# Patient Record
Sex: FEMALE | Race: WHITE | NOT HISPANIC OR LATINO | Employment: PART TIME | URBAN - METROPOLITAN AREA
[De-identification: names, ages, dates, MRNs, and addresses within clinical notes are randomized per-mention and may not be internally consistent; named-entity substitution may affect disease eponyms.]

---

## 2017-01-09 ENCOUNTER — OFFICE VISIT (OUTPATIENT)
Dept: OBSTETRICS AND GYNECOLOGY | Age: 32
End: 2017-01-09

## 2017-01-09 VITALS
HEIGHT: 66 IN | BODY MASS INDEX: 25.39 KG/M2 | WEIGHT: 158 LBS | DIASTOLIC BLOOD PRESSURE: 84 MMHG | SYSTOLIC BLOOD PRESSURE: 122 MMHG

## 2017-01-09 DIAGNOSIS — Z11.3 SCREENING FOR STD (SEXUALLY TRANSMITTED DISEASE): ICD-10-CM

## 2017-01-09 DIAGNOSIS — Z30.41 ENCOUNTER FOR SURVEILLANCE OF CONTRACEPTIVE PILLS: ICD-10-CM

## 2017-01-09 DIAGNOSIS — Z01.419 ENCOUNTER FOR GYNECOLOGICAL EXAMINATION: Primary | ICD-10-CM

## 2017-01-09 DIAGNOSIS — Z11.51 SCREENING FOR HPV (HUMAN PAPILLOMAVIRUS): ICD-10-CM

## 2017-01-09 PROCEDURE — 99395 PREV VISIT EST AGE 18-39: CPT | Performed by: OBSTETRICS & GYNECOLOGY

## 2017-01-09 RX ORDER — NORETHINDRONE ACETATE AND ETHINYL ESTRADIOL AND FERROUS FUMARATE 1MG-20(24)
1 KIT ORAL DAILY
Qty: 28 TABLET | Refills: 12 | Status: SHIPPED | OUTPATIENT
Start: 2017-01-09 | End: 2018-01-15

## 2017-01-09 NOTE — PROGRESS NOTES
"Subjective     Chief Complaint   Patient presents with   • Annual Exam     no problems       History of Present Illness    Carey Ring is a 31 y.o.  who presents for annual exam.  Her menses are regular every 28-30 days, lasting 4-7 days, dysmenorrhea none   Regular menses with ocp's, plans to come off early fall for pregnancy, engaged, getting  in May  Obstetric History:  OB History      Para Term  AB TAB SAB Ectopic Multiple Living    0 0 0 0 0 0 0 0 0 0         Menstrual History:     Patient's last menstrual period was 2017.         Current contraception: OCP (estrogen/progesterone)  History of abnormal Pap smear: yes - ascus, hpv negative  Received Gardasil immunization: yes - all 3  Perform regular self breast exam: no  Family history of uterine or ovarian cancer: no  Family History of colon cancer: yes - dad at 56yo  Family history of breast cancer: yes - mgm-postmenopausal    Mammogram: not indicated.  Colonoscopy: not indicated.  DEXA: not indicated.    Exercise: moderately active      The following portions of the patient's history were reviewed and updated as appropriate: allergies, current medications, past family history, past medical history, past social history, past surgical history and problem list.    Review of Systems    A comprehensive review of systems was negative.     Objective   Physical Exam    Visit Vitals   • /84   • Ht 66\" (167.6 cm)   • Wt 158 lb (71.7 kg)   • LMP 2017   • Breastfeeding No   • BMI 25.5 kg/m2       General:   alert, appears stated age, cooperative and no distress   Neck: no asymmetry, masses, or scars and thyroid normal to palpation   Heart: regular rate and rhythm, S1, S2 normal, no murmur, click, rub or gallop   Lungs: clear to auscultation bilaterally   Abdomen: soft, non-tender, without masses or organomegaly   Breast: inspection negative, no nipple discharge or bleeding, no masses or nodularity palpable   Vulva: normal, " Bartholin's, Urethra, Grainfield's normal   Vagina: normal mucosa, normal discharge   Cervix: no lesions   Uterus: normal size, mobile, non-tender   Adnexa: normal adnexa and no mass, fullness, tenderness   Rectal: not indicated     Assessment/Plan   Carey was seen today for annual exam.    Diagnoses and all orders for this visit:    Encounter for gynecological examination  -     IGP,CtNg,AptimaHPV,rfx16 / 18,45    Screening for HPV (human papillomavirus)  -     IGP,CtNg,AptimaHPV,rfx16 / 18,45    Screening for STD (sexually transmitted disease)  -     IGP,CtNg,AptimaHPV,rfx16 / 18,45        All questions answered.  Breast self exam technique reviewed and patient encouraged to perform self-exam monthly.  Discussed healthy lifestyle modifications.  Recommended 30 minutes of aerobic exercise five times per week.  Discussed calcium needs to prevent osteoporosis.    Pt wants to continue Minastrin currently, aware risks DVT/PE/CVE/HTN and gallbladder disease

## 2017-01-09 NOTE — MR AVS SNAPSHOT
Carey Ring   1/9/2017 3:00 PM   Office Visit    Dept Phone:  [phone]   Encounter #:  [id number]    Provider:  Rea Stone MD   Department:  [facility]                Your Full Care Plan              Today's Medication Changes          These changes are accurate as of: 1/9/17  4:19 PM.  If you have any questions, ask your nurse or doctor.               Medication(s)that have changed:     Norethin Ace-Eth Estrad-FE 1-20 MG-MCG(24) chewable tablet   Commonly known as:  MINASTRIN 24 FE   Chew 1 tablet Daily.   What changed:  See the new instructions.   Changed by:  Rea Stone MD            Where to Get Your Medications      These medications were sent to [facility]/pharmacy #[id number] - [place], [place] - [address]. AT NEAR [address] - [phone]  - [phone]   [address], [place] [address]     Phone:  [phone]     Norethin Ace-Eth Estrad-FE 1-20 MG-MCG(24) chewable tablet                  Your Updated Medication List          This list is accurate as of: 1/9/17  4:19 PM.  Always use your most recent med list.                MULTIVITAL tablet       Norethin Ace-Eth Estrad-FE 1-20 MG-MCG(24) chewable tablet   Commonly known as:  MINASTRIN 24 FE   Chew 1 tablet Daily.               We Performed the Following     IGP,CtNg,AptimaHPV,rfx16 / 18,45       You Were Diagnosed With        Codes Comments    Encounter for gynecological examination    -  Primary ICD-10-CM: Z01.419  ICD-9-CM: V72.31     Screening for HPV (human papillomavirus)     ICD-10-CM: Z11.51  ICD-9-CM: V73.81     Screening for STD (sexually transmitted disease)     ICD-10-CM: Z11.3  ICD-9-CM: V74.5     Encounter for surveillance of contraceptive pills     ICD-10-CM: Z30.41  ICD-9-CM: V25.41       Instructions     None    Patient Instructions History      Upcoming Appointments     Visit Type Date Time Department    WELLNESS 1/9/2017  3:00 PM CHERYL  "OB[facility] [place]      [employer] Signup     [facility] [employer] allows you to send messages to your doctor, view your test results, renew your prescriptions, schedule appointments, and more. To sign up, go to [employer] and click on the Sign Up Now link in the New User? box. Enter your [employer] Activation Code exactly as it appears below along with the last four digits of your Social Security Number and your Date of Birth () to complete the sign-up process. If you do not sign up before the expiration date, you must request a new code.    [employer] Activation Code: [email or web address]  Expires: 2017  4:19 PM    If you have questions, you can email [email or web address] or call [phone] to talk to our [employer] staff. Remember, [employer] is NOT to be used for urgent needs. For medical emergencies, dial 911.               Other Info from Your Visit           Your Appointments     Jaspreet 15, 2018  3:00 PM EST   Wellness with Rea Stone MD   [facility] (--)    [address] [address] [phone]              Allergies     Ceclor [Cefaclor]        Reason for Visit     Annual Exam no problems      Vital Signs     Blood Pressure Height Weight Last Menstrual Period Breastfeeding? Body Mass Index    122/84 66\" (167.6 cm) 158 lb (71.7 kg) 2017 No 25.5 kg/m2    Smoking Status                   Never Smoker           Problems and Diagnoses Noted     Encounter for gynecological examination    -  Primary    Screening for HPV (human papillomavirus)        Screening for STDs (sexually transmitted diseases)        Surveillance for birth control, oral contraceptives            "

## 2017-01-13 LAB
C TRACH RRNA CVX QL NAA+PROBE: NEGATIVE
CYTOLOGIST CVX/VAG CYTO: NORMAL
CYTOLOGY CVX/VAG DOC THIN PREP: NORMAL
DX ICD CODE: NORMAL
HIV 1 & 2 AB SER-IMP: NORMAL
HPV I/H RISK 4 DNA CVX QL PROBE+SIG AMP: NEGATIVE
N GONORRHOEA RRNA CVX QL NAA+PROBE: NEGATIVE
OTHER STN SPEC: NORMAL
PATH REPORT.FINAL DX SPEC: NORMAL
STAT OF ADQ CVX/VAG CYTO-IMP: NORMAL

## 2017-01-18 ENCOUNTER — TELEPHONE (OUTPATIENT)
Dept: OBSTETRICS AND GYNECOLOGY | Age: 32
End: 2017-01-18

## 2017-01-18 NOTE — TELEPHONE ENCOUNTER
----- Message from Rea Stone MD sent at 1/17/2017  5:36 PM EST -----  Call pt, negative pap and cultures

## 2017-03-30 ENCOUNTER — TELEPHONE (OUTPATIENT)
Dept: OBSTETRICS AND GYNECOLOGY | Age: 32
End: 2017-03-30

## 2017-03-30 NOTE — TELEPHONE ENCOUNTER
Pt states she is swapping to a new [facility], will have them pull script from old [facility]. Will call if any probs for a new script.

## 2017-03-30 NOTE — TELEPHONE ENCOUNTER
Dr Batista pt on a new ins that does not cover minastrin and the pt will only be on bcp 2 more months til she gets , wants to know if we have 2 mos sample of something similar that we can give to her?

## 2017-12-27 ENCOUNTER — TELEPHONE (OUTPATIENT)
Dept: OBSTETRICS AND GYNECOLOGY | Age: 32
End: 2017-12-27

## 2017-12-27 DIAGNOSIS — N92.6 MISSED MENSES: Primary | ICD-10-CM

## 2017-12-28 DIAGNOSIS — N92.6 IRREGULAR MENSES: Primary | ICD-10-CM

## 2017-12-28 LAB — HCG INTACT+B SERPL-ACNC: <0.5 MIU/ML

## 2017-12-29 ENCOUNTER — TELEPHONE (OUTPATIENT)
Dept: OBSTETRICS AND GYNECOLOGY | Age: 32
End: 2017-12-29

## 2018-01-15 ENCOUNTER — OFFICE VISIT (OUTPATIENT)
Dept: OBSTETRICS AND GYNECOLOGY | Age: 33
End: 2018-01-15

## 2018-01-15 VITALS
SYSTOLIC BLOOD PRESSURE: 120 MMHG | BODY MASS INDEX: 27.32 KG/M2 | WEIGHT: 170 LBS | DIASTOLIC BLOOD PRESSURE: 80 MMHG | HEIGHT: 66 IN

## 2018-01-15 DIAGNOSIS — Z31.69 ENCOUNTER FOR PRECONCEPTION CONSULTATION: ICD-10-CM

## 2018-01-15 DIAGNOSIS — Z01.419 VISIT FOR GYNECOLOGIC EXAMINATION: Primary | ICD-10-CM

## 2018-01-15 PROCEDURE — 99395 PREV VISIT EST AGE 18-39: CPT | Performed by: OBSTETRICS & GYNECOLOGY

## 2018-01-15 NOTE — PROGRESS NOTES
Subjective     Chief Complaint   Patient presents with   • Annual Exam     no problems       History of Present Illness    Carey Armando is a 32 y.o.  who presents for annual exam.  Menses about Q month but was late in December then started. She did call and take serum pregnancy test and it was negative. Urine tests were negative as well. She stopped ocp's last May but did not start trying for pregnancy until about 6 months ago. She is on PNV and has had chicken pox. Pt has taken ovulation kits and they are positive.   Obstetric History:  OB History      Para Term  AB Living    0 0 0 0 0 0    SAB TAB Ectopic Multiple Live Births    0 0 0 0          Menstrual History:     Patient's last menstrual period was 2017.         Current contraception: none  History of abnormal Pap smear: yes - ascus with negative hpv, f/u negative  Received Gardasil immunization: yes - all 3  Perform regular self breast exam: no  Family history of uterine or ovarian cancer: no  Family History of colon cancer: yes - dad at 55 yo  Family history of breast cancer: yes - mgm after menopause    Mammogram: not indicated.  Colonoscopy: not indicated.--pt advised to start at 43 yo by her father's physician  DEXA: not indicated.    Exercise: moderately active  Calcium/Vitamin D: adequate intake    The following portions of the patient's history were reviewed and updated as appropriate: allergies, current medications, past family history, past medical history, past social history, past surgical history and problem list.    Review of Systems    Review of Systems   Constitutional: Negative for fatigue.   Respiratory: Negative for shortness of breath.    Gastrointestinal: Negative for abdominal pain.   Genitourinary: Negative for dysuria. negative for abnormal bleeding, had 1 late menses but cycles overall regular  Neurological: Negative for headaches.   Psychiatric/Behavioral: Negative for dysphoric mood.         Objective  "  Physical Exam    /80  Ht 167.6 cm (66\")  Wt 77.1 kg (170 lb)  LMP 12/29/2017  BMI 27.44 kg/m2    General:   alert, appears stated age, cooperative and no distress   Neck: no asymmetry, masses, or scars and thyroid normal to palpation   Heart: regular rate and rhythm, S1, S2 normal, no murmur, click, rub or gallop   Lungs: clear to auscultation bilaterally   Abdomen: soft, non-tender, without masses or organomegaly   Breast: inspection negative, no nipple discharge or bleeding, no masses or nodularity palpable and no axillary adenopathy   Vulva: normal, Bartholin's, Urethra, Dubberly's normal   Vagina: normal mucosa, normal discharge   Cervix: no lesions   Uterus: normal size, mobile, non-tender, normal shape and consistency   Adnexa: normal adnexa and no mass, fullness, tenderness   Rectal: not indicated     Assessment/Plan   Carey was seen today for annual exam.    Diagnoses and all orders for this visit:    Visit for gynecologic examination    Encounter for preconception consultation      All questions answered.  Breast self exam technique reviewed and patient encouraged to perform self-exam monthly.  Discussed healthy lifestyle modifications.  Recommended 30 minutes of aerobic exercise five times per week.    Pap deferred, pt had negative pap and hpv last year    Pt will continue to try for pregnancy, she is on pnv, she will call if positive test, she had flu shot this year    Pt's spouse is seeing urologist for possible testicular lesion. Tests have been normal so far, discussed, he may proceed with SA there to check                     "

## 2018-02-08 ENCOUNTER — TELEPHONE (OUTPATIENT)
Dept: OBSTETRICS AND GYNECOLOGY | Age: 33
End: 2018-02-08

## 2018-02-09 ENCOUNTER — TELEPHONE (OUTPATIENT)
Dept: OBSTETRICS AND GYNECOLOGY | Age: 33
End: 2018-02-09

## 2018-02-09 NOTE — TELEPHONE ENCOUNTER
Called pt regarding request. She notes that her spouse had an abnormal spermanalysis and was referred to a male fertility specialist by his urologist. Advised pt to hold on ovulation drugs at this time until further information gathered from urologist. Pt anxious to get started and interested in seeing fertility MD. Reviewed recommended practices.

## 2018-10-01 ENCOUNTER — PROCEDURE VISIT (OUTPATIENT)
Dept: OBSTETRICS AND GYNECOLOGY | Age: 33
End: 2018-10-01

## 2018-10-01 ENCOUNTER — INITIAL PRENATAL (OUTPATIENT)
Dept: OBSTETRICS AND GYNECOLOGY | Age: 33
End: 2018-10-01

## 2018-10-01 ENCOUNTER — OFFICE VISIT (OUTPATIENT)
Dept: OBSTETRICS AND GYNECOLOGY | Age: 33
End: 2018-10-01

## 2018-10-01 VITALS
SYSTOLIC BLOOD PRESSURE: 118 MMHG | HEIGHT: 66 IN | WEIGHT: 171.4 LBS | DIASTOLIC BLOOD PRESSURE: 80 MMHG | BODY MASS INDEX: 27.55 KG/M2

## 2018-10-01 VITALS — DIASTOLIC BLOOD PRESSURE: 80 MMHG | SYSTOLIC BLOOD PRESSURE: 118 MMHG | BODY MASS INDEX: 27.6 KG/M2 | WEIGHT: 171 LBS

## 2018-10-01 DIAGNOSIS — Z67.91 RH NEGATIVE STATE IN ANTEPARTUM PERIOD: ICD-10-CM

## 2018-10-01 DIAGNOSIS — O36.71X0: Primary | ICD-10-CM

## 2018-10-01 DIAGNOSIS — Z3A.12 12 WEEKS GESTATION OF PREGNANCY: ICD-10-CM

## 2018-10-01 DIAGNOSIS — O09.819 PREGNANCY RESULTING FROM IN VITRO FERTILIZATION, ANTEPARTUM: ICD-10-CM

## 2018-10-01 DIAGNOSIS — Z3A.12 12 WEEKS GESTATION OF PREGNANCY: Primary | ICD-10-CM

## 2018-10-01 DIAGNOSIS — Z13.89 SCREENING FOR BLOOD OR PROTEIN IN URINE: ICD-10-CM

## 2018-10-01 DIAGNOSIS — O26.899 RH NEGATIVE STATE IN ANTEPARTUM PERIOD: ICD-10-CM

## 2018-10-01 PROBLEM — D21.9 FIBROID: Status: ACTIVE | Noted: 2018-10-01

## 2018-10-01 LAB
EXTERNAL NIPT: NORMAL
VZV IGG SER QL: NORMAL

## 2018-10-01 PROCEDURE — 0501F PRENATAL FLOW SHEET: CPT | Performed by: OBSTETRICS & GYNECOLOGY

## 2018-10-01 PROCEDURE — 76801 OB US < 14 WKS SINGLE FETUS: CPT | Performed by: OBSTETRICS & GYNECOLOGY

## 2018-10-01 RX ORDER — ASPIRIN 81 MG/1
81 TABLET ORAL DAILY
COMMUNITY
End: 2019-07-11

## 2018-10-01 RX ORDER — PRENATAL VIT NO.126/IRON/FOLIC 28MG-0.8MG
TABLET ORAL DAILY
COMMUNITY
End: 2021-04-22 | Stop reason: HOSPADM

## 2018-10-01 NOTE — PROGRESS NOTES
"Chief complaint: early pregnancy    HPI  Carey Armando is a 33 y.o. female who presents to establish OB care from claudia TIJERINA.         The following portions of the patient's history were reviewed and updated as appropriate: allergies, current medications, past family history, past medical history, past social history, past surgical history and problem list.    Review of Systems  see prenatal form    /80   Ht 167.6 cm (66\")   Wt 77.7 kg (171 lb 6.4 oz)   LMP 07/11/2018   BMI 27.66 kg/m²         Physical Exam      see prenatal form for physical exam    Carey was seen today for initial prenatal visit.    Diagnoses and all orders for this visit:    12 weeks gestation of pregnancy  -     TSH  -     OB Panel With HIV  -     Urine Culture - Urine, Urine, Clean Catch    Pregnancy resulting from in vitro fertilization, antepartum    Rh negative state in antepartum period      Pt presents for OB care, see prenatal form for initial visit information          "

## 2018-10-01 NOTE — PROGRESS NOTES
Pt comes to establish OB care. She saw fertility and had IVF. She had some bleeding just after implantation but no recent bleeding or significant n/v.   Reviewed prenatal guidelines and zika warnings  Pt had carrier testing through fertility MD and declines screen here, she would like to proceed with aneuploidy testing.  She notes she and spouse had some positive tests with [facility] but they were not both positive for any of the same issues, she declines further genetic counseling. She works at [employer] and spouse is a .     U/s done following appt, 4 cm posterior uterine fibroid noted and discussed with pt, will follow. Plan rtc 4 weeks and u/s for anatomy in 7 weeks    Discussed Rh negative and rhogam indications    Pt is on baby asa, recommend she consider staying on throughout pregnancy as she has mildly increased risk for preeclampsia

## 2018-10-02 LAB
ABO GROUP BLD: (no result)
BASOPHILS # BLD AUTO: 0 X10E3/UL (ref 0–0.2)
BASOPHILS NFR BLD AUTO: 0 %
BLD GP AB SCN SERPL QL: NEGATIVE
EOSINOPHIL # BLD AUTO: 0.3 X10E3/UL (ref 0–0.4)
EOSINOPHIL NFR BLD AUTO: 2 %
ERYTHROCYTE [DISTWIDTH] IN BLOOD BY AUTOMATED COUNT: 13.4 % (ref 12.3–15.4)
HBV SURFACE AG SERPL QL IA: NEGATIVE
HCT VFR BLD AUTO: 39 % (ref 34–46.6)
HCV AB S/CO SERPL IA: <0.1 S/CO RATIO (ref 0–0.9)
HGB BLD-MCNC: 13.4 G/DL (ref 11.1–15.9)
HIV 1+2 AB+HIV1 P24 AG SERPL QL IA: NON REACTIVE
IMM GRANULOCYTES # BLD: 0 X10E3/UL (ref 0–0.1)
IMM GRANULOCYTES NFR BLD: 0 %
LYMPHOCYTES # BLD AUTO: 3.1 X10E3/UL (ref 0.7–3.1)
LYMPHOCYTES NFR BLD AUTO: 26 %
MCH RBC QN AUTO: 30.2 PG (ref 26.6–33)
MCHC RBC AUTO-ENTMCNC: 34.4 G/DL (ref 31.5–35.7)
MCV RBC AUTO: 88 FL (ref 79–97)
MONOCYTES # BLD AUTO: 0.8 X10E3/UL (ref 0.1–0.9)
MONOCYTES NFR BLD AUTO: 7 %
NEUTROPHILS # BLD AUTO: 7.7 X10E3/UL (ref 1.4–7)
NEUTROPHILS NFR BLD AUTO: 65 %
PLATELET # BLD AUTO: 252 X10E3/UL (ref 150–379)
RBC # BLD AUTO: 4.44 X10E6/UL (ref 3.77–5.28)
RH BLD: NEGATIVE
RPR SER QL: NON REACTIVE
RUBV IGG SERPL IA-ACNC: 8.05 INDEX
TSH SERPL DL<=0.005 MIU/L-ACNC: 1.07 MIU/ML (ref 0.27–4.2)
WBC # BLD AUTO: 11.9 X10E3/UL (ref 3.4–10.8)

## 2018-10-03 ENCOUNTER — TELEPHONE (OUTPATIENT)
Dept: OBSTETRICS AND GYNECOLOGY | Age: 33
End: 2018-10-03

## 2018-10-03 LAB
BACTERIA UR CULT: NORMAL
BACTERIA UR CULT: NORMAL

## 2018-10-03 NOTE — TELEPHONE ENCOUNTER
----- Message from Rea Stone MD sent at 10/3/2018  8:29 AM EDT -----  Please call pt, prenatal labs are essentially normal, WBC mildly elevated but this is common in pregnancy; she is Rh negative which pt already knew

## 2018-10-12 ENCOUNTER — TELEPHONE (OUTPATIENT)
Dept: OBSTETRICS AND GYNECOLOGY | Age: 33
End: 2018-10-12

## 2018-10-12 NOTE — TELEPHONE ENCOUNTER
----- Message from Rea Stone MD sent at 10/11/2018  3:23 PM EDT -----  Please call pt, her aneuploidy screen is negative/normal. If she wants to know gender, it is male

## 2018-10-26 ENCOUNTER — TELEPHONE (OUTPATIENT)
Dept: OBSTETRICS AND GYNECOLOGY | Age: 33
End: 2018-10-26

## 2018-10-26 NOTE — TELEPHONE ENCOUNTER
Pt.thinks she was exposed to Hep A.The patient had blood work done and it will not be back until Monday. She is 16 wks pregnant.What are your recommendations?

## 2018-10-26 NOTE — TELEPHONE ENCOUNTER
Pt has a pt that may have been exposed to Hep A but the testing is not back yet. She was working with a patient and used precautions but is concerned. Recommended she proceed with Hep A vaccination as she did not get vaccinated. Also advised pt to seek urgent care if she becomes ill.

## 2018-10-30 ENCOUNTER — PROCEDURE VISIT (OUTPATIENT)
Dept: OBSTETRICS AND GYNECOLOGY | Age: 33
End: 2018-10-30

## 2018-10-30 ENCOUNTER — ROUTINE PRENATAL (OUTPATIENT)
Dept: OBSTETRICS AND GYNECOLOGY | Age: 33
End: 2018-10-30

## 2018-10-30 VITALS — SYSTOLIC BLOOD PRESSURE: 118 MMHG | DIASTOLIC BLOOD PRESSURE: 80 MMHG | BODY MASS INDEX: 27.73 KG/M2 | WEIGHT: 171.8 LBS

## 2018-10-30 DIAGNOSIS — O09.819 PREGNANCY RESULTING FROM IN VITRO FERTILIZATION, ANTEPARTUM: ICD-10-CM

## 2018-10-30 DIAGNOSIS — Z3A.16 16 WEEKS GESTATION OF PREGNANCY: Primary | ICD-10-CM

## 2018-10-30 DIAGNOSIS — D21.9 FIBROID: Primary | ICD-10-CM

## 2018-10-30 DIAGNOSIS — Z36.1 NEED FOR MATERNAL SERUM ALPHA-PROTEIN (MSAFP) SCREENING: ICD-10-CM

## 2018-10-30 DIAGNOSIS — Z67.91 RH NEGATIVE STATE IN ANTEPARTUM PERIOD: ICD-10-CM

## 2018-10-30 DIAGNOSIS — O26.899 RH NEGATIVE STATE IN ANTEPARTUM PERIOD: ICD-10-CM

## 2018-10-30 DIAGNOSIS — D21.9 FIBROID: ICD-10-CM

## 2018-10-30 PROBLEM — O43.199 BILOBED PLACENTA: Status: ACTIVE | Noted: 2018-10-30

## 2018-10-30 PROCEDURE — 0502F SUBSEQUENT PRENATAL CARE: CPT | Performed by: OBSTETRICS & GYNECOLOGY

## 2018-10-30 PROCEDURE — 76817 TRANSVAGINAL US OBSTETRIC: CPT | Performed by: OBSTETRICS & GYNECOLOGY

## 2018-10-30 NOTE — PROGRESS NOTES
Addendum:   U/s shows stable post fibroid at 4.4 cm, cervical length normal at 4.7 cm, possible bilobed placenta, will follow

## 2018-10-30 NOTE — PROGRESS NOTES
Occ sharp pain in groin but no regular cramping or vag bleeding  She had a pt at work with hep A, she used precautions and has not had symptoms. She proceeded with hep A vaccine and will report any issues  She has had some increased constipation and is concerned this is related to fibroid. Discussed management  Check afp today  Plan u/s with cervical length and repeat assessment of fibroid today, if stable, f/u for u/s 4 weeks

## 2018-11-01 LAB
AFP ADJ MOM SERPL: 1.12
AFP INTERP SERPL-IMP: NORMAL
AFP INTERP SERPL-IMP: NORMAL
AFP SERPL-MCNC: 36.6 NG/ML
AGE AT DELIVERY: 32 YR
GA METHOD: NORMAL
GA: 16.6 WEEKS
IDDM PATIENT QL: NO
LABORATORY COMMENT REPORT: NORMAL
MULTIPLE PREGNANCY: NO
NEURAL TUBE DEFECT RISK FETUS: 8371 %
RESULT: NORMAL

## 2018-11-02 ENCOUNTER — TELEPHONE (OUTPATIENT)
Dept: OBSTETRICS AND GYNECOLOGY | Age: 33
End: 2018-11-02

## 2018-11-02 NOTE — TELEPHONE ENCOUNTER
----- Message from Rea Stone MD sent at 11/1/2018  9:23 AM EDT -----  Please call Carey, her AFP is negative/normal

## 2018-11-14 ENCOUNTER — TELEPHONE (OUTPATIENT)
Dept: OBSTETRICS AND GYNECOLOGY | Age: 33
End: 2018-11-14

## 2018-11-14 ENCOUNTER — APPOINTMENT (OUTPATIENT)
Dept: ULTRASOUND IMAGING | Facility: HOSPITAL | Age: 33
End: 2018-11-14

## 2018-11-14 ENCOUNTER — HOSPITAL ENCOUNTER (OUTPATIENT)
Facility: HOSPITAL | Age: 33
Setting detail: OBSERVATION
Discharge: HOME | End: 2018-11-14
Attending: OBSTETRICS & GYNECOLOGY | Admitting: OBSTETRICS & GYNECOLOGY

## 2018-11-14 VITALS
BODY MASS INDEX: 27.78 KG/M2 | HEIGHT: 67 IN | SYSTOLIC BLOOD PRESSURE: 115 MMHG | DIASTOLIC BLOOD PRESSURE: 74 MMHG | RESPIRATION RATE: 18 BRPM | HEART RATE: 76 BPM | WEIGHT: 177 LBS | TEMPERATURE: 98.5 F

## 2018-11-14 PROBLEM — Z34.90 PREGNANCY: Status: ACTIVE | Noted: 2018-11-14
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
## 2018-11-14 LAB
ABO GROUP BLD: NORMAL
BLD GP AB SCN SERPL QL: NEGATIVE
FETAL RBC/RBC BLD FC-RTO: 0 %
HGB F BLD QL KLEIH BETKE: NORMAL
RH BLD: NEGATIVE
T&S EXPIRATION DATE: NORMAL

## 2018-11-14 PROCEDURE — 85460 HEMOGLOBIN FETAL: CPT | Performed by: OBSTETRICS & GYNECOLOGY

## 2018-11-14 PROCEDURE — 86850 RBC ANTIBODY SCREEN: CPT | Performed by: OBSTETRICS & GYNECOLOGY

## 2018-11-14 PROCEDURE — 96372 THER/PROPH/DIAG INJ SC/IM: CPT

## 2018-11-14 PROCEDURE — 0502F SUBSEQUENT PRENATAL CARE: CPT | Performed by: OBSTETRICS & GYNECOLOGY

## 2018-11-14 PROCEDURE — G0378 HOSPITAL OBSERVATION PER HR: HCPCS

## 2018-11-14 PROCEDURE — 76815 OB US LIMITED FETUS(S): CPT

## 2018-11-14 PROCEDURE — 76805 OB US >/= 14 WKS SNGL FETUS: CPT

## 2018-11-14 PROCEDURE — 25010000002 RHO D IMMUNE GLOBULIN 1500 UNIT/2ML SOLUTION PREFILLED SYRINGE: Performed by: OBSTETRICS & GYNECOLOGY

## 2018-11-14 PROCEDURE — 86901 BLOOD TYPING SEROLOGIC RH(D): CPT | Performed by: OBSTETRICS & GYNECOLOGY

## 2018-11-14 PROCEDURE — 76817 TRANSVAGINAL US OBSTETRIC: CPT

## 2018-11-14 PROCEDURE — 86900 BLOOD TYPING SEROLOGIC ABO: CPT | Performed by: OBSTETRICS & GYNECOLOGY

## 2018-11-14 RX ORDER — DOCUSATE SODIUM 100 MG/1
100 CAPSULE, LIQUID FILLED ORAL 2 TIMES DAILY
COMMUNITY
End: 2021-04-22 | Stop reason: HOSPADM

## 2018-11-14 RX ADMIN — HUMAN RHO(D) IMMUNE GLOBULIN 1500 UNITS: 1500 SOLUTION INTRAMUSCULAR; INTRAVENOUS at 18:43

## 2018-11-14 NOTE — PROGRESS NOTES
[facility]  Obstetric History and Physical    Chief complaint: fall in pregnancy    Subjective     Patient is a 33 y.o. female  currently at 18w5d, who presents with recent fall on her side. She tripped over some cords on the floor at work. She reports she caught herself with her arms but she did hit her left side. She had some pain on her left side following. Now it is improved but she reports mild discomfort on that side. She denies any bleeding or leaking fluid. She notes positive fetal movement.    Her prenatal care is notable for .  Patient Active Problem List   Diagnosis   • Pregnancy resulting from in vitro fertilization, antepartum   • Rh negative state in antepartum period   • Fibroid   • Bilobed placenta   • Pregnancy   • Fall   .  Her previous obstetric/gynecological history is noted for not contributory.    The following portions of the patients history were reviewed and updated as appropriate: current medications, allergies, past medical history, past surgical history, past family history, past social history and problem list .       Prenatal Information:  Prenatal Results     Initial Prenatal Labs     Test Value Reference Range Date Time    Hemoglobin 13.4 g/dL 11.1 - 15.9 g/dL 10/01/18 1631    Hematocrit 39.0 % 34.0 - 46.6 % 10/01/18 1631    Platelets 252 x10E3/uL 150 - 379 x10E3/uL 10/01/18 1631    Rubella IgG 8.05 index Immune >0.99 index 10/01/18 1631    Hepatitis B SAg Negative  Negative 10/01/18 1631    Hepatitis C Ab <0.1 s/co ratio 0.0 - 0.9 s/co ratio 10/01/18 1631    RPR Non Reactive  Non Reactive 10/01/18 1631    ABO O   10/01/18 1631    Rh Negative   10/01/18 1631    Antibody Screen Negative  Negative 10/01/18 1631    HIV Non Reactive  Non Reactive 10/01/18 1631    Urine Culture Final report   10/01/18 1637    Gonorrhea        Chlamydia        TSH 1.070 mIU/mL 0.270 - 4.200 mIU/mL 10/01/18 1631          2nd and 3rd Trimester     Test Value Reference Range Date Time    Hemoglobin  (repeated)        Hematocrit (repeated)        GCT        Antibody Screen (repeated)        GTT Fasting        GTT 1 Hr        GTT 2 Hr        GTT 3 Hr        Group B Strep              Drug Screening     Test Value Reference Range Date Time    Amphetamine Screen        Barbiturate Screen        Benzodiazepine Screen        Methadone Screen        Phencyclidine Screen        Opiates Screen        THC Screen        Cocaine Screen        Propoxyphene Screen        Buprenorphine Screen        Methamphetamine Screen        Oxycodone Screen        Tryicyclic Antidepressants Screen              Other (Risk screening)     Test Value Reference Range Date Time    Varicella IgG + history   10/01/18     Parvovirus IgG        CMV IgG        Cystic Fibrosis        Hemoglobin electrophoresis        NIPT normal   10/01/18     MSAFP-4        AFP (for NTD only) *Screen Negative*   10/30/18 1408              External Prenatal Results     Pregnancy Outside Results - Transcribed From Office Records - See Scanned Records For Details     Test Value Date Time    Hgb 13.4 g/dL 10/01/18 1631    Hct 39.0 % 10/01/18 1631    ABO O  10/01/18 1631    Rh Negative  10/01/18 1631    Antibody Screen Negative  10/01/18 1631    Glucose Fasting GTT       Glucose Tolerance Test 1 hour       Glucose Tolerance Test 3 hour       Gonorrhea (discrete)       Chlamydia (discrete)       RPR Non Reactive  10/01/18 1631    VDRL       Syphilis Antibody       Rubella 8.05 index 10/01/18 1631    HBsAg Negative  10/01/18 1631    Herpes Simplex Virus PCR       Herpes Simplex VIrus Culture       HIV Non Reactive  10/01/18 1631    Hep C RNA Quant PCR       Hep C Antibody <0.1 s/co ratio 10/01/18 1631    AFP 36.6 ng/mL 10/30/18 1408    Group B Strep       GBS Susceptibility to Clindamycin       GBS Susceptibility to Erythromycin       Fetal Fibronectin       Genetic Testing, Maternal Blood             Drug Screening     Test Value Date Time    Urine Drug Screen        Amphetamine Screen       Barbiturate Screen       Benzodiazepine Screen       Methadone Screen       Phencyclidine Screen       Opiates Screen       THC Screen       Cocaine Screen       Propoxyphene Screen       Buprenorphine Screen       Methamphetamine Screen       Oxycodone Screen       Tricyclic Antidepressants Screen                    Past OB History:     Obstetric History       T0      L0     SAB0   TAB0   Ectopic0   Molar0   Multiple0   Live Births0       # Outcome Date GA Lbr Ranjit/2nd Weight Sex Delivery Anes PTL Lv   1 Current                   Past Medical History: Past Medical History:   Diagnosis Date   •  product of in vitro fertilization (IVF) pregnancy 2018      Past Surgical History Past Surgical History:   Procedure Laterality Date   • LASIK        Family History: Family History   Problem Relation Age of Onset   • Colon cancer Father    • Breast cancer Maternal Grandmother    • Prostate cancer Paternal Uncle    • Deep vein thrombosis Neg Hx    • Pulmonary embolism Neg Hx       Social History:  reports that  has never smoked. she has never used smokeless tobacco.   reports that she drinks alcohol.   reports that she does not use drugs.        General ROS: .Review of Systems - Hematological and Lymphatic ROS: negative  Respiratory ROS: negative  Cardiovascular ROS: negative  Gastrointestinal ROS: negative  Genito-Urinary ROS: negative for leaking fluid or bleeding  Musculoskeletal ROS: positive for left side pain after fall  Neurological ROS: negative    Objective       Vital Signs Range for the last 24 hours  Temperature: Temp:  [98.5 °F (36.9 °C)] 98.5 °F (36.9 °C)   Temp Source: Temp src: Oral   BP: BP: (115)/(74) 115/74   Pulse: Heart Rate:  [76] 76   Respirations: Resp:  [18] 18   SPO2:     O2 Amount (l/min):     O2 Devices     Weight:       Physical Examination: General appearance - alert, well appearing, and in no distress  Mental status - alert, oriented to person,  place, and time  Chest - normal respiratory effort  Heart - normal rate and regular rhythm  Abdomen - gravid, nontender, no guarding or rebound, fundus soft, no bruising or sign of injury noted  Neurological - alert, oriented, normal speech, no focal findings or movement disorder noted  Extremities - bilateral lower ext without edema or cords, no tenderness  Skin - normal coloration and turgor                            Fetal Heart Rate Assessment   Method: Fetal HR Assessment Method: intermittent auscultation, using Doppler   Beats/min: Fetal HR (beats/min): 145   Baseline: Fetal Heart Baseline Rate: normal range   Variability:     Accels:     Decels:     Tracing Category:             Assessment/Plan       Rh negative state in antepartum period    Pregnancy    Fall        Assessment:  1.  Intrauterine pregnancy at 18w5d gestation with appropriate fetal heart tones   2.  Pt s/p fall today. She partially caught herself but notes left side hit the floor. She has some discomfort on that side but notes it is improving. She denies bleeding. She is Rh negative      Plan:  1. OB u/s, KB stain,rhogam. If pt remains stable without bleeding or significant findings on u/s, plan discharge home with office f/u within week  2. Plan of care has been reviewed with patient and family  3.  Risks, benefits of treatment plan have been discussed.  4.  All questions have been answered.        Rea Stone MD  11/14/2018  4:37 PM

## 2018-11-19 ENCOUNTER — TELEPHONE (OUTPATIENT)
Dept: OBSTETRICS AND GYNECOLOGY | Age: 33
End: 2018-11-19

## 2018-11-19 NOTE — TELEPHONE ENCOUNTER
Called pt and reviewed official u/s results. She is doing well. She requests note from work that she was advised to take off on 11/15/18. Letter printed for pt to  tomorrow.

## 2018-11-30 ENCOUNTER — PROCEDURE VISIT (OUTPATIENT)
Dept: OBSTETRICS AND GYNECOLOGY | Age: 33
End: 2018-11-30

## 2018-11-30 ENCOUNTER — ROUTINE PRENATAL (OUTPATIENT)
Dept: OBSTETRICS AND GYNECOLOGY | Age: 33
End: 2018-11-30

## 2018-11-30 VITALS — WEIGHT: 174.6 LBS | BODY MASS INDEX: 27.35 KG/M2 | DIASTOLIC BLOOD PRESSURE: 68 MMHG | SYSTOLIC BLOOD PRESSURE: 110 MMHG

## 2018-11-30 DIAGNOSIS — D21.9 FIBROID: ICD-10-CM

## 2018-11-30 DIAGNOSIS — Z3A.21 21 WEEKS GESTATION OF PREGNANCY: Primary | ICD-10-CM

## 2018-11-30 DIAGNOSIS — O43.199 BILOBED PLACENTA: ICD-10-CM

## 2018-11-30 DIAGNOSIS — Z34.92 SECOND TRIMESTER FETUS: Primary | ICD-10-CM

## 2018-11-30 DIAGNOSIS — Z67.91 RH NEGATIVE STATE IN ANTEPARTUM PERIOD: ICD-10-CM

## 2018-11-30 DIAGNOSIS — O26.899 RH NEGATIVE STATE IN ANTEPARTUM PERIOD: ICD-10-CM

## 2018-11-30 PROBLEM — N28.89 DILATED RENAL PELVIS: Status: ACTIVE | Noted: 2018-11-30

## 2018-11-30 PROCEDURE — 76805 OB US >/= 14 WKS SNGL FETUS: CPT | Performed by: OBSTETRICS & GYNECOLOGY

## 2018-11-30 PROCEDURE — 0502F SUBSEQUENT PRENATAL CARE: CPT | Performed by: OBSTETRICS & GYNECOLOGY

## 2018-11-30 NOTE — PROGRESS NOTES
No issues today, no pain or bleeding after fall  U/s reviewed: normal anatomy except with ongoing slight prominence of left renal pelvis at 4mm  Normal cervical length with abd imaging  Fibroid noted and stable, appears bilobed  Plan f/u 4 weeks with one hour, pt traveling and reviewed DVT precautions

## 2018-12-21 ENCOUNTER — ROUTINE PRENATAL (OUTPATIENT)
Dept: OBSTETRICS AND GYNECOLOGY | Age: 33
End: 2018-12-21

## 2018-12-21 VITALS — DIASTOLIC BLOOD PRESSURE: 66 MMHG | SYSTOLIC BLOOD PRESSURE: 110 MMHG | WEIGHT: 179.2 LBS | BODY MASS INDEX: 28.07 KG/M2

## 2018-12-21 DIAGNOSIS — Z13.1 SCREENING FOR DIABETES MELLITUS: ICD-10-CM

## 2018-12-21 DIAGNOSIS — O26.899 RH NEGATIVE STATE IN ANTEPARTUM PERIOD: ICD-10-CM

## 2018-12-21 DIAGNOSIS — Z3A.24 24 WEEKS GESTATION OF PREGNANCY: Primary | ICD-10-CM

## 2018-12-21 DIAGNOSIS — O43.199 BILOBED PLACENTA: ICD-10-CM

## 2018-12-21 DIAGNOSIS — R35.0 URINARY FREQUENCY: ICD-10-CM

## 2018-12-21 DIAGNOSIS — Z13.0 SCREENING FOR IRON DEFICIENCY ANEMIA: ICD-10-CM

## 2018-12-21 DIAGNOSIS — Z67.91 RH NEGATIVE STATE IN ANTEPARTUM PERIOD: ICD-10-CM

## 2018-12-21 LAB
BASOPHILS # BLD AUTO: 0.03 10*3/MM3 (ref 0–0.2)
BASOPHILS NFR BLD AUTO: 0.2 % (ref 0–1.5)
BILIRUB BLD-MCNC: NEGATIVE MG/DL
CLARITY, POC: CLEAR
COLOR UR: YELLOW
EOSINOPHIL # BLD AUTO: 0.12 10*3/MM3 (ref 0–0.7)
EOSINOPHIL NFR BLD AUTO: 0.9 % (ref 0.3–6.2)
ERYTHROCYTE [DISTWIDTH] IN BLOOD BY AUTOMATED COUNT: 13.8 % (ref 11.7–13)
GLUCOSE 1H P 50 G GLC PO SERPL-MCNC: 129 MG/DL (ref 65–139)
GLUCOSE UR STRIP-MCNC: NEGATIVE MG/DL
HCT VFR BLD AUTO: 39.5 % (ref 35.6–45.5)
HGB BLD-MCNC: 13 G/DL (ref 11.9–15.5)
IMM GRANULOCYTES # BLD: 0.07 10*3/MM3 (ref 0–0.03)
IMM GRANULOCYTES NFR BLD: 0.5 % (ref 0–0.5)
KETONES UR QL: ABNORMAL
LEUKOCYTE EST, POC: NEGATIVE
LYMPHOCYTES # BLD AUTO: 2.39 10*3/MM3 (ref 0.9–4.8)
LYMPHOCYTES NFR BLD AUTO: 18.6 % (ref 19.6–45.3)
MCH RBC QN AUTO: 30.2 PG (ref 26.9–32)
MCHC RBC AUTO-ENTMCNC: 32.9 G/DL (ref 32.4–36.3)
MCV RBC AUTO: 91.9 FL (ref 80.5–98.2)
MONOCYTES # BLD AUTO: 0.91 10*3/MM3 (ref 0.2–1.2)
MONOCYTES NFR BLD AUTO: 7.1 % (ref 5–12)
NEUTROPHILS # BLD AUTO: 9.36 10*3/MM3 (ref 1.9–8.1)
NEUTROPHILS NFR BLD AUTO: 72.7 % (ref 42.7–76)
NITRITE UR-MCNC: NEGATIVE MG/ML
PH UR: 5.5 [PH] (ref 5–8)
PLATELET # BLD AUTO: 255 10*3/MM3 (ref 140–500)
PROT UR STRIP-MCNC: NEGATIVE MG/DL
RBC # BLD AUTO: 4.3 10*6/MM3 (ref 3.9–5.2)
RBC # UR STRIP: NEGATIVE /UL
SP GR UR: 1.02 (ref 1–1.03)
UROBILINOGEN UR QL: NORMAL
WBC # BLD AUTO: 12.88 10*3/MM3 (ref 4.5–10.7)

## 2018-12-21 PROCEDURE — 81002 URINALYSIS NONAUTO W/O SCOPE: CPT | Performed by: OBSTETRICS & GYNECOLOGY

## 2018-12-21 PROCEDURE — 0502F SUBSEQUENT PRENATAL CARE: CPT | Performed by: OBSTETRICS & GYNECOLOGY

## 2018-12-21 NOTE — PROGRESS NOTES
Pt here for f/u; she denies reg ctx, bleeding or leaking fluid; she has some carpal tunnel symptoms; she has urinary frequency but no dysuria, no hematuria or other uti symptoms    O: pt in no distress    A/p: 24 weeks: one hour today with cbc; reviewed signs of PTL    Hx fibroid and mild renal prominence of fetus: plan growth and anatomy at f/u visit 1/15    Rh neg: pt had rhogam at 18 weeks after fall, plan repeat ab screen/rhogam at 28 weeks    Carpal tunnel: discussed management    Urinary frequency: will check UA and urine culture today, reviewed pyelo warnings

## 2018-12-23 LAB
BACTERIA UR CULT: NORMAL
BACTERIA UR CULT: NORMAL

## 2018-12-24 ENCOUNTER — TELEPHONE (OUTPATIENT)
Dept: OBSTETRICS AND GYNECOLOGY | Age: 33
End: 2018-12-24

## 2018-12-24 NOTE — TELEPHONE ENCOUNTER
----- Message from Rea Stone MD sent at 12/24/2018  8:03 AM EST -----  Call pt, her urine culture is essentially negative

## 2019-01-15 ENCOUNTER — ROUTINE PRENATAL (OUTPATIENT)
Dept: OBSTETRICS AND GYNECOLOGY | Age: 34
End: 2019-01-15

## 2019-01-15 ENCOUNTER — PROCEDURE VISIT (OUTPATIENT)
Dept: OBSTETRICS AND GYNECOLOGY | Age: 34
End: 2019-01-15

## 2019-01-15 VITALS — WEIGHT: 187.6 LBS | DIASTOLIC BLOOD PRESSURE: 70 MMHG | BODY MASS INDEX: 29.38 KG/M2 | SYSTOLIC BLOOD PRESSURE: 104 MMHG

## 2019-01-15 DIAGNOSIS — Z67.91 RH NEGATIVE STATE IN ANTEPARTUM PERIOD: ICD-10-CM

## 2019-01-15 DIAGNOSIS — IMO0002 EVALUATE ANATOMY NOT SEEN ON PRIOR SONOGRAM: Primary | ICD-10-CM

## 2019-01-15 DIAGNOSIS — O43.199 BILOBED PLACENTA: ICD-10-CM

## 2019-01-15 DIAGNOSIS — D21.9 FIBROID: ICD-10-CM

## 2019-01-15 DIAGNOSIS — N28.89 DILATED RENAL PELVIS: ICD-10-CM

## 2019-01-15 DIAGNOSIS — O26.899 RH NEGATIVE STATE IN ANTEPARTUM PERIOD: ICD-10-CM

## 2019-01-15 DIAGNOSIS — Z3A.28 28 WEEKS GESTATION OF PREGNANCY: Primary | ICD-10-CM

## 2019-01-15 PROCEDURE — 96372 THER/PROPH/DIAG INJ SC/IM: CPT | Performed by: OBSTETRICS & GYNECOLOGY

## 2019-01-15 PROCEDURE — 0502F SUBSEQUENT PRENATAL CARE: CPT | Performed by: OBSTETRICS & GYNECOLOGY

## 2019-01-15 PROCEDURE — 76816 OB US FOLLOW-UP PER FETUS: CPT | Performed by: OBSTETRICS & GYNECOLOGY

## 2019-01-15 NOTE — PROGRESS NOTES
Here for f/u and u/s  She notes regular fetal movement, denies bleeding or leaking fluid; she notes occ bh ctx but denies any regular ctx  She notes ongoing carpal tunnel symptoms, only wearing brace at night    O: u/s with normal growth at 58 %, renal pelvis at upper normal measuring 3 to 4 mm today, placenta still appears bilobed; small increase in size of anterior fibroid noted     carpal tunnel symptoms-offered consult with hand surgeon, pt declines; she is only wearing brace at night, recommended she add daytime and follow    Rh negative: ab screen then rhogam today    Plan tdap at f/u appt    Increased uppr ext edema and weight gain noted: reviewed preeclamptic warnings, continue baby asa    Advised daily fmc's and reviewed PTL warnings, rtc 2 weeks or prn    rtc 2 weeks

## 2019-01-16 LAB
BLD GP AB SCN SERPL QL: NORMAL
BLD GP AB SCN SERPL QL: POSITIVE
BLOOD GROUP ANTIBODIES SERPL: ABNORMAL
XXX BLOOD GROUP AB TITR SERPL AHG: ABNORMAL {TITER}

## 2019-01-28 ENCOUNTER — APPOINTMENT (OUTPATIENT)
Dept: LAB | Facility: HOSPITAL | Age: 34
End: 2019-01-28

## 2019-01-28 ENCOUNTER — ROUTINE PRENATAL (OUTPATIENT)
Dept: OBSTETRICS AND GYNECOLOGY | Age: 34
End: 2019-01-28

## 2019-01-28 VITALS — WEIGHT: 189 LBS | DIASTOLIC BLOOD PRESSURE: 72 MMHG | BODY MASS INDEX: 29.6 KG/M2 | SYSTOLIC BLOOD PRESSURE: 108 MMHG

## 2019-01-28 DIAGNOSIS — Z3A.29 29 WEEKS GESTATION OF PREGNANCY: Primary | ICD-10-CM

## 2019-01-28 DIAGNOSIS — Z23 NEED FOR DIPHTHERIA-TETANUS-PERTUSSIS (TDAP) VACCINE: ICD-10-CM

## 2019-01-28 DIAGNOSIS — Z67.91 RH NEGATIVE STATE IN ANTEPARTUM PERIOD: ICD-10-CM

## 2019-01-28 DIAGNOSIS — O43.199 BILOBED PLACENTA: ICD-10-CM

## 2019-01-28 DIAGNOSIS — O26.899 CARPAL TUNNEL SYNDROME DURING PREGNANCY: ICD-10-CM

## 2019-01-28 DIAGNOSIS — O16.3 HIGH BLOOD PRESSURE AFFECTING PREGNANCY IN THIRD TRIMESTER, ANTEPARTUM: ICD-10-CM

## 2019-01-28 DIAGNOSIS — G56.00 CARPAL TUNNEL SYNDROME DURING PREGNANCY: ICD-10-CM

## 2019-01-28 DIAGNOSIS — O26.899 RH NEGATIVE STATE IN ANTEPARTUM PERIOD: ICD-10-CM

## 2019-01-28 DIAGNOSIS — D21.9 FIBROID: ICD-10-CM

## 2019-01-28 LAB
ALBUMIN SERPL-MCNC: 3.5 G/DL (ref 3.5–5.2)
ALBUMIN/GLOB SERPL: 1.2 G/DL
ALP SERPL-CCNC: 102 U/L (ref 39–117)
ALT SERPL W P-5'-P-CCNC: 17 U/L (ref 1–33)
ANION GAP SERPL CALCULATED.3IONS-SCNC: 11.7 MMOL/L
AST SERPL-CCNC: 19 U/L (ref 1–32)
BASOPHILS # BLD AUTO: 0.02 10*3/MM3 (ref 0–0.2)
BASOPHILS NFR BLD AUTO: 0.2 % (ref 0–1.5)
BILIRUB SERPL-MCNC: 0.2 MG/DL (ref 0.1–1.2)
BUN BLD-MCNC: 6 MG/DL (ref 6–20)
BUN/CREAT SERPL: 9.7 (ref 7–25)
CALCIUM SPEC-SCNC: 9.2 MG/DL (ref 8.6–10.5)
CHLORIDE SERPL-SCNC: 104 MMOL/L (ref 98–107)
CO2 SERPL-SCNC: 23.3 MMOL/L (ref 22–29)
CREAT BLD-MCNC: 0.62 MG/DL (ref 0.57–1)
CREAT UR-MCNC: 111.3 MG/DL
DEPRECATED RDW RBC AUTO: 44.4 FL (ref 37–54)
EOSINOPHIL # BLD AUTO: 0.11 10*3/MM3 (ref 0–0.7)
EOSINOPHIL NFR BLD AUTO: 0.9 % (ref 0.3–6.2)
ERYTHROCYTE [DISTWIDTH] IN BLOOD BY AUTOMATED COUNT: 13.4 % (ref 11.7–13)
GFR SERPL CREATININE-BSD FRML MDRD: 112 ML/MIN/1.73
GLOBULIN UR ELPH-MCNC: 2.9 GM/DL
GLUCOSE BLD-MCNC: 78 MG/DL (ref 65–99)
HCT VFR BLD AUTO: 40.2 % (ref 35.6–45.5)
HGB BLD-MCNC: 13.5 G/DL (ref 11.9–15.5)
IMM GRANULOCYTES # BLD AUTO: 0.06 10*3/MM3 (ref 0–0.03)
IMM GRANULOCYTES NFR BLD AUTO: 0.5 % (ref 0–0.5)
LYMPHOCYTES # BLD AUTO: 2.73 10*3/MM3 (ref 0.9–4.8)
LYMPHOCYTES NFR BLD AUTO: 23.4 % (ref 19.6–45.3)
MCH RBC QN AUTO: 30.9 PG (ref 26.9–32)
MCHC RBC AUTO-ENTMCNC: 33.6 G/DL (ref 32.4–36.3)
MCV RBC AUTO: 92 FL (ref 80.5–98.2)
MONOCYTES # BLD AUTO: 0.98 10*3/MM3 (ref 0.2–1.2)
MONOCYTES NFR BLD AUTO: 8.4 % (ref 5–12)
NEUTROPHILS # BLD AUTO: 7.75 10*3/MM3 (ref 1.9–8.1)
NEUTROPHILS NFR BLD AUTO: 66.6 % (ref 42.7–76)
PLATELET # BLD AUTO: 222 10*3/MM3 (ref 140–500)
PMV BLD AUTO: 11.5 FL (ref 6–12)
POTASSIUM BLD-SCNC: 3.9 MMOL/L (ref 3.5–5.2)
PROT SERPL-MCNC: 6.4 G/DL (ref 6–8.5)
PROT UR-MCNC: 11 MG/DL
PROT/CREAT UR: 98.8 MG/G CREA (ref 0–200)
RBC # BLD AUTO: 4.37 10*6/MM3 (ref 3.9–5.2)
SODIUM BLD-SCNC: 139 MMOL/L (ref 136–145)
WBC NRBC COR # BLD: 11.65 10*3/MM3 (ref 4.5–10.7)

## 2019-01-28 PROCEDURE — 36415 COLL VENOUS BLD VENIPUNCTURE: CPT | Performed by: OBSTETRICS & GYNECOLOGY

## 2019-01-28 PROCEDURE — 90715 TDAP VACCINE 7 YRS/> IM: CPT | Performed by: OBSTETRICS & GYNECOLOGY

## 2019-01-28 PROCEDURE — 80053 COMPREHEN METABOLIC PANEL: CPT | Performed by: OBSTETRICS & GYNECOLOGY

## 2019-01-28 PROCEDURE — 90471 IMMUNIZATION ADMIN: CPT | Performed by: OBSTETRICS & GYNECOLOGY

## 2019-01-28 PROCEDURE — 85025 COMPLETE CBC W/AUTO DIFF WBC: CPT | Performed by: OBSTETRICS & GYNECOLOGY

## 2019-01-28 PROCEDURE — 82570 ASSAY OF URINE CREATININE: CPT | Performed by: OBSTETRICS & GYNECOLOGY

## 2019-01-28 PROCEDURE — 84156 ASSAY OF PROTEIN URINE: CPT | Performed by: OBSTETRICS & GYNECOLOGY

## 2019-01-28 PROCEDURE — 0502F SUBSEQUENT PRENATAL CARE: CPT | Performed by: OBSTETRICS & GYNECOLOGY

## 2019-01-28 NOTE — PROGRESS NOTES
Pt here for routine follow-up; pt is tracking BP, values 119-130/84-90; she reports she is using splints and she is no longer waking up with pain but has some numbness  She notes positive fetal movement; denies vag bleeding or leaking fluid  O: fh=30 cm, FHT's 124 baseline  Ext: trace edema, no cords  Repeat BP's with /88 and 125/85  A/p: carpal tunnel: some symptoms improvement but ongoing numbness, again recommend referral to hand surgeon and pt agrees. Referral placed    BP stable today and with outside monitoring. Rare values borderline; pt denies headache or vision changes, continue monitoring at work, plan BP check and urine dip 1 week, pt is on baby asa, advised she call with elevated BP or preeclamptic symptoms; will check cbc,cmp and urine protein/creat ratio today    Rh neg: s/p rhogam last week, repeat ab screen positive as expected due to indicated rhogam for fall at 18 weeks    Recommend tdap today and pt agrees

## 2019-02-06 ENCOUNTER — ROUTINE PRENATAL (OUTPATIENT)
Dept: OBSTETRICS AND GYNECOLOGY | Age: 34
End: 2019-02-06

## 2019-02-06 VITALS — WEIGHT: 191 LBS | DIASTOLIC BLOOD PRESSURE: 80 MMHG | SYSTOLIC BLOOD PRESSURE: 120 MMHG | BODY MASS INDEX: 29.91 KG/M2

## 2019-02-06 DIAGNOSIS — Z3A.30 30 WEEKS GESTATION OF PREGNANCY: Primary | ICD-10-CM

## 2019-02-06 PROCEDURE — 0502F SUBSEQUENT PRENATAL CARE: CPT | Performed by: OBSTETRICS & GYNECOLOGY

## 2019-02-06 RX ORDER — DIPHENHYDRAMINE HCL 25 MG
25 CAPSULE ORAL EVERY 6 HOURS PRN
COMMUNITY
End: 2021-04-13

## 2019-02-06 NOTE — PROGRESS NOTES
Pt is here for routine appt; she saw hand surgeon today and he did steroid injection; she notes active fetal movement, denies ctx, vag bleeding or leaking fluid; she denies HA or vision changes but notes mild increase in lower ext swelling    O: trace lower ext edema, 2+ DTR's    A/p: 30 weeks with fibroids: growth appropriate at 27 weeks, plan repeat next week; reviewed risks of PTL, abruption, increased bleeding with fibroids and warnings reviewed    Carpal tunnel: offered work leave and pt desires to continue working currently    Edema/borderline BP: BP stable here, negative protein, pt monitoring BP's at work and most values normal with occasional elevation to 130's over 80's; advise she continue monitoring and call if over 140/90 or with preeclamptic symptoms; will start weekly surveillance    Rh neg: pt s/p rhogam X 2, after fall and routine dosing on 1/15

## 2019-02-12 ENCOUNTER — ROUTINE PRENATAL (OUTPATIENT)
Dept: OBSTETRICS AND GYNECOLOGY | Age: 34
End: 2019-02-12

## 2019-02-12 ENCOUNTER — PROCEDURE VISIT (OUTPATIENT)
Dept: OBSTETRICS AND GYNECOLOGY | Age: 34
End: 2019-02-12

## 2019-02-12 VITALS — SYSTOLIC BLOOD PRESSURE: 100 MMHG | WEIGHT: 190.4 LBS | DIASTOLIC BLOOD PRESSURE: 60 MMHG | BODY MASS INDEX: 29.82 KG/M2

## 2019-02-12 DIAGNOSIS — O26.619 CHOLESTASIS DURING PREGNANCY, ANTEPARTUM: ICD-10-CM

## 2019-02-12 DIAGNOSIS — Z67.91 RH NEGATIVE STATE IN ANTEPARTUM PERIOD: ICD-10-CM

## 2019-02-12 DIAGNOSIS — O43.199 BILOBED PLACENTA: Primary | ICD-10-CM

## 2019-02-12 DIAGNOSIS — O09.819 PREGNANCY RESULTING FROM IN VITRO FERTILIZATION, ANTEPARTUM: ICD-10-CM

## 2019-02-12 DIAGNOSIS — O26.899 RH NEGATIVE STATE IN ANTEPARTUM PERIOD: ICD-10-CM

## 2019-02-12 DIAGNOSIS — O43.199 BILOBED PLACENTA: ICD-10-CM

## 2019-02-12 DIAGNOSIS — O36.60X0 EXCESSIVE FETAL GROWTH AFFECTING MANAGEMENT OF PREGNANCY, ANTEPARTUM, SINGLE OR UNSPECIFIED FETUS: ICD-10-CM

## 2019-02-12 DIAGNOSIS — K83.1 CHOLESTASIS DURING PREGNANCY, ANTEPARTUM: ICD-10-CM

## 2019-02-12 DIAGNOSIS — Z13.1 SCREENING FOR DIABETES MELLITUS: ICD-10-CM

## 2019-02-12 DIAGNOSIS — Z3A.31 31 WEEKS GESTATION OF PREGNANCY: Primary | ICD-10-CM

## 2019-02-12 PROCEDURE — 0502F SUBSEQUENT PRENATAL CARE: CPT | Performed by: OBSTETRICS & GYNECOLOGY

## 2019-02-12 PROCEDURE — 76819 FETAL BIOPHYS PROFIL W/O NST: CPT | Performed by: OBSTETRICS & GYNECOLOGY

## 2019-02-12 NOTE — PROGRESS NOTES
Pt presents for u/s and visit  She is monitoring BP and highest reading 134/94, rest 120's over 80's  She denies abdominal pain, severe headaches or increased swelling  She does note increased itching on arms/chest/hands/feet  O: u/s: growth accelerated at 94 %, BPP 8/8  Ext: trace edema, improved from prior week  A/p: LGA: will check random glucose and hgbA1c, pt passed initial screeen    Itching: will screen for cholestasis with cmp and bile acids, start weekly BPP in case dx confirmed. Symptoms currently mild, advised pt call if worsen

## 2019-02-13 ENCOUNTER — TELEPHONE (OUTPATIENT)
Dept: OBSTETRICS AND GYNECOLOGY | Age: 34
End: 2019-02-13

## 2019-02-13 LAB
ALBUMIN SERPL-MCNC: 3.4 G/DL (ref 3.5–5.5)
ALBUMIN/GLOB SERPL: 1.4 {RATIO} (ref 1.2–2.2)
ALP SERPL-CCNC: 140 IU/L (ref 39–117)
ALT SERPL-CCNC: 30 IU/L (ref 0–32)
AST SERPL-CCNC: 22 IU/L (ref 0–40)
BILIRUB SERPL-MCNC: <0.2 MG/DL (ref 0–1.2)
BUN SERPL-MCNC: 8 MG/DL (ref 6–20)
BUN/CREAT SERPL: 11 (ref 9–23)
CALCIUM SERPL-MCNC: 8.6 MG/DL (ref 8.7–10.2)
CHLORIDE SERPL-SCNC: 107 MMOL/L (ref 96–106)
CO2 SERPL-SCNC: 22 MMOL/L (ref 20–29)
CREAT SERPL-MCNC: 0.7 MG/DL (ref 0.57–1)
GLOBULIN SER CALC-MCNC: 2.4 G/DL (ref 1.5–4.5)
GLUCOSE SERPL-MCNC: 100 MG/DL (ref 65–99)
HBA1C MFR BLD: 5.4 % (ref 4.8–5.6)
POTASSIUM SERPL-SCNC: 4.4 MMOL/L (ref 3.5–5.2)
PROT SERPL-MCNC: 5.8 G/DL (ref 6–8.5)
SODIUM SERPL-SCNC: 142 MMOL/L (ref 134–144)

## 2019-02-13 NOTE — TELEPHONE ENCOUNTER
----- Message from Rea Stone MD sent at 2/13/2019 10:08 AM EST -----  Please call Carey, her AST/ALT, hgbA1c and bilirubin are normal. Bile acids are pending.

## 2019-02-14 ENCOUNTER — TELEPHONE (OUTPATIENT)
Dept: OBSTETRICS AND GYNECOLOGY | Age: 34
End: 2019-02-14

## 2019-02-14 LAB — BILE AC SERPL-SCNC: 14.4 UMOL/L (ref 4.7–24.5)

## 2019-02-14 NOTE — TELEPHONE ENCOUNTER
LM for pt to return our call, Dr Batista wants her to come in 2-15-19 12:30 for Nst and discuss labs

## 2019-02-14 NOTE — TELEPHONE ENCOUNTER
----- Message from Rea Stone MD sent at 2/14/2019  1:42 PM EST -----  Call pt, her bile acids are marginal, have her come in tomorrow for visit, NST and to review symptoms/discuss treatment

## 2019-02-15 ENCOUNTER — ROUTINE PRENATAL (OUTPATIENT)
Dept: OBSTETRICS AND GYNECOLOGY | Age: 34
End: 2019-02-15

## 2019-02-15 ENCOUNTER — HOSPITAL ENCOUNTER (OUTPATIENT)
Facility: HOSPITAL | Age: 34
Discharge: HOME | End: 2019-02-16
Attending: OBSTETRICS & GYNECOLOGY | Admitting: OBSTETRICS & GYNECOLOGY

## 2019-02-15 VITALS — WEIGHT: 189 LBS | SYSTOLIC BLOOD PRESSURE: 108 MMHG | BODY MASS INDEX: 29.6 KG/M2 | DIASTOLIC BLOOD PRESSURE: 60 MMHG

## 2019-02-15 DIAGNOSIS — O12.10 PROTEINURIA AFFECTING PREGNANCY, ANTEPARTUM: ICD-10-CM

## 2019-02-15 DIAGNOSIS — O26.899 RH NEGATIVE STATE IN ANTEPARTUM PERIOD: ICD-10-CM

## 2019-02-15 DIAGNOSIS — O26.619 CHOLESTASIS DURING PREGNANCY, ANTEPARTUM: ICD-10-CM

## 2019-02-15 DIAGNOSIS — D21.9 FIBROID: ICD-10-CM

## 2019-02-15 DIAGNOSIS — Z67.91 RH NEGATIVE STATE IN ANTEPARTUM PERIOD: ICD-10-CM

## 2019-02-15 DIAGNOSIS — Z3A.32 32 WEEKS GESTATION OF PREGNANCY: Primary | ICD-10-CM

## 2019-02-15 DIAGNOSIS — K83.1 CHOLESTASIS DURING PREGNANCY, ANTEPARTUM: ICD-10-CM

## 2019-02-15 PROBLEM — O16.9 HYPERTENSION AFFECTING PREGNANCY: Status: ACTIVE | Noted: 2019-02-15

## 2019-02-15 PROBLEM — O13.3 GESTATIONAL HYPERTENSION, THIRD TRIMESTER: Status: ACTIVE | Noted: 2019-02-15

## 2019-02-15 PROBLEM — L29.9 PRURITUS: Status: ACTIVE | Noted: 2019-02-15

## 2019-02-15 LAB
ALBUMIN SERPL-MCNC: 3.2 G/DL (ref 3.5–5.2)
ALBUMIN/GLOB SERPL: 1.2 G/DL
ALP SERPL-CCNC: 127 U/L (ref 39–117)
ALT SERPL W P-5'-P-CCNC: 23 U/L (ref 1–33)
ANION GAP SERPL CALCULATED.3IONS-SCNC: 13 MMOL/L
AST SERPL-CCNC: 17 U/L (ref 1–32)
BASOPHILS # BLD AUTO: 0.04 10*3/MM3 (ref 0–0.2)
BASOPHILS NFR BLD AUTO: 0.3 % (ref 0–1.5)
BILIRUB SERPL-MCNC: 0.3 MG/DL (ref 0.1–1.2)
BUN BLD-MCNC: 9 MG/DL (ref 6–20)
BUN/CREAT SERPL: 12.3 (ref 7–25)
CALCIUM SPEC-SCNC: 8.8 MG/DL (ref 8.6–10.5)
CHLORIDE SERPL-SCNC: 106 MMOL/L (ref 98–107)
CO2 SERPL-SCNC: 23 MMOL/L (ref 22–29)
CREAT BLD-MCNC: 0.73 MG/DL (ref 0.57–1)
CREAT UR-MCNC: 63.7 MG/DL
DEPRECATED RDW RBC AUTO: 45.1 FL (ref 37–54)
EOSINOPHIL # BLD AUTO: 0.12 10*3/MM3 (ref 0–0.4)
EOSINOPHIL NFR BLD AUTO: 1 % (ref 0.3–6.2)
ERYTHROCYTE [DISTWIDTH] IN BLOOD BY AUTOMATED COUNT: 13.4 % (ref 12.3–15.4)
EXPIRATION DATE: ABNORMAL
GFR SERPL CREATININE-BSD FRML MDRD: 93 ML/MIN/1.73
GLOBULIN UR ELPH-MCNC: 2.7 GM/DL
GLUCOSE BLD-MCNC: 89 MG/DL (ref 65–99)
HCT VFR BLD AUTO: 38.4 % (ref 34–46.6)
HGB BLD-MCNC: 12.6 G/DL (ref 12–15.9)
IMM GRANULOCYTES # BLD AUTO: 0.07 10*3/MM3 (ref 0–0.05)
IMM GRANULOCYTES NFR BLD AUTO: 0.6 % (ref 0–0.5)
LYMPHOCYTES # BLD AUTO: 2.9 10*3/MM3 (ref 0.7–3.1)
LYMPHOCYTES NFR BLD AUTO: 24.6 % (ref 19.6–45.3)
Lab: ABNORMAL
MCH RBC QN AUTO: 30.4 PG (ref 26.6–33)
MCHC RBC AUTO-ENTMCNC: 32.8 G/DL (ref 31.5–35.7)
MCV RBC AUTO: 92.5 FL (ref 79–97)
MONOCYTES # BLD AUTO: 1.01 10*3/MM3 (ref 0.1–0.9)
MONOCYTES NFR BLD AUTO: 8.6 % (ref 5–12)
NEUTROPHILS # BLD AUTO: 7.66 10*3/MM3 (ref 1.4–7)
NEUTROPHILS NFR BLD AUTO: 64.9 % (ref 42.7–76)
NRBC BLD AUTO-RTO: 0 /100 WBC (ref 0–0)
PLATELET # BLD AUTO: 182 10*3/MM3 (ref 140–450)
PMV BLD AUTO: 11.6 FL (ref 6–12)
POTASSIUM BLD-SCNC: 4.1 MMOL/L (ref 3.5–5.2)
PROT SERPL-MCNC: 5.9 G/DL (ref 6–8.5)
PROT UR STRIP-MCNC: ABNORMAL MG/DL
PROT UR-MCNC: 25 MG/DL
PROT/CREAT UR: 392.5 MG/G CREA (ref 0–200)
RBC # BLD AUTO: 4.15 10*6/MM3 (ref 3.77–5.28)
SODIUM BLD-SCNC: 142 MMOL/L (ref 136–145)
WBC NRBC COR # BLD: 11.8 10*3/MM3 (ref 3.4–10.8)

## 2019-02-15 PROCEDURE — 84156 ASSAY OF PROTEIN URINE: CPT | Performed by: OBSTETRICS & GYNECOLOGY

## 2019-02-15 PROCEDURE — 99218 PR INITIAL OBSERVATION CARE/DAY 30 MINUTES: CPT | Performed by: OBSTETRICS & GYNECOLOGY

## 2019-02-15 PROCEDURE — G0463 HOSPITAL OUTPT CLINIC VISIT: HCPCS

## 2019-02-15 PROCEDURE — 82570 ASSAY OF URINE CREATININE: CPT | Performed by: OBSTETRICS & GYNECOLOGY

## 2019-02-15 PROCEDURE — 0502F SUBSEQUENT PRENATAL CARE: CPT | Performed by: OBSTETRICS & GYNECOLOGY

## 2019-02-15 PROCEDURE — 81002 URINALYSIS NONAUTO W/O SCOPE: CPT | Performed by: OBSTETRICS & GYNECOLOGY

## 2019-02-15 PROCEDURE — 85025 COMPLETE CBC W/AUTO DIFF WBC: CPT | Performed by: OBSTETRICS & GYNECOLOGY

## 2019-02-15 PROCEDURE — 80053 COMPREHEN METABOLIC PANEL: CPT | Performed by: OBSTETRICS & GYNECOLOGY

## 2019-02-15 RX ORDER — FAMOTIDINE 20 MG/1
20 TABLET, FILM COATED ORAL EVERY 12 HOURS PRN
Status: DISCONTINUED | OUTPATIENT
Start: 2019-02-15 | End: 2019-02-17 | Stop reason: HOSPADM

## 2019-02-15 RX ORDER — LABETALOL 200 MG/1
200 TABLET, FILM COATED ORAL EVERY 12 HOURS SCHEDULED
Status: DISCONTINUED | OUTPATIENT
Start: 2019-02-15 | End: 2019-02-17 | Stop reason: HOSPADM

## 2019-02-15 RX ORDER — URSODIOL 300 MG/1
300 CAPSULE ORAL 3 TIMES DAILY
Qty: 90 CAPSULE | Refills: 1 | Status: SHIPPED | OUTPATIENT
Start: 2019-02-15 | End: 2019-03-02 | Stop reason: HOSPADM

## 2019-02-15 RX ORDER — SIMETHICONE 20 MG/.3ML
20 EMULSION ORAL 4 TIMES DAILY PRN
Status: DISCONTINUED | OUTPATIENT
Start: 2019-02-15 | End: 2019-02-17 | Stop reason: HOSPADM

## 2019-02-15 RX ORDER — CALCIUM CARBONATE 200(500)MG
2 TABLET,CHEWABLE ORAL 3 TIMES DAILY PRN
Status: DISCONTINUED | OUTPATIENT
Start: 2019-02-15 | End: 2019-02-17 | Stop reason: HOSPADM

## 2019-02-15 RX ORDER — URSODIOL 300 MG/1
300 CAPSULE ORAL 2 TIMES DAILY
Status: DISCONTINUED | OUTPATIENT
Start: 2019-02-15 | End: 2019-02-17 | Stop reason: HOSPADM

## 2019-02-15 RX ADMIN — URSODIOL 300 MG: 300 CAPSULE ORAL at 21:28

## 2019-02-15 RX ADMIN — LABETALOL HCL 200 MG: 200 TABLET, FILM COATED ORAL at 21:57

## 2019-02-15 NOTE — H&P
History and physical    Admission date 2/15/2019     Patient: Carey Armando MRN: [id number]   YOB: 1985 Age: 33 y.o. Sex: female     Chief Complaint   Patient presents with   • elevated BP in office     presents to L&D from office with elevated BP, reports +Fm denies Vb or LOF, denies HA, vision hanges or RUQ pain        HPI:    Carey Armando is a 33 y.o.,  AT 32w0d sent over from the office for evaluation of of elevated blood pressure at 32 weeks gestation, cholestasis, and nonreactive nonstress test.  Patient overall has been doing relatively well but started having some increased pruritus recently, most prominently on the hands and feet.  Bile acids were in the upper levels of normal.  Her symptoms are consistent with cholestasis of pregnancy.  Liver enzymes were normal.  She does not have headache or visual changes.. Denies vaginal bleeding, leakage of fluid, or contractions. Admits to good fetal movement.   She was dipping positive for 1+ protein in our office and this is a new finding as well.      Past Medical History:   Diagnosis Date   • [place] product of in vitro fertilization (IVF) pregnancy 2018     Past Surgical History:   Procedure Laterality Date   • LASIK       No current facility-administered medications on file prior to encounter.      Current Outpatient Medications on File Prior to Encounter   Medication Sig Dispense Refill   • aspirin 81 MG EC tablet Take 81 mg by mouth Daily.     • diphenhydrAMINE (BENADRYL) 25 mg capsule Take 25 mg by mouth Every 6 (Six) Hours As Needed for Itching.     • docusate sodium (COLACE) 100 MG capsule Take 100 mg by mouth 2 (Two) Times a Day.     • Multiple Vitamins-Minerals (MULTIVITAL) tablet Take 1 tablet by mouth Daily.     • Prenatal Vit-Fe Fumarate-FA (PRENATAL, CLASSIC, VITAMIN) 28-0.8 MG tablet tablet Take  by mouth Daily.     • ursodiol (ACTIGALL) 300 MG capsule Take 1 capsule by mouth 3 (Three) Times a Day. 90 capsule 1       ROS:       Except as outlined in history of physical illness, patient denies any changes in her GYN, , GI systems. All other systems reviewed are negative.      OBJECTIVE:     Vitals:   Vitals:    02/15/19 1502 02/15/19 1512 02/15/19 1522 02/15/19 1532   BP: 143/98 139/93 131/90 136/91   Pulse: 71 67 74 69   Temp:       TempSrc:       Weight:       Height:             Appearance/Psychiatric: In no distress   Constitutional: The patient is well nourished   Cardiovascular: She does not have edema. Heart RRR  Respiratory: Respiratory effort is normal. CTAB   Abdomen: Soft, gravid.  Ext: nontender, 1 plus ,  edema. +2/4 bilateral patellar reflexes   Cx; deferred      Patient Active Problem List    Diagnosis   • Dilated renal pelvis [N28.89]   • Pregnancy [Z34.90]   • Fall [W19.XXXA]   • Bilobed placenta [O43.899]   • Pregnancy resulting from in vitro fertilization, antepartum [O09.819]   • Rh negative state in antepartum period [O09.899, Z67.91]   • Fibroid [D21.9]       LOS: 0 days    Roddy Che MD   February 15, 2019    Assessment and Plan:     32-week 0-day intrauterine pregnancy  Nonreactive nonstress test in the office  Elevated blood pressures in the office with 1+ proteinuria.  Blood pressures are still elevated initially upon arrival at labor and delivery.  Worsening pruritus especially prominent on the hands and feet with bile acids in the very upper limits of normal.    Will admit, check appropriate labs, start a 24-hour urine for protein and creatinine clearance.  As per  today's  clinic note, MFM consult will be requested  Patient was given a prescription for  Actigall.  I have reviewed situation with patient and her family and they voiced understanding.

## 2019-02-15 NOTE — PLAN OF CARE
Problem: Patient Care Overview  Goal: Plan of Care Review  Outcome: Ongoing (interventions implemented as appropriate)   02/15/19 6809   Coping/Psychosocial   Plan of Care Reviewed With patient;family   Plan of Care Review   Progress no change   OTHER   Outcome Summary Up on unit for 24 hour urine protein; doing well overall     Goal: Individualization and Mutuality  Outcome: Ongoing (interventions implemented as appropriate)    Goal: Discharge Needs Assessment  Outcome: Ongoing (interventions implemented as appropriate)    Goal: Interprofessional Rounds/Family Conf  Outcome: Ongoing (interventions implemented as appropriate)

## 2019-02-15 NOTE — PROGRESS NOTES
Pt comes in for requested f/u visit; She notes overall pruritis slightly improved but still significant in hands and worse at night; she denies headache or vision changes; BP at work 130's over 90's; she also complains of worse heartburn this week  NST: non-reactive, 10 beat accels noted, 1 -15 beat accel noted at beginning of strip  Repeat BP: 140/90  Ext: trace edema, 2+ DTR's  A/p: 32 weeks with probable cholestasis; pt has characteristic symptoms and bile acids were 14.4 with 14.5 being upper limit for pregnancy. Pt's mother is present and notes signficant itching with her pregnancies and early deliveries so suspicious for positive family hx. Pt counseled. Recommend Actigall and sent rx with instructions. Advised twice weekly surveillance and discussed need for delivery. Most recommend delivery about 36 weeks.   Will order MFM consult for additional input but pt understands. NST today is not reactive. Pt does have 10 beat accels but not 15 beat, no significant decels noted. Will obtain further monitoring on L&D    1+ protein, repeat BP higher and pt notes elevations at work: plan serial BP's and labs at L&D today

## 2019-02-16 ENCOUNTER — APPOINTMENT (OUTPATIENT)
Dept: ULTRASOUND IMAGING | Facility: HOSPITAL | Age: 34
End: 2019-02-16

## 2019-02-16 VITALS
HEIGHT: 67 IN | HEART RATE: 70 BPM | BODY MASS INDEX: 29.66 KG/M2 | OXYGEN SATURATION: 100 % | RESPIRATION RATE: 18 BRPM | SYSTOLIC BLOOD PRESSURE: 140 MMHG | WEIGHT: 189 LBS | TEMPERATURE: 98 F | DIASTOLIC BLOOD PRESSURE: 90 MMHG

## 2019-02-16 PROBLEM — O26.643 CHOLESTASIS DURING PREGNANCY IN THIRD TRIMESTER: Status: ACTIVE | Noted: 2019-02-16

## 2019-02-16 PROBLEM — O26.613 CHOLESTASIS DURING PREGNANCY IN THIRD TRIMESTER: Status: ACTIVE | Noted: 2019-02-16

## 2019-02-16 PROBLEM — O14.93 PRE-ECLAMPSIA IN THIRD TRIMESTER: Status: ACTIVE | Noted: 2019-02-15

## 2019-02-16 PROBLEM — K83.1 CHOLESTASIS DURING PREGNANCY IN THIRD TRIMESTER: Status: ACTIVE | Noted: 2019-02-16

## 2019-02-16 LAB
ALBUMIN SERPL-MCNC: 2.8 G/DL (ref 3.5–5.2)
ALBUMIN/GLOB SERPL: 1 G/DL
ALP SERPL-CCNC: 125 U/L (ref 39–117)
ALT SERPL W P-5'-P-CCNC: 21 U/L (ref 1–33)
ANION GAP SERPL CALCULATED.3IONS-SCNC: 13.1 MMOL/L
AST SERPL-CCNC: 16 U/L (ref 1–32)
BILIRUB SERPL-MCNC: 0.3 MG/DL (ref 0.1–1.2)
BUN BLD-MCNC: 8 MG/DL (ref 6–20)
BUN/CREAT SERPL: 11.3 (ref 7–25)
CALCIUM SPEC-SCNC: 9.4 MG/DL (ref 8.6–10.5)
CHLORIDE SERPL-SCNC: 106 MMOL/L (ref 98–107)
CO2 SERPL-SCNC: 21.9 MMOL/L (ref 22–29)
COLLECT DURATION TIME UR: 24 HRS
CREAT BLD-MCNC: 0.71 MG/DL (ref 0.57–1)
GFR SERPL CREATININE-BSD FRML MDRD: 96 ML/MIN/1.73
GLOBULIN UR ELPH-MCNC: 2.8 GM/DL
GLUCOSE BLD-MCNC: 87 MG/DL (ref 65–99)
POTASSIUM BLD-SCNC: 4.2 MMOL/L (ref 3.5–5.2)
PROT 24H UR-MRATE: 636.5 MG/24HOURS (ref 0–150)
PROT SERPL-MCNC: 5.6 G/DL (ref 6–8.5)
PROT UR-MCNC: 19 MG/DL
SODIUM BLD-SCNC: 141 MMOL/L (ref 136–145)
SPECIMEN VOL 24H UR: 3350 ML

## 2019-02-16 PROCEDURE — 59025 FETAL NON-STRESS TEST: CPT | Performed by: OBSTETRICS & GYNECOLOGY

## 2019-02-16 PROCEDURE — 99217 PR OBSERVATION CARE DISCHARGE MANAGEMENT: CPT | Performed by: OBSTETRICS & GYNECOLOGY

## 2019-02-16 PROCEDURE — 84156 ASSAY OF PROTEIN URINE: CPT | Performed by: OBSTETRICS & GYNECOLOGY

## 2019-02-16 PROCEDURE — 81050 URINALYSIS VOLUME MEASURE: CPT | Performed by: OBSTETRICS & GYNECOLOGY

## 2019-02-16 PROCEDURE — 59025 FETAL NON-STRESS TEST: CPT

## 2019-02-16 PROCEDURE — 80053 COMPREHEN METABOLIC PANEL: CPT | Performed by: OBSTETRICS & GYNECOLOGY

## 2019-02-16 PROCEDURE — 76819 FETAL BIOPHYS PROFIL W/O NST: CPT

## 2019-02-16 RX ORDER — LABETALOL 200 MG/1
200 TABLET, FILM COATED ORAL EVERY 12 HOURS SCHEDULED
Qty: 60 TABLET | Refills: 2 | Status: ON HOLD | OUTPATIENT
Start: 2019-02-16 | End: 2019-03-02 | Stop reason: SDUPTHER

## 2019-02-16 RX ORDER — ACETAMINOPHEN 325 MG/1
650 TABLET ORAL EVERY 6 HOURS PRN
Status: DISCONTINUED | OUTPATIENT
Start: 2019-02-16 | End: 2019-02-17 | Stop reason: HOSPADM

## 2019-02-16 RX ADMIN — ACETAMINOPHEN 650 MG: 325 TABLET, FILM COATED ORAL at 01:03

## 2019-02-16 RX ADMIN — ACETAMINOPHEN 650 MG: 325 TABLET, FILM COATED ORAL at 08:42

## 2019-02-16 RX ADMIN — URSODIOL 300 MG: 300 CAPSULE ORAL at 08:37

## 2019-02-16 RX ADMIN — LABETALOL HCL 200 MG: 200 TABLET, FILM COATED ORAL at 20:33

## 2019-02-16 RX ADMIN — ACETAMINOPHEN 650 MG: 325 TABLET, FILM COATED ORAL at 14:50

## 2019-02-16 RX ADMIN — URSODIOL 300 MG: 300 CAPSULE ORAL at 20:33

## 2019-02-16 RX ADMIN — LABETALOL HCL 200 MG: 200 TABLET, FILM COATED ORAL at 08:37

## 2019-02-16 NOTE — PLAN OF CARE
Problem: Patient Care Overview  Goal: Individualization and Mutuality   02/15/19 6774   Individualization   Patient Specific Interventions Actigall; 24 hour urine protein   Mutuality/Individual Preferences   How to Address Anxieties/Fears Keep informed of POC

## 2019-02-16 NOTE — NON STRESS TEST
Carey Armando, a  at 32w1d with an LESLY of 2019, by Other Basis, was seen at [facility] ANTEPARTUM UNIT for a nonstress test.    Chief Complaint   Patient presents with   • elevated BP in office     presents to L&D from office with elevated BP, reports +Fm denies Vb or LOF, denies HA, vision hanges or RUQ pain        Patient Active Problem List   Diagnosis   • Pregnancy resulting from in vitro fertilization, antepartum   • Rh negative state in antepartum period   • Fibroid   • Bilobed placenta   • Pregnancy   • Fall   • Dilated renal pelvis   • Hypertension affecting pregnancy   • Gestational hypertension, third trimester   • Pruritus       Start Time: 951  Stop Time: 1028    Interpretation A  Nonstress Test Interpretation A: Reactive (19 1030 : Lizz Glass, RN)

## 2019-02-16 NOTE — PLAN OF CARE
Problem: Hypertensive Disorders in Pregnancy (Adult,Obstetrics,Pediatric)  Goal: Signs and Symptoms of Listed Potential Problems Will be Absent, Minimized or Managed (Hypertensive Disorders in Pregnancy)  Outcome: Ongoing (interventions implemented as appropriate)  Elevated blood pressures with diastolic range over 100, resolved with repositioning, providing quiet environment, and labatolol 200 mg as ordered. Blood pressures returned to WNL.

## 2019-02-16 NOTE — PLAN OF CARE
Problem: Fall Risk,  (Adult,Obstetrics,Pediatric)  Goal: Identify Related Risk Factors and Signs and Symptoms  Outcome: Ongoing (interventions implemented as appropriate)   19 0512   Fall Risk,  (Adult,Obstetrics,Pediatric)   Related Risk Factors (Fall Risk, ) pregnancy weight gain

## 2019-02-17 NOTE — PROGRESS NOTES
Antepartum Progress      2019    Name:Carey Armando    MR#:[id number]    Progress note                        HD:0    Subjective     Notes normal fetal movement, no cramping/contractions, no loss of fluid     Earlier today had a headache but is now resolved, believes may be secondary to starting the labetalol. She denies any visual changes, epigastric pain.  She would like to go home if she can. She lives near the hospital. 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 yo Female  at 32w1d     Patient Active Problem List   Diagnosis   • Pregnancy resulting from in vitro fertilization, antepartum   • Rh negative state in antepartum period   • Fibroid   • Bilobed placenta   • Pregnancy   • Fall   • Dilated renal pelvis   • Hypertension affecting pregnancy   • Gestational hypertension, third trimester   • Pruritus        Review of system    Review of Systems- denies nausea, vomiting, chest pain, shortness of breath, no current headache    Objective    Vitals  Temp:  Temp:  [97.9 °F (36.6 °C)-98 °F (36.7 °C)] 97.9 °F (36.6 °C)  Temp src: Oral  BP:  BP: (117-150)/() 132/87  Pulse:  Heart Rate:  [63-70] 65  RR:   Resp:  [18] 18    Wt Readings from Last 3 Encounters:   02/15/19 85.7 kg (189 lb)   02/15/19 85.7 kg (189 lb)   19 86.4 kg (190 lb 6.4 oz)       Body mass index is 29.6 kg/m².    Exam:    Physical Exam   Appears well, no distress  Lungs CTAB  Heart RRR, no M/R/G  Abdomen nontender, uterus soft, nontender  No peripheral edema noted  Normal reflex patella    I/O last 3 completed shifts:  In: 750 [P.O.:750]  Out: 2850 [Urine:2850]    Lab Results (last 24 hours)     Procedure Component Value Units Date/Time    Protein, Urine, 24 Hour - Urine, Clean Catch [[id number]]  (Abnormal) Collected:  19 1608    Specimen:  24 Hour Urine from Urine, Clean Catch Updated:  19 1724     Protein, 24H Urine 636.5 mg/24hours      Total Protein, Urine 19.0 mg/dL      24H Urine  Volume 3,350 mL      Time (Hours) 24 hrs     Comprehensive Metabolic Panel [[id number]]  (Abnormal) Collected:  02/16/19 0546    Specimen:  Blood Updated:  02/16/19 0708     Glucose 87 mg/dL      BUN 8 mg/dL      Creatinine 0.71 mg/dL      Sodium 141 mmol/L      Potassium 4.2 mmol/L      Chloride 106 mmol/L      CO2 21.9 mmol/L      Calcium 9.4 mg/dL      Total Protein 5.6 g/dL      Albumin 2.80 g/dL      ALT (SGPT) 21 U/L      AST (SGOT) 16 U/L      Alkaline Phosphatase 125 U/L      Total Bilirubin 0.3 mg/dL      eGFR Non African Amer 96 mL/min/1.73      Globulin 2.8 gm/dL      A/G Ratio 1.0 g/dL      BUN/Creatinine Ratio 11.3     Anion Gap 13.1 mmol/L     Narrative:       GFR Normal >60  Chronic Kidney Disease <60  Kidney Failure <15        Tracing reactive, baseline 135, no decelerations, + accelerations  No contractions    BPP 8/8     Assessment/Plan  1. Intrauterine pregnancy at 32w1d with preeclampsia without severe features. Blood pressures are in mild range, no other severe features. Meets criteria with protein 636 mg and elevated blood pressures. Discussed outpatient management with her, need for blood pressure monitoring and has cuff at home. Discussed that since she is very active at work would recommend stopping work for now. Reviewed signs and symptoms of severe preeclampsia and when to present to the hospital. To follow up in the office twice weekly for close monitoring.  2. Cholestasis- bile acids at upper limit of normal. Has started actigall. Has upcoming appointment for MFM consultation.  3. Today on sonogram one fetal kidney was not able to be visualized- on repeat sonogram with MFM needs repeated anatomic screening    Plan of care discussed with nursing, to DC this evening.    Ruthie Dumont MD  2/16/2019 7:33 PM

## 2019-02-17 NOTE — NON STRESS TEST
Carey Armando, a  at 32w1d with an LESLY of 2019, by Other Basis, was seen at [facility]TEPARTUM UNIT for a nonstress test.    Chief Complaint   Patient presents with   • elevated BP in office     presents to L&D from office with elevated BP, reports +Fm denies Vb or LOF, denies HA, vision hanges or RUQ pain        Patient Active Problem List   Diagnosis   • Pregnancy resulting from in vitro fertilization, antepartum   • Rh negative state in antepartum period   • Fibroid   • Bilobed placenta   • Pregnancy   • Fall   • Dilated renal pelvis   • Hypertension affecting pregnancy   • Pre-eclampsia in third trimester   • Pruritus   • Cholestasis during pregnancy in third trimester       Start Time:   Stop Time:   Interpretation A  Nonstress Test Interpretation A: Reactive (19 : Lizz Glass, RN)

## 2019-02-17 NOTE — DISCHARGE INSTR - ACTIVITY
Keep scheduled appointment with [facility], or sooner if needed.  Return to labor and delivery immediately if symptoms of headache unresolved by Tylenol, regular contractions, leaking of fluid, vaginal bleeding, decreased fetal movement, epigastric pain or tenderness, or nausea/vomiting. Reviewed discharge instructions, self-care, and when to seek care. Questions answered, written instructions given.

## 2019-02-19 ENCOUNTER — ROUTINE PRENATAL (OUTPATIENT)
Dept: OBSTETRICS AND GYNECOLOGY | Age: 34
End: 2019-02-19

## 2019-02-19 ENCOUNTER — PROCEDURE VISIT (OUTPATIENT)
Dept: OBSTETRICS AND GYNECOLOGY | Age: 34
End: 2019-02-19

## 2019-02-19 VITALS — DIASTOLIC BLOOD PRESSURE: 90 MMHG | BODY MASS INDEX: 30.2 KG/M2 | SYSTOLIC BLOOD PRESSURE: 138 MMHG | WEIGHT: 192.8 LBS

## 2019-02-19 DIAGNOSIS — Z3A.32 32 WEEKS GESTATION OF PREGNANCY: Primary | ICD-10-CM

## 2019-02-19 DIAGNOSIS — O14.93 PRE-ECLAMPSIA IN THIRD TRIMESTER: Primary | ICD-10-CM

## 2019-02-19 DIAGNOSIS — K83.1 CHOLESTASIS DURING PREGNANCY IN THIRD TRIMESTER: ICD-10-CM

## 2019-02-19 DIAGNOSIS — O14.93 PRE-ECLAMPSIA IN THIRD TRIMESTER: ICD-10-CM

## 2019-02-19 DIAGNOSIS — O09.819 PREGNANCY RESULTING FROM IN VITRO FERTILIZATION, ANTEPARTUM: ICD-10-CM

## 2019-02-19 DIAGNOSIS — O26.613 CHOLESTASIS DURING PREGNANCY IN THIRD TRIMESTER: ICD-10-CM

## 2019-02-19 PROCEDURE — 76819 FETAL BIOPHYS PROFIL W/O NST: CPT | Performed by: OBSTETRICS & GYNECOLOGY

## 2019-02-19 PROCEDURE — 0502F SUBSEQUENT PRENATAL CARE: CPT | Performed by: OBSTETRICS & GYNECOLOGY

## 2019-02-19 NOTE — PROGRESS NOTES
Pt comes in for f/u today; she was diagnosed with preeclampsia during her hospitalization  She currently denies severe headache, vision changes or abdominal pain; she notes active fetal movement  She is monitoring BP at home and values 127-138/89 - 94  O: ext: trace edema, 2+ DTR's  A/p: 32 weeks with preeclampsia: pt requested hospital discharge and is monitoring BP at home. She is taking labetalol twice daily. Reviewed preeclamptic warnings and counseled pt regarding risks to include stroke, abruption and seizure associated with preeclampsia. Pt currently desires ongoing outpatient management. Advised she seek emergency evaluation with any significant symptoms or BP elevations; plan repeat cbc,cmp today; plan BPP today; fetal movement noted with auscultation and audible accel from 140's to 160's noted at bedside;     Cholestasis: symptoms resolved with actigall, repeat bile acids today; consult with MFM scheduled later this week for recommendations regarding delivery timing; will likely plan 36 weeks given preeclampsia as well and pt understands    U/S at hospital with limited view of kidneys and only 1 noted with MFM review. Kidneys noted with prior u/s at [facility], plan further evaluation during MFM consult

## 2019-02-19 NOTE — PROGRESS NOTES
Addendum: BPP = 8/8, swathi normal at 16; NST done due to recorded FHR with u/s varying from 113 to 136. NST Reactive and without significant decelerations    Plan f/u 3 days or prn and pt agrees

## 2019-02-21 LAB
ALBUMIN SERPL-MCNC: 3.3 G/DL (ref 3.5–5.5)
ALBUMIN/GLOB SERPL: 1.5 {RATIO} (ref 1.2–2.2)
ALP SERPL-CCNC: 145 IU/L (ref 39–117)
ALT SERPL-CCNC: 17 IU/L (ref 0–32)
AST SERPL-CCNC: 18 IU/L (ref 0–40)
BASOPHILS # BLD AUTO: 0 X10E3/UL (ref 0–0.2)
BASOPHILS NFR BLD AUTO: 0 %
BILE AC SERPL-SCNC: 26.7 UMOL/L (ref 4.7–24.5)
BILIRUB SERPL-MCNC: <0.2 MG/DL (ref 0–1.2)
BUN SERPL-MCNC: 13 MG/DL (ref 6–20)
BUN/CREAT SERPL: 16 (ref 9–23)
CALCIUM SERPL-MCNC: 8.8 MG/DL (ref 8.7–10.2)
CHLORIDE SERPL-SCNC: 109 MMOL/L (ref 96–106)
CO2 SERPL-SCNC: 19 MMOL/L (ref 20–29)
CREAT SERPL-MCNC: 0.8 MG/DL (ref 0.57–1)
EOSINOPHIL # BLD AUTO: 0.2 X10E3/UL (ref 0–0.4)
EOSINOPHIL NFR BLD AUTO: 1 %
ERYTHROCYTE [DISTWIDTH] IN BLOOD BY AUTOMATED COUNT: 13.7 % (ref 12.3–15.4)
GLOBULIN SER CALC-MCNC: 2.2 G/DL (ref 1.5–4.5)
GLUCOSE SERPL-MCNC: 112 MG/DL (ref 65–99)
HCT VFR BLD AUTO: 39.2 % (ref 34–46.6)
HGB BLD-MCNC: 13.2 G/DL (ref 11.1–15.9)
IMM GRANULOCYTES # BLD AUTO: 0 X10E3/UL (ref 0–0.1)
IMM GRANULOCYTES NFR BLD AUTO: 0 %
LYMPHOCYTES # BLD AUTO: 2.8 X10E3/UL (ref 0.7–3.1)
LYMPHOCYTES NFR BLD AUTO: 26 %
MCH RBC QN AUTO: 30.4 PG (ref 26.6–33)
MCHC RBC AUTO-ENTMCNC: 33.7 G/DL (ref 31.5–35.7)
MCV RBC AUTO: 90 FL (ref 79–97)
MONOCYTES # BLD AUTO: 0.9 X10E3/UL (ref 0.1–0.9)
MONOCYTES NFR BLD AUTO: 8 %
NEUTROPHILS # BLD AUTO: 6.9 X10E3/UL (ref 1.4–7)
NEUTROPHILS NFR BLD AUTO: 65 %
PLATELET # BLD AUTO: 199 X10E3/UL (ref 150–379)
POTASSIUM SERPL-SCNC: 4.9 MMOL/L (ref 3.5–5.2)
PROT SERPL-MCNC: 5.5 G/DL (ref 6–8.5)
RBC # BLD AUTO: 4.34 X10E6/UL (ref 3.77–5.28)
SODIUM SERPL-SCNC: 141 MMOL/L (ref 134–144)
WBC # BLD AUTO: 10.8 X10E3/UL (ref 3.4–10.8)

## 2019-02-22 ENCOUNTER — HOSPITAL ENCOUNTER (OUTPATIENT)
Dept: ULTRASOUND IMAGING | Facility: HOSPITAL | Age: 34
Discharge: HOME | End: 2019-02-22
Admitting: OBSTETRICS & GYNECOLOGY

## 2019-02-22 ENCOUNTER — ROUTINE PRENATAL (OUTPATIENT)
Dept: OBSTETRICS AND GYNECOLOGY | Age: 34
End: 2019-02-22

## 2019-02-22 ENCOUNTER — HOSPITAL ENCOUNTER (INPATIENT)
Facility: HOSPITAL | Age: 34
LOS: 8 days | Discharge: HOME | End: 2019-03-02
Attending: OBSTETRICS & GYNECOLOGY | Admitting: OBSTETRICS & GYNECOLOGY

## 2019-02-22 VITALS — WEIGHT: 195 LBS | DIASTOLIC BLOOD PRESSURE: 80 MMHG | SYSTOLIC BLOOD PRESSURE: 118 MMHG | BODY MASS INDEX: 30.54 KG/M2

## 2019-02-22 DIAGNOSIS — Z3A.33 33 WEEKS GESTATION OF PREGNANCY: Primary | ICD-10-CM

## 2019-02-22 DIAGNOSIS — K83.1 CHOLESTASIS DURING PREGNANCY, ANTEPARTUM: ICD-10-CM

## 2019-02-22 DIAGNOSIS — O26.619 CHOLESTASIS DURING PREGNANCY, ANTEPARTUM: ICD-10-CM

## 2019-02-22 DIAGNOSIS — O12.10 PROTEINURIA AFFECTING PREGNANCY, ANTEPARTUM: ICD-10-CM

## 2019-02-22 PROBLEM — O14.90 PRE-ECLAMPSIA: Status: ACTIVE | Noted: 2019-02-22

## 2019-02-22 PROBLEM — O35.EXX0 FETAL RENAL ANOMALY, SINGLE GESTATION: Status: ACTIVE | Noted: 2019-02-22

## 2019-02-22 PROBLEM — N28.89 DILATED RENAL PELVIS: Status: RESOLVED | Noted: 2018-11-30 | Resolved: 2019-02-22

## 2019-02-22 LAB
ALBUMIN SERPL-MCNC: 2.9 G/DL (ref 3.5–5.2)
ALBUMIN/GLOB SERPL: 1.1 G/DL
ALP SERPL-CCNC: 123 U/L (ref 39–117)
ALT SERPL W P-5'-P-CCNC: 17 U/L (ref 1–33)
ANION GAP SERPL CALCULATED.3IONS-SCNC: 12.9 MMOL/L
AST SERPL-CCNC: 18 U/L (ref 1–32)
BILIRUB SERPL-MCNC: 0.2 MG/DL (ref 0.1–1.2)
BUN BLD-MCNC: 13 MG/DL (ref 6–20)
BUN/CREAT SERPL: 18.8 (ref 7–25)
CALCIUM SPEC-SCNC: 8.9 MG/DL (ref 8.6–10.5)
CHLORIDE SERPL-SCNC: 109 MMOL/L (ref 98–107)
CO2 SERPL-SCNC: 19.1 MMOL/L (ref 22–29)
CREAT BLD-MCNC: 0.69 MG/DL (ref 0.57–1)
DEPRECATED RDW RBC AUTO: 42.8 FL (ref 37–54)
ERYTHROCYTE [DISTWIDTH] IN BLOOD BY AUTOMATED COUNT: 13.1 % (ref 12.3–15.4)
GFR SERPL CREATININE-BSD FRML MDRD: 99 ML/MIN/1.73
GLOBULIN UR ELPH-MCNC: 2.6 GM/DL
GLUCOSE BLD-MCNC: 94 MG/DL (ref 65–99)
HCT VFR BLD AUTO: 38.2 % (ref 34–46.6)
HGB BLD-MCNC: 12.8 G/DL (ref 12–15.9)
MCH RBC QN AUTO: 30.3 PG (ref 26.6–33)
MCHC RBC AUTO-ENTMCNC: 33.5 G/DL (ref 31.5–35.7)
MCV RBC AUTO: 90.5 FL (ref 79–97)
PLATELET # BLD AUTO: 176 10*3/MM3 (ref 140–450)
PMV BLD AUTO: 12.8 FL (ref 6–12)
POTASSIUM BLD-SCNC: 4.3 MMOL/L (ref 3.5–5.2)
PROT SERPL-MCNC: 5.5 G/DL (ref 6–8.5)
RBC # BLD AUTO: 4.22 10*6/MM3 (ref 3.77–5.28)
SODIUM BLD-SCNC: 141 MMOL/L (ref 136–145)
WBC NRBC COR # BLD: 11.03 10*3/MM3 (ref 3.4–10.8)

## 2019-02-22 PROCEDURE — 59025 FETAL NON-STRESS TEST: CPT

## 2019-02-22 PROCEDURE — 76819 FETAL BIOPHYS PROFIL W/O NST: CPT

## 2019-02-22 PROCEDURE — 0502F SUBSEQUENT PRENATAL CARE: CPT | Performed by: OBSTETRICS & GYNECOLOGY

## 2019-02-22 PROCEDURE — 80053 COMPREHEN METABOLIC PANEL: CPT | Performed by: OBSTETRICS & GYNECOLOGY

## 2019-02-22 PROCEDURE — 85027 COMPLETE CBC AUTOMATED: CPT | Performed by: OBSTETRICS & GYNECOLOGY

## 2019-02-22 PROCEDURE — 76805 OB US >/= 14 WKS SNGL FETUS: CPT

## 2019-02-22 PROCEDURE — 59025 FETAL NON-STRESS TEST: CPT | Performed by: OBSTETRICS & GYNECOLOGY

## 2019-02-22 PROCEDURE — 99221 1ST HOSP IP/OBS SF/LOW 40: CPT | Performed by: OBSTETRICS & GYNECOLOGY

## 2019-02-22 PROCEDURE — 25010000002 BETAMETHASONE ACET & SOD PHOS PER 4 MG: Performed by: OBSTETRICS & GYNECOLOGY

## 2019-02-22 RX ORDER — FAMOTIDINE 20 MG/1
20 TABLET, FILM COATED ORAL 2 TIMES DAILY PRN
Status: DISCONTINUED | OUTPATIENT
Start: 2019-02-22 | End: 2019-03-02 | Stop reason: HOSPADM

## 2019-02-22 RX ORDER — LABETALOL 200 MG/1
200 TABLET, FILM COATED ORAL EVERY 12 HOURS SCHEDULED
Status: DISCONTINUED | OUTPATIENT
Start: 2019-02-22 | End: 2019-03-02 | Stop reason: HOSPADM

## 2019-02-22 RX ORDER — URSODIOL 300 MG/1
300 CAPSULE ORAL 3 TIMES DAILY
Status: DISCONTINUED | OUTPATIENT
Start: 2019-02-22 | End: 2019-02-27

## 2019-02-22 RX ORDER — DOCUSATE SODIUM 100 MG/1
100 CAPSULE, LIQUID FILLED ORAL 2 TIMES DAILY PRN
Status: DISCONTINUED | OUTPATIENT
Start: 2019-02-22 | End: 2019-03-02 | Stop reason: HOSPADM

## 2019-02-22 RX ORDER — SODIUM CHLORIDE 0.9 % (FLUSH) 0.9 %
3 SYRINGE (ML) INJECTION EVERY 12 HOURS SCHEDULED
Status: DISCONTINUED | OUTPATIENT
Start: 2019-02-22 | End: 2019-02-24

## 2019-02-22 RX ORDER — DIPHENHYDRAMINE HCL 25 MG
25 CAPSULE ORAL EVERY 6 HOURS PRN
Status: DISCONTINUED | OUTPATIENT
Start: 2019-02-22 | End: 2019-02-26

## 2019-02-22 RX ORDER — SODIUM CHLORIDE 0.9 % (FLUSH) 0.9 %
3-10 SYRINGE (ML) INJECTION AS NEEDED
Status: DISCONTINUED | OUTPATIENT
Start: 2019-02-22 | End: 2019-02-28 | Stop reason: HOSPADM

## 2019-02-22 RX ORDER — ASPIRIN 81 MG/1
81 TABLET ORAL DAILY
Status: DISCONTINUED | OUTPATIENT
Start: 2019-02-22 | End: 2019-02-26

## 2019-02-22 RX ORDER — ZOLPIDEM TARTRATE 5 MG/1
5 TABLET ORAL NIGHTLY PRN
Status: DISCONTINUED | OUTPATIENT
Start: 2019-02-22 | End: 2019-02-26

## 2019-02-22 RX ORDER — LIDOCAINE HYDROCHLORIDE 10 MG/ML
5 INJECTION, SOLUTION EPIDURAL; INFILTRATION; INTRACAUDAL; PERINEURAL AS NEEDED
Status: DISCONTINUED | OUTPATIENT
Start: 2019-02-22 | End: 2019-02-28 | Stop reason: HOSPADM

## 2019-02-22 RX ORDER — PRENATAL VIT NO.126/IRON/FOLIC 28MG-0.8MG
1 TABLET ORAL DAILY
Status: DISCONTINUED | OUTPATIENT
Start: 2019-02-22 | End: 2019-03-02 | Stop reason: HOSPADM

## 2019-02-22 RX ORDER — BETAMETHASONE SODIUM PHOSPHATE AND BETAMETHASONE ACETATE 3; 3 MG/ML; MG/ML
12 INJECTION, SUSPENSION INTRA-ARTICULAR; INTRALESIONAL; INTRAMUSCULAR; SOFT TISSUE EVERY 24 HOURS
Status: COMPLETED | OUTPATIENT
Start: 2019-02-22 | End: 2019-02-23

## 2019-02-22 RX ADMIN — URSODIOL 300 MG: 300 CAPSULE ORAL at 21:00

## 2019-02-22 RX ADMIN — MAGNESIUM OXIDE 400 MG: 400 TABLET ORAL at 22:00

## 2019-02-22 RX ADMIN — SODIUM CHLORIDE, PRESERVATIVE FREE 3 ML: 5 INJECTION INTRAVENOUS at 17:51

## 2019-02-22 RX ADMIN — Medication 1 TABLET: at 22:00

## 2019-02-22 RX ADMIN — ZOLPIDEM TARTRATE 5 MG: 5 TABLET ORAL at 23:07

## 2019-02-22 RX ADMIN — BETAMETHASONE ACETATE AND BETAMETHASONE SODIUM PHOSPHATE 12 MG: 3; 3 INJECTION, SUSPENSION INTRA-ARTICULAR; INTRALESIONAL; INTRAMUSCULAR; SOFT TISSUE at 19:02

## 2019-02-22 NOTE — PROGRESS NOTES
Pt in for f/u, had u/s with MFM and BPP today; she denies severe headache, vision changes or abdominal pain  She notes active fetal movement  O: ext: trace edema, DTR's 2+  A/p: 33 weeks with preeclampsia: MFM evaluated today and feels pt should be managed inpatient, reviewed his recommendations and pt agrees. Sent to [facility] for prolonged admission until delivery. MD on call and L&D staff notified    Cholestasis: bile acids increased but symptoms improved with Actigall    Absent right kidney: f/u u/s at [facility] done today and MFM unable to visualize right kidney well. Pt counseled and  u/s advised to assess further

## 2019-02-22 NOTE — CONSULTS
Ms. Armando is referred by Dr Stone for M consultation on indication of:   1) absent right fetal kidney on 2/16/19 US  2) Mild preeclampsia; labetalol   3) cholestasis symptoms resolved with Actigal    She is accompanied by her mother, Trisha, who was present throughout the ultrasound exam and consultation.    31 G1 at 33 0/7 per LESLY 4/12/19    Prenatal course is significant for:  Pruritis resolved after initiating Actigall.         PMH    Allergies   Allergen Reactions   • Ceclor [Cefaclor] Hives     And swelling- no anaphylaxis       Past Surgical History:   Procedure Laterality Date   • LASIK             Current Outpatient Medications:   •  aspirin 81 MG EC tablet, Take 81 mg by mouth Daily., Disp: , Rfl:   •  diphenhydrAMINE (BENADRYL) 25 mg capsule, Take 25 mg by mouth Every 6 (Six) Hours As Needed for Itching., Disp: , Rfl:   •  docusate sodium (COLACE) 100 MG capsule, Take 100 mg by mouth 2 (Two) Times a Day., Disp: , Rfl:   •  labetalol (NORMODYNE) 200 MG tablet, Take 1 tablet by mouth Every 12 (Twelve) Hours., Disp: 60 tablet, Rfl: 2  •  Multiple Vitamins-Minerals (MULTIVITAL) tablet, Take 1 tablet by mouth Daily., Disp: , Rfl:   •  Prenatal Vit-Fe Fumarate-FA (PRENATAL, CLASSIC, VITAMIN) 28-0.8 MG tablet tablet, Take  by mouth Daily., Disp: , Rfl:   •  ursodiol (ACTIGALL) 300 MG capsule, Take 1 capsule by mouth 3 (Three) Times a Day., Disp: 90 capsule, Rfl: 1      Family history is negative for mental retardation, trisomy 21, congenital heart disease, spina bifida, cystic fibrosis, muscular dystrophy, other birth defects, [place] ancestry.   The patient has a family history of: none of the above.    Social history  Smoking status:  no  Smokeless tobacco:   Alcohol use:  No  Street drug use:  Employment: Occupational therapist at [employer]    Urine protein/creatinine 0.33 on 2/15, 0.1 on 1/28/19  24 hour T protein 636  Bile acids 26.7 on 2/19, 14.4 on 2/12 (4.7-24.5)    2/19 AST 18 AST 17   TSH  1.07  MSAFP 1:8,371 ONTD  cfDNA neg  Preconception Counsyl Foresight screen was negative for both pt and partner, per pt hx.    See US report  Normal fetal growth  Anatomy including basic exam of heart did not detect abnormality within the limitations imposed by poor acoustic windows.          Impression:  33 0/7 per LESLY 4/12/19  1) absent right fetal kidney on 2/16/19 US  2) Preeclampsia; mild range BP on labetalol 200 q 12.  Per pt hx BP was >160/100 at initial L&D assessment  3) cholestasis symptoms resolved with Actigal  Uterine leiomyomata   Mild renal pelvic dilation left kidney   IVF pregnancy, which is associated with a approximately 50% increased incidence of congenital heart anomaly.        Recommendations:    Inpatient care (eg on antepartum) is recommended as safest course of management (both for mother and for fetus)  Requirements for outpt mgmt of mild preeclampsia include BP < 140/90 (not on antihypertensive medication). Note: Based on labetalol 200 BID, pt does not meet criteria for outpt observation.  Labetalol may mask hypertension which otherwise might be severe.     Hep C test if not previously done     Twice daily self BP monitoring while at home; promptly go to L&D if BP 160s/100s  Weekly bile acids, LFTs    If inpt care accepted by pt:  Twice daily EFM minimum 30 minutes and continuous prn contractions or clinically significant decelerations  Recommend BPPs be performed when MFM physician is present in [place] to see pt.       Delivery timing recommendations:  1) per mild preeclampsia at least by 37 weeks pending indication for earlier delivery, eg abnormal LFTs, bile acids > 40.      2) If bile acids > 40, recommend delivery upon achieving 36 completed weeks gestation     3) If persistent (ie repeat after 10-20 minutes) severe range BPs which require acute antihypertensive medication (eg oral nifedapine 10 mg immediate release) then recommend prompt delivery.     4) If not previously  administered, BMZ 12.5 mg x 2 doses 24 hours apart.     Notify pediatric/ care provider of hx IVF pregnancy    DVT prophylaxis: SCDs while in bed.  If c-s delivery is required, POD 1 begin enoxaparin 60mg subq daily.     Keep prenatal appointment with Dr. Stone this afternoon.    Above reviewed with Dr. Stone.  Dr. Stone stated she would call pt to recommend hospital admission.       For billing purposes, duration of face to face consultation was approximately 45 minutes of which > 50% was devoted to patient counseling and coordination of care, exclusive of US exam.

## 2019-02-23 LAB
COLLECT DURATION TIME UR: 24 HRS
COLLECT DURATION TIME UR: 24 HRS
CREAT UR-MCNC: 103.8 MG/DL
CREATINE 24H UR-MRATE: 1.71 G/24 HR (ref 0.7–1.6)
PROT 24H UR-MRATE: 5874 MG/24HOURS (ref 0–150)
PROT UR-MCNC: 356 MG/DL
SPECIMEN VOL 24H UR: 1650 ML
SPECIMEN VOL 24H UR: 1650 ML

## 2019-02-23 PROCEDURE — 59025 FETAL NON-STRESS TEST: CPT

## 2019-02-23 PROCEDURE — 84156 ASSAY OF PROTEIN URINE: CPT | Performed by: OBSTETRICS & GYNECOLOGY

## 2019-02-23 PROCEDURE — 82570 ASSAY OF URINE CREATININE: CPT | Performed by: OBSTETRICS & GYNECOLOGY

## 2019-02-23 PROCEDURE — 59025 FETAL NON-STRESS TEST: CPT | Performed by: OBSTETRICS & GYNECOLOGY

## 2019-02-23 PROCEDURE — 99232 SBSQ HOSP IP/OBS MODERATE 35: CPT | Performed by: OBSTETRICS & GYNECOLOGY

## 2019-02-23 PROCEDURE — 81050 URINALYSIS VOLUME MEASURE: CPT | Performed by: OBSTETRICS & GYNECOLOGY

## 2019-02-23 PROCEDURE — 25010000002 BETAMETHASONE ACET & SOD PHOS PER 4 MG: Performed by: OBSTETRICS & GYNECOLOGY

## 2019-02-23 RX ADMIN — LABETALOL HYDROCHLORIDE 200 MG: 200 TABLET, FILM COATED ORAL at 08:51

## 2019-02-23 RX ADMIN — Medication 400 MG: at 16:43

## 2019-02-23 RX ADMIN — LABETALOL HYDROCHLORIDE 200 MG: 200 TABLET, FILM COATED ORAL at 20:53

## 2019-02-23 RX ADMIN — URSODIOL 300 MG: 300 CAPSULE ORAL at 16:43

## 2019-02-23 RX ADMIN — URSODIOL 300 MG: 300 CAPSULE ORAL at 20:53

## 2019-02-23 RX ADMIN — ASPIRIN 81 MG: 81 TABLET, DELAYED RELEASE ORAL at 08:51

## 2019-02-23 RX ADMIN — Medication 1 TABLET: at 08:51

## 2019-02-23 RX ADMIN — BETAMETHASONE ACETATE AND BETAMETHASONE SODIUM PHOSPHATE 12 MG: 3; 3 INJECTION, SUSPENSION INTRA-ARTICULAR; INTRALESIONAL; INTRAMUSCULAR; SOFT TISSUE at 20:12

## 2019-02-23 RX ADMIN — URSODIOL 300 MG: 300 CAPSULE ORAL at 08:51

## 2019-02-23 RX ADMIN — ZOLPIDEM TARTRATE 5 MG: 5 TABLET ORAL at 22:51

## 2019-02-24 PROCEDURE — 25010000002 HEPARIN (PORCINE) PER 1000 UNITS: Performed by: OBSTETRICS & GYNECOLOGY

## 2019-02-24 PROCEDURE — 99232 SBSQ HOSP IP/OBS MODERATE 35: CPT | Performed by: OBSTETRICS & GYNECOLOGY

## 2019-02-24 PROCEDURE — 59025 FETAL NON-STRESS TEST: CPT

## 2019-02-24 PROCEDURE — 59025 FETAL NON-STRESS TEST: CPT | Performed by: OBSTETRICS & GYNECOLOGY

## 2019-02-24 RX ORDER — HEPARIN SODIUM 1000 [USP'U]/ML
7500 INJECTION, SOLUTION INTRAVENOUS; SUBCUTANEOUS ONCE
Status: DISCONTINUED | OUTPATIENT
Start: 2019-02-24 | End: 2019-02-24

## 2019-02-24 RX ORDER — HEPARIN SODIUM 5000 [USP'U]/ML
7500 INJECTION, SOLUTION INTRAVENOUS; SUBCUTANEOUS ONCE
Status: COMPLETED | OUTPATIENT
Start: 2019-02-24 | End: 2019-02-24

## 2019-02-24 RX ORDER — HEPARIN SODIUM 5000 [USP'U]/ML
7500 INJECTION, SOLUTION INTRAVENOUS; SUBCUTANEOUS EVERY 12 HOURS
Status: DISCONTINUED | OUTPATIENT
Start: 2019-02-25 | End: 2019-02-26

## 2019-02-24 RX ORDER — HEPARIN SODIUM 1000 [USP'U]/ML
7500 INJECTION, SOLUTION INTRAVENOUS; SUBCUTANEOUS EVERY 12 HOURS
Status: DISCONTINUED | OUTPATIENT
Start: 2019-02-25 | End: 2019-02-24

## 2019-02-24 RX ADMIN — URSODIOL 300 MG: 300 CAPSULE ORAL at 20:38

## 2019-02-24 RX ADMIN — HEPARIN SODIUM 7500 UNITS: 5000 INJECTION INTRAVENOUS; SUBCUTANEOUS at 13:53

## 2019-02-24 RX ADMIN — LABETALOL HYDROCHLORIDE 200 MG: 200 TABLET, FILM COATED ORAL at 20:38

## 2019-02-24 RX ADMIN — Medication 400 MG: at 17:07

## 2019-02-24 RX ADMIN — Medication 1 TABLET: at 09:23

## 2019-02-24 RX ADMIN — ASPIRIN 81 MG: 81 TABLET, DELAYED RELEASE ORAL at 09:23

## 2019-02-24 RX ADMIN — URSODIOL 300 MG: 300 CAPSULE ORAL at 17:07

## 2019-02-24 RX ADMIN — HEPARIN SODIUM 7500 UNITS: 5000 INJECTION INTRAVENOUS; SUBCUTANEOUS at 22:58

## 2019-02-24 RX ADMIN — LABETALOL HYDROCHLORIDE 200 MG: 200 TABLET, FILM COATED ORAL at 09:23

## 2019-02-24 RX ADMIN — URSODIOL 300 MG: 300 CAPSULE ORAL at 09:23

## 2019-02-25 LAB
ALBUMIN SERPL-MCNC: 2.5 G/DL (ref 3.5–5.2)
ALBUMIN/GLOB SERPL: 1 G/DL
ALP SERPL-CCNC: 113 U/L (ref 39–117)
ALT SERPL W P-5'-P-CCNC: 26 U/L (ref 1–33)
ANION GAP SERPL CALCULATED.3IONS-SCNC: 11.9 MMOL/L
AST SERPL-CCNC: 18 U/L (ref 1–32)
BASOPHILS # BLD AUTO: 0.05 10*3/MM3 (ref 0–0.2)
BASOPHILS NFR BLD AUTO: 0.4 % (ref 0–1.5)
BILIRUB SERPL-MCNC: <0.2 MG/DL (ref 0.1–1.2)
BUN BLD-MCNC: 13 MG/DL (ref 6–20)
BUN/CREAT SERPL: 20 (ref 7–25)
CALCIUM SPEC-SCNC: 8.7 MG/DL (ref 8.6–10.5)
CHLORIDE SERPL-SCNC: 110 MMOL/L (ref 98–107)
CO2 SERPL-SCNC: 20.1 MMOL/L (ref 22–29)
CREAT BLD-MCNC: 0.65 MG/DL (ref 0.57–1)
DEPRECATED RDW RBC AUTO: 45.4 FL (ref 37–54)
EOSINOPHIL # BLD AUTO: 0.02 10*3/MM3 (ref 0–0.4)
EOSINOPHIL NFR BLD AUTO: 0.2 % (ref 0.3–6.2)
ERYTHROCYTE [DISTWIDTH] IN BLOOD BY AUTOMATED COUNT: 13.3 % (ref 12.3–15.4)
GFR SERPL CREATININE-BSD FRML MDRD: 106 ML/MIN/1.73
GLOBULIN UR ELPH-MCNC: 2.6 GM/DL
GLUCOSE BLD-MCNC: 88 MG/DL (ref 65–99)
HCT VFR BLD AUTO: 36.5 % (ref 34–46.6)
HGB BLD-MCNC: 12 G/DL (ref 12–15.9)
IMM GRANULOCYTES # BLD AUTO: 0.36 10*3/MM3 (ref 0–0.05)
IMM GRANULOCYTES NFR BLD AUTO: 2.7 % (ref 0–0.5)
LYMPHOCYTES # BLD AUTO: 3.47 10*3/MM3 (ref 0.7–3.1)
LYMPHOCYTES NFR BLD AUTO: 26.3 % (ref 19.6–45.3)
MCH RBC QN AUTO: 30.3 PG (ref 26.6–33)
MCHC RBC AUTO-ENTMCNC: 32.9 G/DL (ref 31.5–35.7)
MCV RBC AUTO: 92.2 FL (ref 79–97)
MONOCYTES # BLD AUTO: 1.28 10*3/MM3 (ref 0.1–0.9)
MONOCYTES NFR BLD AUTO: 9.7 % (ref 5–12)
NEUTROPHILS # BLD AUTO: 8.02 10*3/MM3 (ref 1.4–7)
NEUTROPHILS NFR BLD AUTO: 60.7 % (ref 42.7–76)
NRBC BLD AUTO-RTO: 0.1 /100 WBC (ref 0–0)
PLATELET # BLD AUTO: 173 10*3/MM3 (ref 140–450)
PMV BLD AUTO: 12.8 FL (ref 6–12)
POTASSIUM BLD-SCNC: 4.2 MMOL/L (ref 3.5–5.2)
PROT SERPL-MCNC: 5.1 G/DL (ref 6–8.5)
RBC # BLD AUTO: 3.96 10*6/MM3 (ref 3.77–5.28)
SODIUM BLD-SCNC: 142 MMOL/L (ref 136–145)
WBC NRBC COR # BLD: 13.2 10*3/MM3 (ref 3.4–10.8)

## 2019-02-25 PROCEDURE — 25010000002 HEPARIN (PORCINE) PER 1000 UNITS: Performed by: OBSTETRICS & GYNECOLOGY

## 2019-02-25 PROCEDURE — 82239 BILE ACIDS TOTAL: CPT | Performed by: OBSTETRICS & GYNECOLOGY

## 2019-02-25 PROCEDURE — 80053 COMPREHEN METABOLIC PANEL: CPT | Performed by: OBSTETRICS & GYNECOLOGY

## 2019-02-25 PROCEDURE — 59025 FETAL NON-STRESS TEST: CPT | Performed by: OBSTETRICS & GYNECOLOGY

## 2019-02-25 PROCEDURE — 99232 SBSQ HOSP IP/OBS MODERATE 35: CPT | Performed by: OBSTETRICS & GYNECOLOGY

## 2019-02-25 PROCEDURE — 85025 COMPLETE CBC W/AUTO DIFF WBC: CPT | Performed by: OBSTETRICS & GYNECOLOGY

## 2019-02-25 PROCEDURE — 59025 FETAL NON-STRESS TEST: CPT

## 2019-02-25 RX ADMIN — Medication 400 MG: at 16:54

## 2019-02-25 RX ADMIN — URSODIOL 300 MG: 300 CAPSULE ORAL at 20:46

## 2019-02-25 RX ADMIN — Medication 1 TABLET: at 09:52

## 2019-02-25 RX ADMIN — HEPARIN SODIUM 7500 UNITS: 5000 INJECTION INTRAVENOUS; SUBCUTANEOUS at 09:47

## 2019-02-25 RX ADMIN — HEPARIN SODIUM 7500 UNITS: 5000 INJECTION INTRAVENOUS; SUBCUTANEOUS at 20:46

## 2019-02-25 RX ADMIN — ASPIRIN 81 MG: 81 TABLET, DELAYED RELEASE ORAL at 09:47

## 2019-02-25 RX ADMIN — ZOLPIDEM TARTRATE 5 MG: 5 TABLET ORAL at 22:29

## 2019-02-25 RX ADMIN — URSODIOL 300 MG: 300 CAPSULE ORAL at 09:47

## 2019-02-25 RX ADMIN — DOCUSATE SODIUM 100 MG: 100 CAPSULE, LIQUID FILLED ORAL at 09:52

## 2019-02-25 RX ADMIN — LABETALOL HYDROCHLORIDE 200 MG: 200 TABLET, FILM COATED ORAL at 09:47

## 2019-02-25 RX ADMIN — FAMOTIDINE 20 MG: 20 TABLET, FILM COATED ORAL at 02:28

## 2019-02-25 RX ADMIN — LABETALOL HYDROCHLORIDE 200 MG: 200 TABLET, FILM COATED ORAL at 20:46

## 2019-02-25 RX ADMIN — URSODIOL 300 MG: 300 CAPSULE ORAL at 16:54

## 2019-02-25 RX ADMIN — FAMOTIDINE 20 MG: 20 TABLET, FILM COATED ORAL at 22:29

## 2019-02-26 ENCOUNTER — ANESTHESIA EVENT (OUTPATIENT)
Dept: LABOR AND DELIVERY | Facility: HOSPITAL | Age: 34
End: 2019-02-26

## 2019-02-26 ENCOUNTER — APPOINTMENT (OUTPATIENT)
Dept: ULTRASOUND IMAGING | Facility: HOSPITAL | Age: 34
End: 2019-02-26

## 2019-02-26 ENCOUNTER — ANESTHESIA (OUTPATIENT)
Dept: LABOR AND DELIVERY | Facility: HOSPITAL | Age: 34
End: 2019-02-26

## 2019-02-26 LAB
ABO GROUP BLD: NORMAL
ALBUMIN SERPL-MCNC: 2.5 G/DL (ref 3.5–5.2)
ALBUMIN/GLOB SERPL: 0.9 G/DL
ALP SERPL-CCNC: 131 U/L (ref 39–117)
ALT SERPL W P-5'-P-CCNC: 42 U/L (ref 1–33)
ANION GAP SERPL CALCULATED.3IONS-SCNC: 5.7 MMOL/L
AST SERPL-CCNC: 32 U/L (ref 1–32)
ATMOSPHERIC PRESS: 755.5 MMHG
ATMOSPHERIC PRESS: 758.6 MMHG
BASE EXCESS BLDCOV CALC-SCNC: -0.1 MMOL/L (ref -30–30)
BASE EXCESS BLDCOV CALC-SCNC: 0.4 MMOL/L (ref -30–30)
BASOPHILS # BLD AUTO: 0.1 10*3/MM3 (ref 0–0.2)
BASOPHILS NFR BLD AUTO: 0.7 % (ref 0–1.5)
BDY SITE: ABNORMAL
BDY SITE: ABNORMAL
BILIRUB SERPL-MCNC: <0.2 MG/DL (ref 0.1–1.2)
BLD GP AB SCN SERPL QL: POSITIVE
BUN BLD-MCNC: 13 MG/DL (ref 6–20)
BUN/CREAT SERPL: 17.8 (ref 7–25)
CALCIUM SPEC-SCNC: 8.8 MG/DL (ref 8.6–10.5)
CHLORIDE SERPL-SCNC: 110 MMOL/L (ref 98–107)
CO2 SERPL-SCNC: 21.3 MMOL/L (ref 22–29)
CREAT BLD-MCNC: 0.73 MG/DL (ref 0.57–1)
DEPRECATED RDW RBC AUTO: 43.8 FL (ref 37–54)
EOSINOPHIL # BLD AUTO: 0.11 10*3/MM3 (ref 0–0.4)
EOSINOPHIL NFR BLD AUTO: 0.8 % (ref 0.3–6.2)
ERYTHROCYTE [DISTWIDTH] IN BLOOD BY AUTOMATED COUNT: 13 % (ref 12.3–15.4)
GAS FLOW AIRWAY: 2 LPM
GAS FLOW AIRWAY: 2 LPM
GFR SERPL CREATININE-BSD FRML MDRD: 93 ML/MIN/1.73
GLOBULIN UR ELPH-MCNC: 2.7 GM/DL
GLUCOSE BLD-MCNC: 95 MG/DL (ref 65–99)
HCO3 BLDCOV-SCNC: 27.4 MMOL/L
HCO3 BLDCOV-SCNC: 28.1 MMOL/L
HCT VFR BLD AUTO: 39.9 % (ref 34–46.6)
HGB BLD-MCNC: 13.3 G/DL (ref 12–15.9)
IMM GRANULOCYTES # BLD AUTO: 0.41 10*3/MM3 (ref 0–0.05)
IMM GRANULOCYTES NFR BLD AUTO: 3 % (ref 0–0.5)
LYMPHOCYTES # BLD AUTO: 3.68 10*3/MM3 (ref 0.7–3.1)
LYMPHOCYTES NFR BLD AUTO: 26.7 % (ref 19.6–45.3)
MCH RBC QN AUTO: 30.7 PG (ref 26.6–33)
MCHC RBC AUTO-ENTMCNC: 33.3 G/DL (ref 31.5–35.7)
MCV RBC AUTO: 92.1 FL (ref 79–97)
MODALITY: ABNORMAL
MODALITY: ABNORMAL
MONOCYTES # BLD AUTO: 1.61 10*3/MM3 (ref 0.1–0.9)
MONOCYTES NFR BLD AUTO: 11.7 % (ref 5–12)
NEUTROPHILS # BLD AUTO: 7.86 10*3/MM3 (ref 1.4–7)
NEUTROPHILS NFR BLD AUTO: 57.1 % (ref 42.7–76)
NOTE: ABNORMAL
NOTE: ABNORMAL
NRBC BLD AUTO-RTO: 0.3 /100 WBC (ref 0–0)
PCO2 BLDCOV: 55.5 MM HG (ref 35–51.3)
PCO2 BLDCOV: 57.5 MM HG (ref 35–51.3)
PH BLDCOV: 7.3 PH UNITS (ref 7.26–7.4)
PH BLDCOV: 7.3 PH UNITS (ref 7.26–7.4)
PLATELET # BLD AUTO: 171 10*3/MM3 (ref 140–450)
PMV BLD AUTO: 12.8 FL (ref 6–12)
PO2 BLDCOV: 15.6 MM HG (ref 19–39)
PO2 BLDCOV: 16.8 MM HG (ref 19–39)
POTASSIUM BLD-SCNC: 4.1 MMOL/L (ref 3.5–5.2)
PROT SERPL-MCNC: 5.2 G/DL (ref 6–8.5)
RBC # BLD AUTO: 4.33 10*6/MM3 (ref 3.77–5.28)
RESIDUAL RHIG DETECTED: NORMAL
RH BLD: NEGATIVE
SAO2 % BLDCOA: 16.8 % (ref 92–99)
SAO2 % BLDCOA: 19.3 % (ref 92–99)
SAO2 % BLDCOV: ABNORMAL %
SAO2 % BLDCOV: ABNORMAL %
SODIUM BLD-SCNC: 137 MMOL/L (ref 136–145)
T&S EXPIRATION DATE: NORMAL
WBC NRBC COR # BLD: 13.77 10*3/MM3 (ref 3.4–10.8)

## 2019-02-26 PROCEDURE — 25010000002 GENTAMICIN PER 80 MG: Performed by: OBSTETRICS & GYNECOLOGY

## 2019-02-26 PROCEDURE — 88307 TISSUE EXAM BY PATHOLOGIST: CPT

## 2019-02-26 PROCEDURE — 86850 RBC ANTIBODY SCREEN: CPT | Performed by: OBSTETRICS & GYNECOLOGY

## 2019-02-26 PROCEDURE — 25010000002 HEPARIN (PORCINE) PER 1000 UNITS: Performed by: OBSTETRICS & GYNECOLOGY

## 2019-02-26 PROCEDURE — 76815 OB US LIMITED FETUS(S): CPT

## 2019-02-26 PROCEDURE — 25010000002 MORPHINE PER 10 MG: Performed by: ANESTHESIOLOGY

## 2019-02-26 PROCEDURE — 99232 SBSQ HOSP IP/OBS MODERATE 35: CPT | Performed by: OBSTETRICS & GYNECOLOGY

## 2019-02-26 PROCEDURE — 86900 BLOOD TYPING SEROLOGIC ABO: CPT | Performed by: OBSTETRICS & GYNECOLOGY

## 2019-02-26 PROCEDURE — 86870 RBC ANTIBODY IDENTIFICATION: CPT | Performed by: OBSTETRICS & GYNECOLOGY

## 2019-02-26 PROCEDURE — 82803 BLOOD GASES ANY COMBINATION: CPT

## 2019-02-26 PROCEDURE — 87081 CULTURE SCREEN ONLY: CPT | Performed by: OBSTETRICS & GYNECOLOGY

## 2019-02-26 PROCEDURE — 59025 FETAL NON-STRESS TEST: CPT

## 2019-02-26 PROCEDURE — 59025 FETAL NON-STRESS TEST: CPT | Performed by: OBSTETRICS & GYNECOLOGY

## 2019-02-26 PROCEDURE — 76819 FETAL BIOPHYS PROFIL W/O NST: CPT

## 2019-02-26 PROCEDURE — 86901 BLOOD TYPING SEROLOGIC RH(D): CPT | Performed by: OBSTETRICS & GYNECOLOGY

## 2019-02-26 PROCEDURE — 59510 CESAREAN DELIVERY: CPT | Performed by: OBSTETRICS & GYNECOLOGY

## 2019-02-26 PROCEDURE — 85025 COMPLETE CBC W/AUTO DIFF WBC: CPT | Performed by: OBSTETRICS & GYNECOLOGY

## 2019-02-26 PROCEDURE — 80053 COMPREHEN METABOLIC PANEL: CPT | Performed by: OBSTETRICS & GYNECOLOGY

## 2019-02-26 RX ORDER — LABETALOL 200 MG/1
200 TABLET, FILM COATED ORAL EVERY 12 HOURS SCHEDULED
Status: CANCELLED | OUTPATIENT
Start: 2019-02-27

## 2019-02-26 RX ORDER — OXYTOCIN-SODIUM CHLORIDE 0.9% IV SOLN 30 UNIT/500ML 30-0.9/5 UT/ML-%
125 SOLUTION INTRAVENOUS CONTINUOUS PRN
Status: COMPLETED | OUTPATIENT
Start: 2019-02-26 | End: 2019-02-27

## 2019-02-26 RX ORDER — OXYTOCIN-SODIUM CHLORIDE 0.9% IV SOLN 30 UNIT/500ML 30-0.9/5 UT/ML-%
250 SOLUTION INTRAVENOUS CONTINUOUS
Status: DISPENSED | OUTPATIENT
Start: 2019-02-26 | End: 2019-02-26

## 2019-02-26 RX ORDER — SIMETHICONE 80 MG
80 TABLET,CHEWABLE ORAL
Status: DISCONTINUED | OUTPATIENT
Start: 2019-02-27 | End: 2019-03-02 | Stop reason: HOSPADM

## 2019-02-26 RX ORDER — NALOXONE HCL 0.4 MG/ML
0.4 VIAL (ML) INJECTION
Status: DISCONTINUED | OUTPATIENT
Start: 2019-02-26 | End: 2019-03-02 | Stop reason: HOSPADM

## 2019-02-26 RX ORDER — BISACODYL 10 MG
10 SUPPOSITORY, RECTAL RECTAL DAILY PRN
Status: DISCONTINUED | OUTPATIENT
Start: 2019-02-26 | End: 2019-03-02 | Stop reason: HOSPADM

## 2019-02-26 RX ORDER — BUPIVACAINE HYDROCHLORIDE 7.5 MG/ML
INJECTION, SOLUTION INTRASPINAL
Status: DISPENSED
Start: 2019-02-26 | End: 2019-02-27

## 2019-02-26 RX ORDER — BUPIVACAINE HYDROCHLORIDE 7.5 MG/ML
INJECTION, SOLUTION EPIDURAL; RETROBULBAR
Status: COMPLETED | OUTPATIENT
Start: 2019-02-26 | End: 2019-02-26

## 2019-02-26 RX ORDER — ATROPINE SULFATE 0.4 MG/ML
AMPUL (ML) INJECTION AS NEEDED
Status: DISCONTINUED | OUTPATIENT
Start: 2019-02-26 | End: 2019-02-26 | Stop reason: SURG

## 2019-02-26 RX ORDER — SODIUM CHLORIDE, SODIUM LACTATE, POTASSIUM CHLORIDE, CALCIUM CHLORIDE 600; 310; 30; 20 MG/100ML; MG/100ML; MG/100ML; MG/100ML
75 INJECTION, SOLUTION INTRAVENOUS CONTINUOUS
Status: DISCONTINUED | OUTPATIENT
Start: 2019-02-26 | End: 2019-03-02 | Stop reason: HOSPADM

## 2019-02-26 RX ORDER — MORPHINE SULFATE 1 MG/ML
INJECTION, SOLUTION EPIDURAL; INTRATHECAL; INTRAVENOUS
Status: COMPLETED | OUTPATIENT
Start: 2019-02-26 | End: 2019-02-26

## 2019-02-26 RX ORDER — MISOPROSTOL 200 UG/1
800 TABLET ORAL AS NEEDED
Status: CANCELLED | OUTPATIENT
Start: 2019-02-26

## 2019-02-26 RX ORDER — FAMOTIDINE 10 MG/ML
20 INJECTION, SOLUTION INTRAVENOUS ONCE AS NEEDED
Status: DISCONTINUED | OUTPATIENT
Start: 2019-02-26 | End: 2019-02-28 | Stop reason: HOSPADM

## 2019-02-26 RX ORDER — LABETALOL HYDROCHLORIDE 5 MG/ML
20-80 INJECTION, SOLUTION INTRAVENOUS
Status: CANCELLED | OUTPATIENT
Start: 2019-02-26 | End: 2019-02-27

## 2019-02-26 RX ORDER — LABETALOL HYDROCHLORIDE 5 MG/ML
20-80 INJECTION, SOLUTION INTRAVENOUS
Status: DISPENSED | OUTPATIENT
Start: 2019-02-26 | End: 2019-02-27

## 2019-02-26 RX ORDER — IBUPROFEN 600 MG/1
600 TABLET ORAL EVERY 6 HOURS PRN
Status: DISCONTINUED | OUTPATIENT
Start: 2019-02-26 | End: 2019-03-02 | Stop reason: HOSPADM

## 2019-02-26 RX ORDER — MORPHINE SULFATE 2 MG/ML
2 INJECTION, SOLUTION INTRAMUSCULAR; INTRAVENOUS
Status: CANCELLED | OUTPATIENT
Start: 2019-02-26 | End: 2019-02-27

## 2019-02-26 RX ORDER — PHYTONADIONE 1 MG/.5ML
INJECTION, EMULSION INTRAMUSCULAR; INTRAVENOUS; SUBCUTANEOUS
Status: DISPENSED
Start: 2019-02-26 | End: 2019-02-27

## 2019-02-26 RX ORDER — SODIUM CHLORIDE, SODIUM LACTATE, POTASSIUM CHLORIDE, CALCIUM CHLORIDE 600; 310; 30; 20 MG/100ML; MG/100ML; MG/100ML; MG/100ML
125 INJECTION, SOLUTION INTRAVENOUS CONTINUOUS
Status: DISCONTINUED | OUTPATIENT
Start: 2019-02-26 | End: 2019-02-26

## 2019-02-26 RX ORDER — MORPHINE SULFATE 1 MG/ML
INJECTION, SOLUTION EPIDURAL; INTRATHECAL; INTRAVENOUS AS NEEDED
Status: DISCONTINUED | OUTPATIENT
Start: 2019-02-26 | End: 2019-02-26 | Stop reason: SURG

## 2019-02-26 RX ORDER — CLINDAMYCIN PHOSPHATE 900 MG/50ML
900 INJECTION INTRAVENOUS ONCE
Status: COMPLETED | OUTPATIENT
Start: 2019-02-26 | End: 2019-02-26

## 2019-02-26 RX ORDER — ERYTHROMYCIN 5 MG/G
OINTMENT OPHTHALMIC
Status: DISPENSED
Start: 2019-02-26 | End: 2019-02-27

## 2019-02-26 RX ORDER — CARBOPROST TROMETHAMINE 250 UG/ML
250 INJECTION, SOLUTION INTRAMUSCULAR AS NEEDED
Status: CANCELLED | OUTPATIENT
Start: 2019-02-26

## 2019-02-26 RX ORDER — MORPHINE SULFATE 10 MG/ML
3 INJECTION INTRAMUSCULAR; INTRAVENOUS; SUBCUTANEOUS
Status: CANCELLED | OUTPATIENT
Start: 2019-02-26 | End: 2019-02-27

## 2019-02-26 RX ORDER — HYDROXYZINE 50 MG/1
50 TABLET, FILM COATED ORAL EVERY 6 HOURS PRN
Status: DISCONTINUED | OUTPATIENT
Start: 2019-02-26 | End: 2019-03-02 | Stop reason: HOSPADM

## 2019-02-26 RX ORDER — OXYCODONE HYDROCHLORIDE AND ACETAMINOPHEN 5; 325 MG/1; MG/1
1 TABLET ORAL EVERY 4 HOURS PRN
Status: DISCONTINUED | OUTPATIENT
Start: 2019-02-26 | End: 2019-03-02 | Stop reason: HOSPADM

## 2019-02-26 RX ORDER — MAGNESIUM SULFATE HEPTAHYDRATE 40 MG/ML
2 INJECTION, SOLUTION INTRAVENOUS CONTINUOUS
Status: DISPENSED | OUTPATIENT
Start: 2019-02-26 | End: 2019-03-02

## 2019-02-26 RX ORDER — DOCUSATE SODIUM 100 MG/1
100 CAPSULE, LIQUID FILLED ORAL 2 TIMES DAILY
Status: DISCONTINUED | OUTPATIENT
Start: 2019-02-27 | End: 2019-03-02 | Stop reason: HOSPADM

## 2019-02-26 RX ORDER — METHYLERGONOVINE MALEATE 0.2 MG/ML
200 INJECTION INTRAVENOUS ONCE AS NEEDED
Status: CANCELLED | OUTPATIENT
Start: 2019-02-26

## 2019-02-26 RX ORDER — MORPHINE SULFATE 2 MG/ML
2 INJECTION, SOLUTION INTRAMUSCULAR; INTRAVENOUS EVERY 4 HOURS PRN
Status: DISCONTINUED | OUTPATIENT
Start: 2019-02-26 | End: 2019-03-02 | Stop reason: HOSPADM

## 2019-02-26 RX ORDER — MORPHINE SULFATE 1 MG/ML
INJECTION, SOLUTION EPIDURAL; INTRATHECAL; INTRAVENOUS
Status: COMPLETED
Start: 2019-02-26 | End: 2019-02-26

## 2019-02-26 RX ORDER — ONDANSETRON 2 MG/ML
4 INJECTION INTRAMUSCULAR; INTRAVENOUS EVERY 6 HOURS PRN
Status: DISCONTINUED | OUTPATIENT
Start: 2019-02-26 | End: 2019-03-02 | Stop reason: HOSPADM

## 2019-02-26 RX ORDER — MORPHINE SULFATE 1 MG/ML
1 INJECTION, SOLUTION EPIDURAL; INTRATHECAL; INTRAVENOUS EVERY 4 HOURS PRN
Status: DISCONTINUED | OUTPATIENT
Start: 2019-02-26 | End: 2019-03-02 | Stop reason: HOSPADM

## 2019-02-26 RX ORDER — OXYTOCIN-SODIUM CHLORIDE 0.9% IV SOLN 30 UNIT/500ML 30-0.9/5 UT/ML-%
999 SOLUTION INTRAVENOUS ONCE
Status: DISCONTINUED | OUTPATIENT
Start: 2019-02-26 | End: 2019-02-28 | Stop reason: HOSPADM

## 2019-02-26 RX ORDER — LABETALOL HYDROCHLORIDE 5 MG/ML
20 INJECTION, SOLUTION INTRAVENOUS ONCE
Status: COMPLETED | OUTPATIENT
Start: 2019-02-26 | End: 2019-02-26

## 2019-02-26 RX ADMIN — OXYCODONE AND ACETAMINOPHEN 1 TABLET: 5; 325 TABLET ORAL at 23:59

## 2019-02-26 RX ADMIN — HEPARIN SODIUM 7500 UNITS: 5000 INJECTION INTRAVENOUS; SUBCUTANEOUS at 08:51

## 2019-02-26 RX ADMIN — Medication 400 MG: at 16:40

## 2019-02-26 RX ADMIN — ATROPINE SULFATE 0.4 MG: 0.4 INJECTION, SOLUTION INTRAMUSCULAR; INTRAVENOUS; SUBCUTANEOUS at 21:42

## 2019-02-26 RX ADMIN — LABETALOL HYDROCHLORIDE 20 MG: 5 INJECTION, SOLUTION INTRAVENOUS at 19:45

## 2019-02-26 RX ADMIN — MORPHINE SULFATE 200 MCG: 1 INJECTION EPIDURAL; INTRATHECAL; INTRAVENOUS at 21:37

## 2019-02-26 RX ADMIN — GENTAMICIN SULFATE 360 MG: 40 INJECTION, SOLUTION INTRAMUSCULAR; INTRAVENOUS at 21:16

## 2019-02-26 RX ADMIN — CLINDAMYCIN PHOSPHATE 900 MG: 900 INJECTION, SOLUTION INTRAVENOUS at 20:58

## 2019-02-26 RX ADMIN — SODIUM CHLORIDE, POTASSIUM CHLORIDE, SODIUM LACTATE AND CALCIUM CHLORIDE 75 ML/HR: 600; 310; 30; 20 INJECTION, SOLUTION INTRAVENOUS at 20:11

## 2019-02-26 RX ADMIN — LABETALOL HYDROCHLORIDE 40 MG: 5 INJECTION, SOLUTION INTRAVENOUS at 20:13

## 2019-02-26 RX ADMIN — Medication 1 TABLET: at 08:58

## 2019-02-26 RX ADMIN — URSODIOL 300 MG: 300 CAPSULE ORAL at 11:17

## 2019-02-26 RX ADMIN — MORPHINE SULFATE 3 MG: 1 INJECTION EPIDURAL; INTRATHECAL; INTRAVENOUS at 22:15

## 2019-02-26 RX ADMIN — URSODIOL 300 MG: 300 CAPSULE ORAL at 17:07

## 2019-02-26 RX ADMIN — MORPHINE SULFATE 3 MG: 1 INJECTION EPIDURAL; INTRATHECAL; INTRAVENOUS at 22:33

## 2019-02-26 RX ADMIN — BUPIVACAINE HYDROCHLORIDE 1.8 ML: 7.5 INJECTION, SOLUTION EPIDURAL; RETROBULBAR at 21:37

## 2019-02-26 RX ADMIN — FAMOTIDINE 20 MG: 20 TABLET, FILM COATED ORAL at 16:38

## 2019-02-26 RX ADMIN — OXYTOCIN 999 ML/HR: 10 INJECTION INTRAVENOUS at 21:53

## 2019-02-26 RX ADMIN — SODIUM CHLORIDE, POTASSIUM CHLORIDE, SODIUM LACTATE AND CALCIUM CHLORIDE: 600; 310; 30; 20 INJECTION, SOLUTION INTRAVENOUS at 21:25

## 2019-02-26 RX ADMIN — LABETALOL HYDROCHLORIDE 80 MG: 5 INJECTION, SOLUTION INTRAVENOUS at 20:29

## 2019-02-26 RX ADMIN — MORPHINE SULFATE 4 MG: 1 INJECTION EPIDURAL; INTRATHECAL; INTRAVENOUS at 22:27

## 2019-02-26 RX ADMIN — LABETALOL HYDROCHLORIDE 80 MG: 5 INJECTION, SOLUTION INTRAVENOUS at 20:48

## 2019-02-26 RX ADMIN — OXYTOCIN 125 ML/HR: 10 INJECTION INTRAVENOUS at 23:51

## 2019-02-26 RX ADMIN — LABETALOL HYDROCHLORIDE 200 MG: 200 TABLET, FILM COATED ORAL at 08:51

## 2019-02-26 RX ADMIN — ASPIRIN 81 MG: 81 TABLET, DELAYED RELEASE ORAL at 08:51

## 2019-02-27 LAB
ABO GROUP BLD: NORMAL
ALBUMIN SERPL-MCNC: 2.5 G/DL (ref 3.5–5.2)
ALBUMIN/GLOB SERPL: 1.2 G/DL
ALP SERPL-CCNC: 112 U/L (ref 39–117)
ALT SERPL W P-5'-P-CCNC: 103 U/L (ref 1–33)
ANION GAP SERPL CALCULATED.3IONS-SCNC: 8 MMOL/L
AST SERPL-CCNC: 73 U/L (ref 1–32)
BASOPHILS # BLD AUTO: 0.07 10*3/MM3 (ref 0–0.2)
BASOPHILS NFR BLD AUTO: 0.4 % (ref 0–1.5)
BILE AC SERPL-SCNC: 12.8 UMOL/L (ref 4.7–24.5)
BILIRUB SERPL-MCNC: 0.3 MG/DL (ref 0.1–1.2)
BUN BLD-MCNC: 15 MG/DL (ref 6–20)
BUN/CREAT SERPL: 21.7 (ref 7–25)
CALCIUM SPEC-SCNC: 7.7 MG/DL (ref 8.6–10.5)
CHLORIDE SERPL-SCNC: 105 MMOL/L (ref 98–107)
CO2 SERPL-SCNC: 20 MMOL/L (ref 22–29)
CREAT BLD-MCNC: 0.69 MG/DL (ref 0.57–1)
DEPRECATED RDW RBC AUTO: 45.4 FL (ref 37–54)
EOSINOPHIL # BLD AUTO: 0.11 10*3/MM3 (ref 0–0.4)
EOSINOPHIL NFR BLD AUTO: 0.7 % (ref 0.3–6.2)
ERYTHROCYTE [DISTWIDTH] IN BLOOD BY AUTOMATED COUNT: 13.5 % (ref 12.3–15.4)
GFR SERPL CREATININE-BSD FRML MDRD: 99 ML/MIN/1.73
GLOBULIN UR ELPH-MCNC: 2.1 GM/DL
GLUCOSE BLD-MCNC: 96 MG/DL (ref 65–99)
HCT VFR BLD AUTO: 37.1 % (ref 34–46.6)
HGB BLD-MCNC: 12.1 G/DL (ref 12–15.9)
IMM GRANULOCYTES # BLD AUTO: 0.27 10*3/MM3 (ref 0–0.05)
IMM GRANULOCYTES NFR BLD AUTO: 1.7 % (ref 0–0.5)
LYMPHOCYTES # BLD AUTO: 2.88 10*3/MM3 (ref 0.7–3.1)
LYMPHOCYTES NFR BLD AUTO: 18.3 % (ref 19.6–45.3)
MCH RBC QN AUTO: 30.3 PG (ref 26.6–33)
MCHC RBC AUTO-ENTMCNC: 32.6 G/DL (ref 31.5–35.7)
MCV RBC AUTO: 92.8 FL (ref 79–97)
MONOCYTES # BLD AUTO: 1.53 10*3/MM3 (ref 0.1–0.9)
MONOCYTES NFR BLD AUTO: 9.7 % (ref 5–12)
NEUTROPHILS # BLD AUTO: 10.91 10*3/MM3 (ref 1.4–7)
NEUTROPHILS NFR BLD AUTO: 69.2 % (ref 42.7–76)
NRBC BLD AUTO-RTO: 0.1 /100 WBC (ref 0–0)
PLATELET # BLD AUTO: 167 10*3/MM3 (ref 140–450)
PMV BLD AUTO: 12.2 FL (ref 6–12)
POTASSIUM BLD-SCNC: 4.8 MMOL/L (ref 3.5–5.2)
PROT SERPL-MCNC: 4.6 G/DL (ref 6–8.5)
RBC # BLD AUTO: 4 10*6/MM3 (ref 3.77–5.28)
RH BLD: NEGATIVE
SODIUM BLD-SCNC: 133 MMOL/L (ref 136–145)
WBC NRBC COR # BLD: 15.77 10*3/MM3 (ref 3.4–10.8)

## 2019-02-27 PROCEDURE — 85025 COMPLETE CBC W/AUTO DIFF WBC: CPT | Performed by: OBSTETRICS & GYNECOLOGY

## 2019-02-27 PROCEDURE — 86900 BLOOD TYPING SEROLOGIC ABO: CPT | Performed by: OBSTETRICS & GYNECOLOGY

## 2019-02-27 PROCEDURE — 63710000001 DIPHENHYDRAMINE PER 50 MG: Performed by: OBSTETRICS & GYNECOLOGY

## 2019-02-27 PROCEDURE — 86901 BLOOD TYPING SEROLOGIC RH(D): CPT | Performed by: OBSTETRICS & GYNECOLOGY

## 2019-02-27 PROCEDURE — 99024 POSTOP FOLLOW-UP VISIT: CPT | Performed by: OBSTETRICS & GYNECOLOGY

## 2019-02-27 PROCEDURE — 80053 COMPREHEN METABOLIC PANEL: CPT | Performed by: OBSTETRICS & GYNECOLOGY

## 2019-02-27 RX ORDER — DIPHENHYDRAMINE HCL 25 MG
25 CAPSULE ORAL EVERY 6 HOURS PRN
Status: DISCONTINUED | OUTPATIENT
Start: 2019-02-27 | End: 2019-03-02 | Stop reason: HOSPADM

## 2019-02-27 RX ADMIN — LABETALOL HYDROCHLORIDE 200 MG: 200 TABLET, FILM COATED ORAL at 09:27

## 2019-02-27 RX ADMIN — IBUPROFEN 600 MG: 600 TABLET, FILM COATED ORAL at 23:39

## 2019-02-27 RX ADMIN — MAGNESIUM SULFATE HEPTAHYDRATE 2 G/HR: 40 INJECTION, SOLUTION INTRAVENOUS at 05:10

## 2019-02-27 RX ADMIN — LABETALOL HYDROCHLORIDE 200 MG: 200 TABLET, FILM COATED ORAL at 21:11

## 2019-02-27 RX ADMIN — IBUPROFEN 600 MG: 600 TABLET, FILM COATED ORAL at 15:11

## 2019-02-27 RX ADMIN — SIMETHICONE CHEW TAB 80 MG 80 MG: 80 TABLET ORAL at 21:11

## 2019-02-27 RX ADMIN — IBUPROFEN 600 MG: 600 TABLET, FILM COATED ORAL at 03:09

## 2019-02-27 RX ADMIN — OXYCODONE AND ACETAMINOPHEN 1 TABLET: 5; 325 TABLET ORAL at 23:39

## 2019-02-27 RX ADMIN — OXYCODONE AND ACETAMINOPHEN 1 TABLET: 5; 325 TABLET ORAL at 15:11

## 2019-02-27 RX ADMIN — SODIUM CHLORIDE, POTASSIUM CHLORIDE, SODIUM LACTATE AND CALCIUM CHLORIDE 75 ML/HR: 600; 310; 30; 20 INJECTION, SOLUTION INTRAVENOUS at 09:31

## 2019-02-27 RX ADMIN — MAGNESIUM SULFATE HEPTAHYDRATE 2 G/HR: 40 INJECTION, SOLUTION INTRAVENOUS at 15:06

## 2019-02-27 RX ADMIN — SIMETHICONE CHEW TAB 80 MG 80 MG: 80 TABLET ORAL at 18:11

## 2019-02-27 RX ADMIN — DIPHENHYDRAMINE HYDROCHLORIDE 25 MG: 25 CAPSULE ORAL at 23:39

## 2019-02-27 NOTE — ANESTHESIA POSTPROCEDURE EVALUATION
"Patient: Carey Armando    Procedure Summary     Date:  19 Room / Location:   [facility]    Anesthesia Start:   Anesthesia Stop:      Procedure:   SECTION PRIMARY (N/A Abdomen) Diagnosis:      Surgeon:  Julio César Leal MD Provider:  Low Peterson MD    Anesthesia Type:  spinal ASA Status:  3          Anesthesia Type: spinal  Last vitals  BP   136/96 (19 0000)   Temp   36.4 °C (97.6 °F) (19)   Pulse   66 (19 0000)   Resp   16 (19 0000)     SpO2   97 % (19 0000)     Post Anesthesia Care and Evaluation    Patient location during evaluation: bedside  Patient participation: complete - patient participated  Level of consciousness: sleepy but conscious  Pain score: 0  Pain management: adequate  Airway patency: patent  Anesthetic complications: No anesthetic complications    Cardiovascular status: acceptable  Respiratory status: acceptable  Hydration status: acceptable    Comments: /96 (BP Location: Right arm, Patient Position: Lying)   Pulse 66   Temp 36.4 °C (97.6 °F) (Oral)   Resp 16   Ht 170.2 cm (67\")   Wt 88.5 kg (195 lb)   LMP 2018   SpO2 97%   Breastfeeding? Unknown   BMI 30.54 kg/m²         "

## 2019-02-27 NOTE — ANESTHESIA PROCEDURE NOTES
Spinal Block      Patient reassessed immediately prior to procedure    Patient location during procedure: OB  Start Time: 2/26/2019 9:33 PM  Stop Time: 2/26/2019 9:34 PM  Indication:at surgeon's request  Performed By  Anesthesiologist: Low Peterson MD  Preanesthetic Checklist  Completed: patient identified, site marked, surgical consent, pre-op evaluation, timeout performed, IV checked, risks and benefits discussed and monitors and equipment checked  Spinal Block Prep:  Patient Position:sitting  Sterile Tech:cap, gloves, mask and sterile barriers  Prep:Betadine  Patient Monitoring:blood pressure monitoring, continuous pulse oximetry and EKG  Spinal Block Procedure  Approach:midline  Guidance:landmark technique and palpation technique  Location:L4-L5  Needle Type:Dominique  Needle Gauge:25  Placement of Spinal needle event:cerebrospinal fluid aspirated  Paresthesia: no  Fluid Appearance:clear  Medications: bupivacaine PF (MARCAINE) 0.75 % injection, 1.8 mL  Morphine PF injection, 200 mcg   Post Assessment  Patient Tolerance:patient tolerated the procedure well with no apparent complications  Complications no

## 2019-02-27 NOTE — ANESTHESIA PREPROCEDURE EVALUATION
Anesthesia Evaluation     Patient summary reviewed and Nursing notes reviewed   NPO Solid Status: > 8 hours  NPO Liquid Status: > 4 hours           Airway   Mallampati: II  Neck ROM: full  No difficulty expected  Dental - normal exam     Pulmonary     breath sounds clear to auscultation  Cardiovascular     Rhythm: regular    (+) hypertension poorly controlled,       Neuro/Psych  GI/Hepatic/Renal/Endo    (+) obesity,   liver disease,     Musculoskeletal     Abdominal    Substance History      OB/GYN    (+) Pregnant, Preeclampsia, pregnancy induced hypertension        Other                        Anesthesia Plan    ASA 3     spinal     Anesthetic plan, all risks, benefits, and alternatives have been provided, discussed and informed consent has been obtained with: patient.

## 2019-02-28 LAB
ALBUMIN SERPL-MCNC: 2.7 G/DL (ref 3.5–5.2)
ALBUMIN/GLOB SERPL: 1 G/DL
ALP SERPL-CCNC: 117 U/L (ref 39–117)
ALT SERPL W P-5'-P-CCNC: 91 U/L (ref 1–33)
ANION GAP SERPL CALCULATED.3IONS-SCNC: 9 MMOL/L
AST SERPL-CCNC: 46 U/L (ref 1–32)
BACTERIA SPEC AEROBE CULT: NORMAL
BASOPHILS # BLD AUTO: 0.04 10*3/MM3 (ref 0–0.2)
BASOPHILS NFR BLD AUTO: 0.3 % (ref 0–1.5)
BILIRUB SERPL-MCNC: 0.2 MG/DL (ref 0.1–1.2)
BUN BLD-MCNC: 17 MG/DL (ref 6–20)
BUN/CREAT SERPL: 20.2 (ref 7–25)
CALCIUM SPEC-SCNC: 7.5 MG/DL (ref 8.6–10.5)
CHLORIDE SERPL-SCNC: 97 MMOL/L (ref 98–107)
CO2 SERPL-SCNC: 25 MMOL/L (ref 22–29)
CREAT BLD-MCNC: 0.84 MG/DL (ref 0.57–1)
DEPRECATED RDW RBC AUTO: 46.4 FL (ref 37–54)
EOSINOPHIL # BLD AUTO: 0.2 10*3/MM3 (ref 0–0.4)
EOSINOPHIL NFR BLD AUTO: 1.3 % (ref 0.3–6.2)
ERYTHROCYTE [DISTWIDTH] IN BLOOD BY AUTOMATED COUNT: 13.6 % (ref 12.3–15.4)
GFR SERPL CREATININE-BSD FRML MDRD: 79 ML/MIN/1.73
GLOBULIN UR ELPH-MCNC: 2.6 GM/DL
GLUCOSE BLD-MCNC: 81 MG/DL (ref 65–99)
HCT VFR BLD AUTO: 35.6 % (ref 34–46.6)
HGB BLD-MCNC: 11.7 G/DL (ref 12–15.9)
IMM GRANULOCYTES # BLD AUTO: 0.25 10*3/MM3 (ref 0–0.05)
IMM GRANULOCYTES NFR BLD AUTO: 1.7 % (ref 0–0.5)
LYMPHOCYTES # BLD AUTO: 2.62 10*3/MM3 (ref 0.7–3.1)
LYMPHOCYTES NFR BLD AUTO: 17.6 % (ref 19.6–45.3)
MCH RBC QN AUTO: 30.9 PG (ref 26.6–33)
MCHC RBC AUTO-ENTMCNC: 32.9 G/DL (ref 31.5–35.7)
MCV RBC AUTO: 93.9 FL (ref 79–97)
MONOCYTES # BLD AUTO: 1.1 10*3/MM3 (ref 0.1–0.9)
MONOCYTES NFR BLD AUTO: 7.4 % (ref 5–12)
NEUTROPHILS # BLD AUTO: 10.71 10*3/MM3 (ref 1.4–7)
NEUTROPHILS NFR BLD AUTO: 71.7 % (ref 42.7–76)
NRBC BLD AUTO-RTO: 0 /100 WBC (ref 0–0)
PLATELET # BLD AUTO: 164 10*3/MM3 (ref 140–450)
PMV BLD AUTO: 11.7 FL (ref 6–12)
POTASSIUM BLD-SCNC: 4.6 MMOL/L (ref 3.5–5.2)
PROT SERPL-MCNC: 5.3 G/DL (ref 6–8.5)
RBC # BLD AUTO: 3.79 10*6/MM3 (ref 3.77–5.28)
SODIUM BLD-SCNC: 131 MMOL/L (ref 136–145)
WBC NRBC COR # BLD: 14.92 10*3/MM3 (ref 3.4–10.8)

## 2019-02-28 PROCEDURE — 99024 POSTOP FOLLOW-UP VISIT: CPT | Performed by: OBSTETRICS & GYNECOLOGY

## 2019-02-28 PROCEDURE — 80053 COMPREHEN METABOLIC PANEL: CPT | Performed by: OBSTETRICS & GYNECOLOGY

## 2019-02-28 PROCEDURE — 85025 COMPLETE CBC W/AUTO DIFF WBC: CPT | Performed by: OBSTETRICS & GYNECOLOGY

## 2019-02-28 RX ADMIN — OXYCODONE AND ACETAMINOPHEN 1 TABLET: 5; 325 TABLET ORAL at 04:57

## 2019-02-28 RX ADMIN — Medication 1 TABLET: at 10:05

## 2019-02-28 RX ADMIN — LABETALOL HYDROCHLORIDE 200 MG: 200 TABLET, FILM COATED ORAL at 22:03

## 2019-02-28 RX ADMIN — LABETALOL HYDROCHLORIDE 200 MG: 200 TABLET, FILM COATED ORAL at 10:06

## 2019-02-28 RX ADMIN — DOCUSATE SODIUM 100 MG: 100 CAPSULE, LIQUID FILLED ORAL at 10:07

## 2019-02-28 RX ADMIN — IBUPROFEN 600 MG: 600 TABLET, FILM COATED ORAL at 17:54

## 2019-02-28 RX ADMIN — SIMETHICONE CHEW TAB 80 MG 80 MG: 80 TABLET ORAL at 10:06

## 2019-02-28 RX ADMIN — DOCUSATE SODIUM 100 MG: 100 CAPSULE, LIQUID FILLED ORAL at 22:03

## 2019-02-28 RX ADMIN — OXYCODONE AND ACETAMINOPHEN 1 TABLET: 5; 325 TABLET ORAL at 12:56

## 2019-02-28 RX ADMIN — IBUPROFEN 600 MG: 600 TABLET, FILM COATED ORAL at 10:06

## 2019-02-28 RX ADMIN — SIMETHICONE CHEW TAB 80 MG 80 MG: 80 TABLET ORAL at 17:54

## 2019-03-01 PROCEDURE — 99024 POSTOP FOLLOW-UP VISIT: CPT | Performed by: OBSTETRICS & GYNECOLOGY

## 2019-03-01 RX ADMIN — IBUPROFEN 600 MG: 600 TABLET, FILM COATED ORAL at 22:33

## 2019-03-01 RX ADMIN — OXYCODONE AND ACETAMINOPHEN 1 TABLET: 5; 325 TABLET ORAL at 04:38

## 2019-03-01 RX ADMIN — SIMETHICONE CHEW TAB 80 MG 80 MG: 80 TABLET ORAL at 22:33

## 2019-03-01 RX ADMIN — IBUPROFEN 600 MG: 600 TABLET, FILM COATED ORAL at 04:38

## 2019-03-01 RX ADMIN — LABETALOL HYDROCHLORIDE 200 MG: 200 TABLET, FILM COATED ORAL at 08:46

## 2019-03-01 RX ADMIN — OXYCODONE AND ACETAMINOPHEN 1 TABLET: 5; 325 TABLET ORAL at 14:27

## 2019-03-01 RX ADMIN — OXYCODONE AND ACETAMINOPHEN 1 TABLET: 5; 325 TABLET ORAL at 22:33

## 2019-03-01 RX ADMIN — DOCUSATE SODIUM 100 MG: 100 CAPSULE, LIQUID FILLED ORAL at 22:34

## 2019-03-01 RX ADMIN — Medication 1 TABLET: at 08:48

## 2019-03-01 RX ADMIN — OXYCODONE AND ACETAMINOPHEN 1 TABLET: 5; 325 TABLET ORAL at 08:48

## 2019-03-01 RX ADMIN — LABETALOL HYDROCHLORIDE 200 MG: 200 TABLET, FILM COATED ORAL at 20:58

## 2019-03-01 RX ADMIN — SIMETHICONE CHEW TAB 80 MG 80 MG: 80 TABLET ORAL at 08:47

## 2019-03-01 NOTE — PROGRESS NOTES
3/1/2019    Name:Carey Armando    MR#:[id number]     Progress Note:  Post-Op 3 S/P    HD:7    Subjective   33 y.o. yo Female  s/p CS at 33w4d doing well. Pain well controlled. Tolerating regular diet and having flatus. Lochia normal.     The patient reports feeling well.  She denies headache and is ambulating taking a regular diet without difficulty.  Infant will be hospitalized in the NICU for some time.        Patient Active Problem List   Diagnosis   • Pregnancy resulting from in vitro fertilization, antepartum   • Rh negative state in antepartum period   • Fibroid   • Bilobed placenta   • Pregnancy   • Fall   • Hypertension affecting pregnancy   • Pre-eclampsia in third trimester   • Pruritus   • Cholestasis during pregnancy in third trimester   • Fetal renal anomaly, single gestation   • Pre-eclampsia        Objective    Vitals  Temp:  Temp:  [97.9 °F (36.6 °C)-98.3 °F (36.8 °C)] 98.2 °F (36.8 °C)  Temp src: Oral  BP:  BP: (124-160)/(77-94) 127/81  Pulse:  Heart Rate:  [66-89] 89  RR:   Resp:  [16] 16  Weight: 88.5 kg (195 lb)  BMI:  Body mass index is 30.54 kg/m².    General Awake, alert, no distress  Abdomen Soft, non-distended, fundus firm, 3 below umbilicus, appropriately tender  Incision  Intact, no erythema or exudate  Extremities Calves NT bilaterally     I/O last 3 completed shifts:  In: 2353.6 [P.O.:2040; I.V.:313.6]  Out: 2950 [Urine:2950]    LABS:   Lab Results   Component Value Date    WBC 14.92 (H) 2019    HGB 11.7 (L) 2019    HCT 35.6 2019    MCV 93.9 2019     2019       Infant: male       Assessment    1.  POD 3  2.  Preeclampsia   - BP stable     Plan: Doing well.  Routine postoperative care      Rh negative state in antepartum period    Fibroid    Bilobed placenta    Pregnancy    Pre-eclampsia in third trimester    Cholestasis during pregnancy in third trimester    Fetal renal anomaly, single gestation    Pre-eclampsia      Sathish  MICHEAL Montoya MD  3/1/2019 2:23 PM

## 2019-03-02 VITALS
HEIGHT: 67 IN | SYSTOLIC BLOOD PRESSURE: 149 MMHG | DIASTOLIC BLOOD PRESSURE: 97 MMHG | HEART RATE: 69 BPM | OXYGEN SATURATION: 99 % | RESPIRATION RATE: 18 BRPM | TEMPERATURE: 98.4 F | BODY MASS INDEX: 30.61 KG/M2 | WEIGHT: 195 LBS

## 2019-03-02 PROCEDURE — 99024 POSTOP FOLLOW-UP VISIT: CPT | Performed by: OBSTETRICS & GYNECOLOGY

## 2019-03-02 RX ORDER — IBUPROFEN 600 MG/1
600 TABLET ORAL EVERY 6 HOURS PRN
Qty: 30 TABLET | Refills: 1 | Status: SHIPPED | OUTPATIENT
Start: 2019-03-02 | End: 2020-10-23

## 2019-03-02 RX ORDER — OXYCODONE HYDROCHLORIDE AND ACETAMINOPHEN 5; 325 MG/1; MG/1
2 TABLET ORAL EVERY 4 HOURS PRN
Qty: 30 TABLET | Refills: 0 | Status: SHIPPED | OUTPATIENT
Start: 2019-03-02 | End: 2019-03-08

## 2019-03-02 RX ORDER — LANOLIN
CREAM (ML) TOPICAL AS NEEDED
Status: DISCONTINUED | OUTPATIENT
Start: 2019-03-02 | End: 2019-03-02 | Stop reason: HOSPADM

## 2019-03-02 RX ORDER — LABETALOL 200 MG/1
200 TABLET, FILM COATED ORAL EVERY 12 HOURS SCHEDULED
Qty: 60 TABLET | Refills: 2 | Status: SHIPPED | OUTPATIENT
Start: 2019-03-02 | End: 2019-03-29

## 2019-03-02 RX ADMIN — SIMETHICONE CHEW TAB 80 MG 80 MG: 80 TABLET ORAL at 08:13

## 2019-03-02 RX ADMIN — OXYCODONE AND ACETAMINOPHEN 1 TABLET: 5; 325 TABLET ORAL at 12:20

## 2019-03-02 RX ADMIN — LABETALOL HYDROCHLORIDE 200 MG: 200 TABLET, FILM COATED ORAL at 08:13

## 2019-03-02 RX ADMIN — DOCUSATE SODIUM 100 MG: 100 CAPSULE, LIQUID FILLED ORAL at 08:13

## 2019-03-02 RX ADMIN — Medication 1 TABLET: at 08:13

## 2019-03-02 RX ADMIN — OXYCODONE AND ACETAMINOPHEN 1 TABLET: 5; 325 TABLET ORAL at 08:13

## 2019-03-02 RX ADMIN — Medication: at 09:34

## 2019-03-02 RX ADMIN — IBUPROFEN 600 MG: 600 TABLET, FILM COATED ORAL at 08:12

## 2019-03-02 NOTE — DISCHARGE SUMMARY
section Discharge Summary    Date of Admission: 2019  Date of Discharge:  3/2/2019      Patient: Carey Armando      MR#:[id number]    Surgeon/OB: Julio César Leal MD    Discharge Diagnosis:  section at 33w4d, uncomplicated recovery    Procedures:  , Low Transverse     2019    9:52 PM      Anesthesia:  Spinal     Presenting Problem/History of Present Illness  Pregnancy [Z34.90]  Pre-eclampsia [O14.90]     Patient Active Problem List   Diagnosis   • Pregnancy resulting from in vitro fertilization, antepartum   • Rh negative state in antepartum period   • Fibroid   • Bilobed placenta   • Pregnancy   • Fall   • Hypertension affecting pregnancy   • Pre-eclampsia in third trimester   • Pruritus   • Cholestasis during pregnancy in third trimester   • Fetal renal anomaly, single gestation   • Pre-eclampsia       Hospital Course  Patient is a 33 y.o. female  at 33w4d status post  section with uneventful postoperative recovery.  Patient was advanced to regular diet on postoperative day#1.  On discharge, ambulating, tolerating a regular diet without any difficulties and her incision is dry, clean and intact.     Infant:   male  fetus 2355 g (5 lb 3.1 oz)  with Apgar scores of 8  , 9   at five minutes.    Condition on Discharge:  Stable    Vital Signs  Temp:  [97.7 °F (36.5 °C)-98.5 °F (36.9 °C)] 98.4 °F (36.9 °C)  Heart Rate:  [69-89] 69  Resp:  [16-20] 18  BP: (127-149)/(80-97) 149/97    Lab Results   Component Value Date    WBC 14.92 (H) 2019    HGB 11.7 (L) 2019    HCT 35.6 2019    MCV 93.9 2019     2019     Results from last 7 days   Lab Units 19  0631   SODIUM mmol/L 131*   POTASSIUM mmol/L 4.6   CHLORIDE mmol/L 97*   CO2 mmol/L 25.0   BUN mg/dL 17   CREATININE mg/dL 0.84   CALCIUM mg/dL 7.5*   BILIRUBIN mg/dL 0.2   ALK PHOS U/L 117   ALT (SGPT) U/L 91*   AST (SGOT) U/L 46*   GLUCOSE mg/dL 81         Discharge Disposition  Home or  Self Care    Discharge Medications     Your medication list      START taking these medications      Instructions Last Dose Given Next Dose Due   ibuprofen 600 MG tablet  Commonly known as:  ADVIL,MOTRIN      Take 1 tablet by mouth Every 6 (Six) Hours As Needed for Mild Pain .       oxyCODONE-acetaminophen 5-325 MG per tablet  Commonly known as:  PERCOCET      Take 2 tablets by mouth Every 4 (Four) Hours As Needed for Severe Pain  for up to 6 days.          CONTINUE taking these medications      Instructions Last Dose Given Next Dose Due   aspirin 81 MG EC tablet      Take 81 mg by mouth Daily.       docusate sodium 100 MG capsule  Commonly known as:  COLACE      Take 100 mg by mouth 2 (Two) Times a Day.       labetalol 200 MG tablet  Commonly known as:  NORMODYNE      Take 1 tablet by mouth Every 12 (Twelve) Hours.       magnesium oxide 400 tablet tablet  Commonly known as:  MAG-OX      Take 400 mg by mouth Daily.       prenatal (CLASSIC) vitamin 28-0.8 MG tablet tablet      Take  by mouth Daily.          STOP taking these medications    ursodiol 300 MG capsule  Commonly known as:  ACTIGALL           ASK your doctor about these medications      Instructions Last Dose Given Next Dose Due   diphenhydrAMINE 25 mg capsule  Commonly known as:  BENADRYL      Take 25 mg by mouth Every 6 (Six) Hours As Needed for Itching.             Where to Get Your Medications      These medications were sent to [facility] [address] [place] [address] AT [facility] - [phone] [facility] [phone] [address] [address]    Phone:  [phone]   · ibuprofen 600 MG tablet  · labetalol 200 MG tablet  · oxyCODONE-acetaminophen 5-325 MG per tablet           Discharge Diet: Regular    Follow-up Appointments  No future appointments.  Additional Instructions for the Follow-ups that You Need to Schedule     Call MD for problems / concerns.   As directed      Instructions: Call for  temp>101, vaginal bleeding greater than one pad/hour, severe pain or other concerns.    Order Comments:  Instructions: Call for temp>101, vaginal bleeding greater than one pad/hour, severe pain or other concerns.          Discharge Follow-up with Specialty: Dr. Stone; 1 Week   As directed      Specialty:  Dr. Stone    Follow Up:  1 Week               Lizz Pedro MD  3/2/2019  11:10 AM

## 2019-03-02 NOTE — PROGRESS NOTES
3/2/2019    Name:Carey Armando    MR#:[id number]     Progress Note:  Post-Op 4 S/P    HD:8    Subjective   33 y.o. yo Female  s/p CS at 33w4d doing well. Pain well controlled. Tolerating regular diet and having flatus. Lochia normal.     Patient Active Problem List   Diagnosis   • Pregnancy resulting from in vitro fertilization, antepartum   • Rh negative state in antepartum period   • Fibroid   • Bilobed placenta   • Pregnancy   • Fall   • Hypertension affecting pregnancy   • Pre-eclampsia in third trimester   • Pruritus   • Cholestasis during pregnancy in third trimester   • Fetal renal anomaly, single gestation   • Pre-eclampsia        Objective    Vitals  Temp:  Temp:  [97.7 °F (36.5 °C)-98.5 °F (36.9 °C)] 98.4 °F (36.9 °C)  Temp src: Oral  BP:  BP: (127-149)/(80-97) 149/97  Pulse:  Heart Rate:  [69-89] 69  RR:   Resp:  [16-20] 18  Weight: 88.5 kg (195 lb)  BMI:  Body mass index is 30.54 kg/m².    General Awake, alert, no distress  Abdomen Soft, non-distended, fundus firm, below umbilicus, appropriately tender  Incision  Intact, no erythema or exudate  Extremities Calves NT bilaterally     I/O last 3 completed shifts:  In: 480 [P.O.:480]  Out: 500 [Urine:500]    LABS:   Lab Results   Component Value Date    WBC 14.92 (H) 2019    HGB 11.7 (L) 2019    HCT 35.6 2019    MCV 93.9 2019     2019       Infant: male       Assessment    1.  POD 4  2. Pre-Eclampsia - BP mild range on labetalol 200 BID - continue       Plan: Doing well.  Discharge home       Rh negative state in antepartum period    Fibroid    Bilobed placenta    Pregnancy    Pre-eclampsia in third trimester    Cholestasis during pregnancy in third trimester    Fetal renal anomaly, single gestation    Pre-eclampsia      Lizz Pedro MD  3/2/2019 11:02 AM

## 2019-03-04 ENCOUNTER — TELEPHONE (OUTPATIENT)
Dept: OBSTETRICS AND GYNECOLOGY | Age: 34
End: 2019-03-04

## 2019-03-11 ENCOUNTER — POSTPARTUM VISIT (OUTPATIENT)
Dept: OBSTETRICS AND GYNECOLOGY | Age: 34
End: 2019-03-11

## 2019-03-11 VITALS
BODY MASS INDEX: 27.78 KG/M2 | HEIGHT: 67 IN | SYSTOLIC BLOOD PRESSURE: 118 MMHG | DIASTOLIC BLOOD PRESSURE: 80 MMHG | WEIGHT: 177 LBS

## 2019-03-11 DIAGNOSIS — O14.93 PRE-ECLAMPSIA IN THIRD TRIMESTER: Primary | ICD-10-CM

## 2019-03-11 PROCEDURE — 0503F POSTPARTUM CARE VISIT: CPT | Performed by: OBSTETRICS & GYNECOLOGY

## 2019-03-11 NOTE — PROGRESS NOTES
"Chief complaint: postpartum check    HPI  Carey Armando is a 34 y.o. female presents for BP check and incision check. She is currently doing well. No headaches or worsening edema. She has not been monitoring BP but denies headache or vision changes. Her son is stable NICU. She is taking labetalol 200 mg twice daily.         The following portions of the patient's history were reviewed and updated as appropriate: allergies, current medications, past family history, past medical history, past social history, past surgical history and problem list.    Review of Systems  Pertinent items are noted in HPI.    /80   Ht 170.2 cm (67\")   Wt 80.3 kg (177 lb)   LMP 07/11/2018   Breastfeeding? Yes   BMI 27.72 kg/m²         Physical Exam   Constitutional: She appears well-developed and well-nourished.   Pulmonary/Chest: Effort normal.   Abdominal: Soft. She exhibits no distension. There is no tenderness. There is no guarding.   incision: clean/dry/intact; no surrounding erythema or induration  Ext: no cords or tenderness; no edema    Repeat BP by physician: 135/85      Carey was seen today for postpartum care.    Diagnoses and all orders for this visit:    Pre-eclampsia in third trimester  -     Antithrombin III  -     Antithrombin Antigen  -     Homocysteine  -     Factor 5 Leiden  -     Factor II, DNA Analysis  -     Anticardiolipin Antibody, IgG / M, Qn  -     JULITO  -     Beta-2 glycoprotein antibodies  -     Factor 5 activity  -     Lupus Anticoagulant Panel    Postpartum care and examination      BP currently stable, will continue labetalol 200 mg twice daily. Encouraged pt to start monitoring as she has a cuff and call with significantly elevated or low values. Plan f/u next week    Placental path with vasculopathy: recommend thrombophilia panel. Will proceed with partial evaluation now. Plan protein C and S after 6 weeks pp and explained.          "

## 2019-03-12 ENCOUNTER — APPOINTMENT (OUTPATIENT)
Dept: LAB | Facility: HOSPITAL | Age: 34
End: 2019-03-12

## 2019-03-12 LAB
F5 GENE MUT ANL BLD/T: NORMAL
FACTOR II, DNA ANALYSIS: NORMAL

## 2019-03-12 PROCEDURE — 83090 ASSAY OF HOMOCYSTEINE: CPT | Performed by: OBSTETRICS & GYNECOLOGY

## 2019-03-12 PROCEDURE — 81241 F5 GENE: CPT | Performed by: OBSTETRICS & GYNECOLOGY

## 2019-03-12 PROCEDURE — 85598 HEXAGNAL PHOSPH PLTLT NEUTRL: CPT | Performed by: OBSTETRICS & GYNECOLOGY

## 2019-03-12 PROCEDURE — 86147 CARDIOLIPIN ANTIBODY EA IG: CPT | Performed by: OBSTETRICS & GYNECOLOGY

## 2019-03-12 PROCEDURE — 85610 PROTHROMBIN TIME: CPT | Performed by: OBSTETRICS & GYNECOLOGY

## 2019-03-12 PROCEDURE — 85300 ANTITHROMBIN III ACTIVITY: CPT | Performed by: OBSTETRICS & GYNECOLOGY

## 2019-03-12 PROCEDURE — 85732 THROMBOPLASTIN TIME PARTIAL: CPT | Performed by: OBSTETRICS & GYNECOLOGY

## 2019-03-12 PROCEDURE — 85613 RUSSELL VIPER VENOM DILUTED: CPT | Performed by: OBSTETRICS & GYNECOLOGY

## 2019-03-12 PROCEDURE — 36415 COLL VENOUS BLD VENIPUNCTURE: CPT | Performed by: OBSTETRICS & GYNECOLOGY

## 2019-03-12 PROCEDURE — 85670 THROMBIN TIME PLASMA: CPT | Performed by: OBSTETRICS & GYNECOLOGY

## 2019-03-12 PROCEDURE — 85220 BLOOC CLOT FACTOR V TEST: CPT | Performed by: OBSTETRICS & GYNECOLOGY

## 2019-03-12 PROCEDURE — 85301 ANTITHROMBIN III ANTIGEN: CPT | Performed by: OBSTETRICS & GYNECOLOGY

## 2019-03-12 PROCEDURE — 81240 F2 GENE: CPT | Performed by: OBSTETRICS & GYNECOLOGY

## 2019-03-12 PROCEDURE — 85730 THROMBOPLASTIN TIME PARTIAL: CPT | Performed by: OBSTETRICS & GYNECOLOGY

## 2019-03-12 PROCEDURE — 86038 ANTINUCLEAR ANTIBODIES: CPT | Performed by: OBSTETRICS & GYNECOLOGY

## 2019-03-12 PROCEDURE — 85597 PHOSPHOLIPID PLTLT NEUTRALIZ: CPT | Performed by: OBSTETRICS & GYNECOLOGY

## 2019-03-12 PROCEDURE — 86146 BETA-2 GLYCOPROTEIN ANTIBODY: CPT | Performed by: OBSTETRICS & GYNECOLOGY

## 2019-03-13 LAB
ANA SER QL: NEGATIVE
HCYS SERPL-SCNC: 10.3 UMOL/L (ref 0–15)

## 2019-03-14 LAB
AT III AG PPP IA-ACNC: 90 % (ref 72–124)
FACT V ACT/NOR PPP: 133 % (ref 70–150)

## 2019-03-15 LAB — AT III PPP CHRO-ACNC: 76 % (ref 90–134)

## 2019-03-18 ENCOUNTER — POSTPARTUM VISIT (OUTPATIENT)
Dept: OBSTETRICS AND GYNECOLOGY | Age: 34
End: 2019-03-18

## 2019-03-18 VITALS
HEIGHT: 67 IN | BODY MASS INDEX: 27 KG/M2 | DIASTOLIC BLOOD PRESSURE: 76 MMHG | SYSTOLIC BLOOD PRESSURE: 110 MMHG | WEIGHT: 172 LBS

## 2019-03-18 DIAGNOSIS — Z09 POSTOP CHECK: ICD-10-CM

## 2019-03-18 DIAGNOSIS — O14.93 PRE-ECLAMPSIA IN THIRD TRIMESTER: Primary | ICD-10-CM

## 2019-03-18 PROBLEM — O16.9 HYPERTENSION AFFECTING PREGNANCY: Status: RESOLVED | Noted: 2019-02-15 | Resolved: 2019-03-18

## 2019-03-18 PROBLEM — O09.819 PREGNANCY RESULTING FROM IN VITRO FERTILIZATION, ANTEPARTUM: Status: RESOLVED | Noted: 2018-10-01 | Resolved: 2019-03-18

## 2019-03-18 PROBLEM — O35.EXX0 FETAL RENAL ANOMALY, SINGLE GESTATION: Status: RESOLVED | Noted: 2019-02-22 | Resolved: 2019-03-18

## 2019-03-18 PROBLEM — L29.9 PRURITUS: Status: RESOLVED | Noted: 2019-02-15 | Resolved: 2019-03-18

## 2019-03-18 PROBLEM — O14.90 PRE-ECLAMPSIA: Status: RESOLVED | Noted: 2019-02-22 | Resolved: 2019-03-18

## 2019-03-18 PROCEDURE — 0503F POSTPARTUM CARE VISIT: CPT | Performed by: OBSTETRICS & GYNECOLOGY

## 2019-03-18 NOTE — PROGRESS NOTES
"Chief complaint:postop/preeclampsia    HPI  Carey Armando is a 34 y.o. female presents for f/u. She has been monitoring BP and levels are normal in 's over 60-80's; she denies any preeclamptic symptoms.         The following portions of the patient's history were reviewed and updated as appropriate: allergies, current medications, past family history, past medical history, past social history, past surgical history and problem list.    Review of Systems  Pertinent items are noted in HPI.    /76   Ht 170.2 cm (67\")   Wt 78 kg (172 lb)   Breastfeeding? Yes   BMI 26.94 kg/m²         Physical Exam   Constitutional: She is oriented to person, place, and time. She appears well-developed and well-nourished.   Abdominal: Soft.   Incision clean/dry/intact; healing well   Musculoskeletal:   Ext: no cords or edema, no tenderness   Neurological: She is alert and oriented to person, place, and time.   Skin: Skin is warm and dry.   Psychiatric: She has a normal mood and affect. Her behavior is normal.           Carey was seen today for postpartum care.    Diagnoses and all orders for this visit:    Pre-eclampsia in third trimester    Postop check      Normal postop check. Pt will wean labetalol to 100 mg twice daily. Plan f/u next week  Reviewed antithrombin activity decreased mildly, will repeat at 6 weeks with protein S and C and bile acids. Still awaiting APS panel  Her son is doing well and should be out of NICU soon.          "

## 2019-03-19 LAB
APTT HEX PL PPP: 4 SEC
APTT IMM NP PPP: NORMAL SEC
APTT PPP 1:1 SALINE: NORMAL SEC
APTT PPP: 23.7 SEC
B2 GLYCOPROT1 IGA SER-ACNC: <10 SAU
B2 GLYCOPROT1 IGG SER-ACNC: <10 SGU
B2 GLYCOPROT1 IGM SER-ACNC: <10 SMU
CARDIOLIPIN IGG SER IA-ACNC: <10 GPL
CARDIOLIPIN IGM SER IA-ACNC: <10 MPL
CONFIRM DRVVT: NORMAL SEC
INR PPP: 0.9 RATIO
LAC INTERPRETATION: NORMAL
PLATELET NEUTRALIZATION: 0 SEC
PROTHROMBIN TIME: 9.7 SEC
SCREEN DRVVT/NORMAL: NORMAL RATIO
SCREEN DRVVT: 30.4 SEC
THROMBIN TIME: 16.8 SEC

## 2019-03-21 ENCOUNTER — TELEPHONE (OUTPATIENT)
Dept: OBSTETRICS AND GYNECOLOGY | Age: 34
End: 2019-03-21

## 2019-03-21 NOTE — TELEPHONE ENCOUNTER
----- Message from Rea Stone MD sent at 3/20/2019  2:18 PM EDT -----  Call Carey, her testing for antiphospholipid syndrome is negative/normal

## 2019-03-26 ENCOUNTER — TRANSCRIBE ORDERS (OUTPATIENT)
Dept: ADMINISTRATIVE | Facility: HOSPITAL | Age: 34
End: 2019-03-26

## 2019-03-26 DIAGNOSIS — O92.70 LACTATION DISORDER: Primary | ICD-10-CM

## 2019-03-27 ENCOUNTER — HOSPITAL ENCOUNTER (OUTPATIENT)
Dept: LACTATION | Facility: HOSPITAL | Age: 34
Discharge: HOME | End: 2019-03-27

## 2019-03-27 NOTE — LACTATION NOTE
P1. Carey and atif here to assess latch and milk transfer. Currently following NICU routines and ready to move into what works best at home.  Atif nursed well with a deeper latch and took 32cc in 25 mins between both breasts.  He has been gaining well with 15 mins per one breast and then dad feeds 60-70 cc from the bottle.   Still taking 2 bottles of Neosure per day with vitamins. With the goal of moving to exclusive breastfeeding we discussed letting baby nurse at 3 am feeding and mom will not pump , nor will he get a bottle after  In hopes that he will respond by feeding sooner and with more vigor. Educated on circadian rhythm of milk flow with more available at night and less in late afternoon and early evening making that a better time for  Supplementing.  Atif's adjusted age is 38 weeks on Friday. Suggested following up weekly until he reached due date.    prefeed 3002  postfeed 3032  32 cc in 25 mins , both breasts taken.    Lactation Consult Note    Evaluation Completed: 3/27/2019 12:28 PM  Patient Name: Carey Armando  :  1985  MRN:  [id number]     REFERRAL  INFORMATION:                          Date of Referral: 19   Person Making Referral: patient  Maternal Reason for Referral: breastfeeding currently  Infant Reason for Referral: NICU admission    DELIVERY HISTORY:  This patient has no babies on file.  This patient has no babies on file.  Skin to skin initiation date/time: This patient has no babies on file. This patient has no babies on file.  Skin to skin end date/time: This patient has no babies on file. This patient has no babies on file.  This patient has no babies on file.    MATERNAL ASSESSMENT:  Breast Size Issue: none (19 1105 : Raiza Guerra RN)  Breast Shape: round (19 1105 : Raiza Guerra, RN)  Breast Density: full (19 1105 : Raiza Guerra, RN)  Areola: elastic (19 1105 : Raiza Guerra, RN)  Nipples: everted (19 1105 :  Raiza Guerra, LENY)                INFANT ASSESSMENT:  This patient has no babies on file.  This patient has no babies on file.  This patient has no babies on file.  This patient has no babies on file.  This patient has no babies on file.  This patient has no babies on file.  This patient has no babies on file.  This patient has no babies on file.  This patient has no babies on file.  This patient has no babies on file.  This patient has no babies on file.  This patient has no babies on file.  This patient has no babies on file.  This patient has no babies on file.  This patient has no babies on file.  This patient has no babies on file.  This patient has no babies on file.  This patient has no babies on file.  This patient has no babies on file.  This patient has no babies on file.      This patient has no babies on file.  This patient has no babies on file.  This patient has no babies on file.  This patient has no babies on file.  This patient has no babies on file.  This patient has no babies on file.    This patient has no babies on file.  This patient has no babies on file.  This patient has no babies on file.        MATERNAL INFANT FEEDING:  Maternal Preparation: breast care (03/27/19 1105 : Raiza Guerra RN)  Maternal Emotional State: independent, relaxed (03/27/19 1105 : Raiza Guerra RN)  Infant Positioning: cross-cradle (03/27/19 1105 : Raiza Guerra RN)   Signs of Milk Transfer: infant jaw motion present, suck/swallow ratio (03/27/19 1105 : Raiza Guerra, RN)  Pain with Feeding: no (03/27/19 1105 : Raiza Guerra, RN)           Milk Ejection Reflex: present (03/27/19 1105 : Raiza Guerra, RN)           Latch Assistance: yes (03/27/19 1105 : Raiza Guerra, RN)                               EQUIPMENT TYPE:  Breast Pump Type: double electric, personal (03/27/19 1105 : Raiza Guerra, RN)  Breast Pump Flange Type: hard (03/27/19 1105 : Raiza Guerra  RN)  Breast Pump Flange Size: 27 mm (03/27/19 1105 : Raiza Guerra RN)                        BREAST PUMPING:  Breast Pumping Interventions: post-feed pumping encouraged (03/27/19 1105 : Raiza Guerra RN)  Breast Pumping: bilateral breasts pumped until soft, double electric breast pump utilized (03/27/19 1105 : Raiza Guerra RN)    LACTATION REFERRALS:

## 2019-03-29 ENCOUNTER — POSTPARTUM VISIT (OUTPATIENT)
Dept: OBSTETRICS AND GYNECOLOGY | Age: 34
End: 2019-03-29

## 2019-03-29 VITALS
DIASTOLIC BLOOD PRESSURE: 60 MMHG | WEIGHT: 178 LBS | SYSTOLIC BLOOD PRESSURE: 110 MMHG | HEIGHT: 67 IN | BODY MASS INDEX: 27.94 KG/M2

## 2019-03-29 DIAGNOSIS — O14.93 PRE-ECLAMPSIA IN THIRD TRIMESTER: Primary | ICD-10-CM

## 2019-03-29 DIAGNOSIS — K83.1 CHOLESTASIS DURING PREGNANCY IN THIRD TRIMESTER: ICD-10-CM

## 2019-03-29 DIAGNOSIS — O26.613 CHOLESTASIS DURING PREGNANCY IN THIRD TRIMESTER: ICD-10-CM

## 2019-03-29 PROCEDURE — 0503F POSTPARTUM CARE VISIT: CPT | Performed by: OBSTETRICS & GYNECOLOGY

## 2019-03-29 NOTE — PROGRESS NOTES
"Chief complaint: postpartum BP check    HPI  Carey Armando is a 34 y.o. female presents for BP follow-up; she reports BP's at home have been normal. No heavy bleeding or issues. Her baby is home and doing well.         The following portions of the patient's history were reviewed and updated as appropriate: allergies, current medications, past family history, past medical history, past social history, past surgical history and problem list.    Review of Systems  Pertinent items are noted in HPI.    /60   Ht 170.2 cm (67\")   Wt 80.7 kg (178 lb)   Breastfeeding? Yes   BMI 27.88 kg/m²         Physical Exam   Constitutional: She is oriented to person, place, and time. She appears well-developed and well-nourished.   Abdominal: Soft. There is no tenderness. There is no guarding.   Incision healing well without signs of infection   Musculoskeletal:   Ext: no cords, tenderness or edema; 2+ DTR's   Neurological: She is alert and oriented to person, place, and time.   Psychiatric: She has a normal mood and affect. Her behavior is normal.           Carey was seen today for postpartum care.    Diagnoses and all orders for this visit:    Pre-eclampsia in third trimester    Cholestasis during pregnancy in third trimester    Postpartum care and examination      Pt is doing well. Will wean off labetalol over the next week and f/u for routine pp visit in about 2 weeks; will check protein S and C and repeat antithrombin labs then as well as bile acids          "

## 2019-04-12 ENCOUNTER — POSTPARTUM VISIT (OUTPATIENT)
Dept: OBSTETRICS AND GYNECOLOGY | Age: 34
End: 2019-04-12

## 2019-04-12 VITALS
BODY MASS INDEX: 27.94 KG/M2 | DIASTOLIC BLOOD PRESSURE: 78 MMHG | SYSTOLIC BLOOD PRESSURE: 122 MMHG | HEIGHT: 67 IN | WEIGHT: 178 LBS

## 2019-04-12 DIAGNOSIS — O14.93 PRE-ECLAMPSIA IN THIRD TRIMESTER: Primary | ICD-10-CM

## 2019-04-12 DIAGNOSIS — K83.1 CHOLESTASIS DURING PREGNANCY, ANTEPARTUM: ICD-10-CM

## 2019-04-12 DIAGNOSIS — O26.619 CHOLESTASIS DURING PREGNANCY, ANTEPARTUM: ICD-10-CM

## 2019-04-12 DIAGNOSIS — Z11.51 ENCOUNTER FOR SCREENING FOR HUMAN PAPILLOMAVIRUS (HPV): ICD-10-CM

## 2019-04-12 PROCEDURE — 0503F POSTPARTUM CARE VISIT: CPT | Performed by: OBSTETRICS & GYNECOLOGY

## 2019-04-12 NOTE — PROGRESS NOTES
"Cecilia Armando is a 34 y.o. female who presents for a postpartum visit. She is 6 weeks postpartum following a low cervical transverse  section. I have fully reviewed the prenatal and intrapartum course. The delivery was at 33 gestational weeks. Outcome: primary  section, low transverse incision. Anesthesia: spinal. Postpartum course has been complicated by HTN . Baby's course has been stable after NICU admission. Baby is feeding by breast. Bleeding staining only. Bowel function is normal. Bladder function is normal. Patient is sexually active. 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 Postpartum depression screening: negative.    The following portions of the patient's history were reviewed and updated as appropriate: allergies, current medications, past family history, past medical history, past social history, past surgical history and problem list.    Review of Systems  Pertinent items are noted in HPI.    Objective   /78   Ht 170.2 cm (67\")   Wt 80.7 kg (178 lb)   Breastfeeding? Yes   BMI 27.88 kg/m²    General:  alert, appears stated age, cooperative and no distress    Breasts:  deferred   Lungs: clear to auscultation bilaterally   Heart:  regular rate and rhythm, S1, S2 normal, no murmur, click, rub or gallop   Abdomen: soft, non-tender; bowel sounds normal; no masses,  no organomegaly    Vulva:  normal   Vagina: normal vagina, no discharge, exudate, lesion, or erythema   Cervix:  no lesions   Corpus: normal size, contour, position, consistency, mobility, non-tender   Adnexa:  normal adnexa and no mass, fullness, tenderness   Rectal Exam: Not performed.     Assessment/Plan   Normal postpartum exam. Pap smear done at today's visit.    1. 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 Will check Protein S and C labs and re-evaluate antithrombin labs due to severe  preeclampsia; check bile acids and cmp due to hx cholestasis with pregnancy  3. Follow up in: 6 months " or as needed.

## 2019-04-14 LAB
ALBUMIN SERPL-MCNC: 4.3 G/DL (ref 3.5–5.2)
ALBUMIN/GLOB SERPL: 1.4 G/DL
ALP SERPL-CCNC: 81 U/L (ref 39–117)
ALT SERPL-CCNC: 37 U/L (ref 1–33)
AST SERPL-CCNC: 23 U/L (ref 1–32)
BILE AC SERPL-SCNC: 11.6 UMOL/L (ref 4.7–24.5)
BILIRUB SERPL-MCNC: 0.4 MG/DL (ref 0.2–1.2)
BUN SERPL-MCNC: 15 MG/DL (ref 6–20)
BUN/CREAT SERPL: 17.2 (ref 7–25)
CALCIUM SERPL-MCNC: 10.2 MG/DL (ref 8.6–10.5)
CHLORIDE SERPL-SCNC: 101 MMOL/L (ref 98–107)
CO2 SERPL-SCNC: 27.6 MMOL/L (ref 22–29)
CREAT SERPL-MCNC: 0.87 MG/DL (ref 0.57–1)
GLOBULIN SER CALC-MCNC: 3 GM/DL
GLUCOSE SERPL-MCNC: 96 MG/DL (ref 65–99)
POTASSIUM SERPL-SCNC: 4.4 MMOL/L (ref 3.5–5.2)
PROT SERPL-MCNC: 7.3 G/DL (ref 6–8.5)
SODIUM SERPL-SCNC: 143 MMOL/L (ref 136–145)

## 2019-04-16 ENCOUNTER — TELEPHONE (OUTPATIENT)
Dept: OBSTETRICS AND GYNECOLOGY | Age: 34
End: 2019-04-16

## 2019-04-16 DIAGNOSIS — D68.59 ANTITHROMBIN III DEFICIENCY (HCC): Primary | ICD-10-CM

## 2019-04-16 LAB
AT III ACT/NOR PPP CHRO: 66 % (ref 75–135)
AT III AG PPP IA-ACNC: 94 % (ref 72–124)
CYTOLOGIST CVX/VAG CYTO: NORMAL
CYTOLOGY CVX/VAG DOC THIN PREP: NORMAL
DX ICD CODE: NORMAL
HIV 1 & 2 AB SER-IMP: NORMAL
HPV I/H RISK 4 DNA CVX QL PROBE+SIG AMP: NEGATIVE
OTHER STN SPEC: NORMAL
PATH REPORT.FINAL DX SPEC: NORMAL
PROT C ACT/NOR PPP: 108 % (ref 73–180)
PROT C AG ACT/NOR PPP IA: 99 % (ref 60–150)
PROT S ACT/NOR PPP: 83 % (ref 63–140)
PROT S AG ACT/NOR PPP IA: 75 % (ref 60–150)
PROT S FREE AG ACT/NOR PPP IA: 66 % (ref 57–157)
STAT OF ADQ CVX/VAG CYTO-IMP: NORMAL

## 2019-04-18 ENCOUNTER — LAB (OUTPATIENT)
Dept: LAB | Facility: HOSPITAL | Age: 34
End: 2019-04-18

## 2019-04-18 ENCOUNTER — CONSULT (OUTPATIENT)
Dept: ONCOLOGY | Facility: CLINIC | Age: 34
End: 2019-04-18

## 2019-04-18 VITALS
SYSTOLIC BLOOD PRESSURE: 110 MMHG | HEIGHT: 67 IN | WEIGHT: 180.4 LBS | OXYGEN SATURATION: 98 % | BODY MASS INDEX: 28.31 KG/M2 | DIASTOLIC BLOOD PRESSURE: 60 MMHG | HEART RATE: 76 BPM | RESPIRATION RATE: 16 BRPM

## 2019-04-18 DIAGNOSIS — K83.1 CHOLESTASIS DURING PREGNANCY IN THIRD TRIMESTER: ICD-10-CM

## 2019-04-18 DIAGNOSIS — D68.59 ANTITHROMBIN III DEFICIENCY (HCC): Primary | ICD-10-CM

## 2019-04-18 DIAGNOSIS — O26.613 CHOLESTASIS DURING PREGNANCY IN THIRD TRIMESTER: ICD-10-CM

## 2019-04-18 DIAGNOSIS — O14.93 PRE-ECLAMPSIA IN THIRD TRIMESTER: ICD-10-CM

## 2019-04-18 DIAGNOSIS — D68.59 ANTITHROMBIN 3 DEFICIENCY (HCC): Primary | ICD-10-CM

## 2019-04-18 PROBLEM — Z34.90 PREGNANCY: Status: RESOLVED | Noted: 2018-11-14 | Resolved: 2019-04-18

## 2019-04-18 LAB
BASOPHILS # BLD AUTO: 0.07 10*3/MM3 (ref 0–0.2)
BASOPHILS NFR BLD AUTO: 0.8 % (ref 0–1.5)
DEPRECATED RDW RBC AUTO: 38.3 FL (ref 37–54)
EOSINOPHIL # BLD AUTO: 0.35 10*3/MM3 (ref 0–0.4)
EOSINOPHIL NFR BLD AUTO: 4.2 % (ref 0.3–6.2)
ERYTHROCYTE [DISTWIDTH] IN BLOOD BY AUTOMATED COUNT: 12 % (ref 12.3–15.4)
HCT VFR BLD AUTO: 43 % (ref 34–46.6)
HGB BLD-MCNC: 15.1 G/DL (ref 12–15.9)
IMM GRANULOCYTES # BLD AUTO: 0.05 10*3/MM3 (ref 0–0.05)
IMM GRANULOCYTES NFR BLD AUTO: 0.6 % (ref 0–0.5)
LYMPHOCYTES # BLD AUTO: 3.03 10*3/MM3 (ref 0.7–3.1)
LYMPHOCYTES NFR BLD AUTO: 36.2 % (ref 19.6–45.3)
MCH RBC QN AUTO: 30.5 PG (ref 26.6–33)
MCHC RBC AUTO-ENTMCNC: 35.1 G/DL (ref 31.5–35.7)
MCV RBC AUTO: 86.9 FL (ref 79–97)
MONOCYTES # BLD AUTO: 0.66 10*3/MM3 (ref 0.1–0.9)
MONOCYTES NFR BLD AUTO: 7.9 % (ref 5–12)
NEUTROPHILS # BLD AUTO: 4.2 10*3/MM3 (ref 1.4–7)
NEUTROPHILS NFR BLD AUTO: 50.3 % (ref 42.7–76)
NRBC BLD AUTO-RTO: 0 /100 WBC (ref 0–0)
PLATELET # BLD AUTO: 296 10*3/MM3 (ref 140–450)
PMV BLD AUTO: 10.2 FL (ref 6–12)
RBC # BLD AUTO: 4.95 10*6/MM3 (ref 3.77–5.28)
WBC NRBC COR # BLD: 8.36 10*3/MM3 (ref 3.4–10.8)

## 2019-04-18 PROCEDURE — 36415 COLL VENOUS BLD VENIPUNCTURE: CPT | Performed by: INTERNAL MEDICINE

## 2019-04-18 PROCEDURE — 99244 OFF/OP CNSLTJ NEW/EST MOD 40: CPT | Performed by: INTERNAL MEDICINE

## 2019-04-18 PROCEDURE — 85025 COMPLETE CBC W/AUTO DIFF WBC: CPT | Performed by: INTERNAL MEDICINE

## 2019-04-18 NOTE — PROGRESS NOTES
Subjective     REASON FOR CONSULTATION: Possible Antithrombin III deficiency the patient with preeclampsia and cholestasis during the third trimester pregnancy and low Antithrombin III activity.  Provide an opinion on any further workup or treatment                             REQUESTING PHYSICIAN: Jade Stone MD    RECORDS OBTAINED:  Records of the patients history including those obtained from the referring provider were reviewed and summarized in detail.    HISTORY OF PRESENT ILLNESS:  The patient is a 34 y.o. year old female who is here for an opinion about the above issue.  She is referred to us from her OB/GYN office for evaluation of possible Antithrombin III deficiency.  She recently had a complicated pregnancy which required an emergency  on 2019 at 33 weeks gestation due to development of preeclampsia and cholestasis.    On her postpartum evaluation she had thrombophilia labs performed which were unremarkable with the exception of a decreased level of anti-thrombin 3 activity of 76% in spite of a normal level of anti-thrombin 3 antigen.  This was initially performed on 3/12/2019.  The abnormality persisted on follow-up labs from 2019 with anti-thrombin 3 activity of 66% with a normal anti-thrombin antigen level of 94%.    Carey does not have any personal history of venous thrombosis in the past and reports that she had been on birth control pills for about 10 years in the past without any thrombotic issues.  Likewise, she does not have any known family history of thrombosis problems.      History of Present Illness     Past Medical History:   Diagnosis Date   • Antithrombin III deficiency (CMS/HCC)    • Cholestasis    •  product of in vitro fertilization (IVF) pregnancy 2018   • PIH (pregnancy induced hypertension)         Past Surgical History:   Procedure Laterality Date   •  SECTION N/A 2019    Procedure:  SECTION PRIMARY;  Surgeon: Julio César Leal MD;   Location: [facility] LABOR DELIVERY;  Service: Obstetrics/Gynecology   • LASIK     • WISDOM TOOTH EXTRACTION          Current Outpatient Medications on File Prior to Visit   Medication Sig Dispense Refill   • magnesium oxide (MAG-OX) 400 tablet tablet Take 400 mg by mouth Daily.     • Prenatal Vit-Fe Fumarate-FA (PRENATAL, CLASSIC, VITAMIN) 28-0.8 MG tablet tablet Take  by mouth Daily.     • aspirin 81 MG EC tablet Take 81 mg by mouth Daily.     • diphenhydrAMINE (BENADRYL) 25 mg capsule Take 25 mg by mouth Every 6 (Six) Hours As Needed for Itching.     • docusate sodium (COLACE) 100 MG capsule Take 100 mg by mouth 2 (Two) Times a Day.     • ibuprofen (ADVIL,MOTRIN) 600 MG tablet Take 1 tablet by mouth Every 6 (Six) Hours As Needed for Mild Pain . 30 tablet 1     No current facility-administered medications on file prior to visit.         ALLERGIES:    Allergies   Allergen Reactions   • Ceclor [Cefaclor] Hives     And swelling- no anaphylaxis        Social History     Socioeconomic History   • Marital status:      Spouse name: Vasile   • Number of children: 1   • Years of education: Not on file   • Highest education level: Not on file   Occupational History     Employer: [employer]   Tobacco Use   • Smoking status: Never Smoker   • Smokeless tobacco: Never Used   Substance and Sexual Activity   • Alcohol use: No     Frequency: Never     Comment: occasional   • Drug use: No   • Sexual activity: Yes     Partners: Male     Birth control/protection: OCP, None        Family History   Problem Relation Age of Onset   • Colon cancer Father 54   • Hypertension Father    • Breast cancer Maternal Grandmother         postmenopausal   • Prostate cancer Paternal Uncle    • Deep vein thrombosis Neg Hx    • Pulmonary embolism Neg Hx         Review of Systems   Constitutional: Negative for activity change, chills, fatigue and fever.   HENT: Negative for mouth sores, trouble swallowing and voice change.   "  Eyes: Negative for pain and visual disturbance.   Respiratory: Negative for cough, shortness of breath and wheezing.    Cardiovascular: Negative for chest pain and palpitations.   Gastrointestinal: Negative for abdominal pain, constipation, diarrhea, nausea and vomiting.   Genitourinary: Negative for difficulty urinating, frequency and urgency.   Musculoskeletal: Negative for arthralgias and joint swelling.   Skin: Negative for rash.   Neurological: Negative for dizziness, seizures, weakness and headaches.   Hematological: Negative for adenopathy. Does not bruise/bleed easily.   Psychiatric/Behavioral: Negative for behavioral problems and confusion. The patient is not nervous/anxious.         Objective     Vitals:    19 0935   BP: 110/60   Pulse: 76   Resp: 16   SpO2: 98%   Weight: 81.8 kg (180 lb 6.4 oz)   Height: 170.2 cm (67.01\")   PainSc: 0-No pain     Current Status 2019   ECOG score 0       Physical Exam   Constitutional: She is oriented to person, place, and time. She appears well-developed and well-nourished. No distress.   HENT:   Head: Normocephalic.   Eyes: Conjunctivae and EOM are normal. Pupils are equal, round, and reactive to light. No scleral icterus.   Neck: Normal range of motion. Neck supple. No JVD present. No thyromegaly present.   Cardiovascular: Normal rate and regular rhythm. Exam reveals no gallop and no friction rub.   No murmur heard.  Pulmonary/Chest: Effort normal and breath sounds normal. She has no wheezes. She has no rales.   Abdominal: Soft. She exhibits no distension and no mass. There is no tenderness.   Healed  scar   Musculoskeletal: Normal range of motion. She exhibits no edema or deformity.   Lymphadenopathy:     She has no cervical adenopathy.   Neurological: She is alert and oriented to person, place, and time. She has normal reflexes. No cranial nerve deficit.   Skin: Skin is warm and dry. No rash noted. No erythema.   Psychiatric: She has a normal mood " "and affect. Her behavior is normal. Judgment normal.         RECENT LABS:  Hematology WBC   Date Value Ref Range Status   2019 8.36 3.40 - 10.80 10*3/mm3 Final   2019 10.8 3.4 - 10.8 x10E3/uL Final     RBC   Date Value Ref Range Status   2019 4.95 3.77 - 5.28 10*6/mm3 Final   2019 4.34 3.77 - 5.28 x10E6/uL Final     Hemoglobin   Date Value Ref Range Status   2019 15.1 12.0 - 15.9 g/dL Final     Hematocrit   Date Value Ref Range Status   2019 43.0 34.0 - 46.6 % Final     Platelets   Date Value Ref Range Status   2019 296 140 - 450 10*3/mm3 Final        2019  AntiThromb III Func 75 - 135 % 66 Abnormally low     Antithrombin Antigen 72 - 124 % 94      Lab Results   Component Value Date    GLUCOSE 81 2019    BUN 15 2019    CREATININE 0.87 2019    EGFRIFNONA 75 2019    EGFRIFAFRI 91 2019    BCR 17.2 2019    K 4.4 2019    CO2 27.6 2019    CALCIUM 10.2 2019    PROTENTOTREF 7.3 2019    ALBUMIN 4.30 2019    LABIL2 1.4 2019    AST 23 2019    ALT 37 (H) 2019     3/12/2019  Antithrombin Antigen 72 - 124 % 90      Antithrombin Activity 90 - 134 % 76 Abnormally low         Assessment/Plan     1.  Possible type II inherited Antithrombin III deficiency with normal antigen level but persistently low Antithrombin III activity.  2.  Complicated pregnancy which required in vitro fertilization and  delivery at 33 weeks gestation due to preeclampsia and cholestasis.  Both mother and baby seem to be doing well at this time about 2 months out from delivery  3.  Placental pathology report indicated evidence of \"vasculopathy\"  4.  Full hypercoagulable laboratory evaluation in 2019 which was normal with the exception of the low Antithrombin III functional activity.    Recommendations  1.  We spent the majority of the visit today discussing the genetics of anti-thrombin 3 deficiency and counseling " the patient regarding anti-thrombin 3 deficiency.  I suspect that she does have an inherited type II Antithrombin III deficiency although we will plan to perform another anti-thrombin 3 level about 3 months from now to be sure that this is not artificially low related to her complicated pregnancy and .  2.  This appears to be relatively mild Antithrombin III deficiency and it certainly reassuring that the patient has not had any venous thromboses in spite of being on birth control pills for several years in the past.  3.  If the Antithrombin III assay in 3 months confirms functional Antithrombin III deficiency we still do not feel that the patient would require routine anticoagulation but certainly may require more aggressive thromboprophylaxis at times of higher risk.  It also would be reasonable with her history to consider prophylactic Lovenox in the event of any future pregnancies although we certainly cannot say for sure that the problems that she experienced with this pregnancy were the result of her mild Antithrombin III deficiency.    We have asked the patient to return to our office in 3 months and she will have repeat labs drawn 1 week prior to that visit including anti-thrombin 3 antigen and activity level, fibrinogen level, CMP and CBC.  Further final recommendations may follow from there based on those results.    Thanks for allowing us to see this nice patient in consultation.

## 2019-06-19 NOTE — TELEPHONE ENCOUNTER
ARTIFICIAL TEARS to affected eye(s) as needed. Called pt and reviewed labs. Recommend consult with Hematologist regarding low value of antithrombin function. Mildly increased ALT is improved from hospital values and bile acids are now normal. Pt understands. Advised her to call if she does not receive contact regarding appt.

## 2019-07-03 ENCOUNTER — LAB (OUTPATIENT)
Dept: LAB | Facility: HOSPITAL | Age: 34
End: 2019-07-03

## 2019-07-03 DIAGNOSIS — O14.93 PRE-ECLAMPSIA IN THIRD TRIMESTER: ICD-10-CM

## 2019-07-03 DIAGNOSIS — K83.1 CHOLESTASIS DURING PREGNANCY IN THIRD TRIMESTER: ICD-10-CM

## 2019-07-03 DIAGNOSIS — D68.59 ANTITHROMBIN 3 DEFICIENCY (HCC): ICD-10-CM

## 2019-07-03 DIAGNOSIS — O26.613 CHOLESTASIS DURING PREGNANCY IN THIRD TRIMESTER: ICD-10-CM

## 2019-07-03 LAB
ALBUMIN SERPL-MCNC: 5 G/DL (ref 3.5–5.2)
ALBUMIN/GLOB SERPL: 1.7 G/DL (ref 1.1–2.4)
ALP SERPL-CCNC: 83 U/L (ref 38–116)
ALT SERPL W P-5'-P-CCNC: 19 U/L (ref 0–33)
ANION GAP SERPL CALCULATED.3IONS-SCNC: 14 MMOL/L (ref 5–15)
AST SERPL-CCNC: 19 U/L (ref 0–32)
BASOPHILS # BLD AUTO: 0.06 10*3/MM3 (ref 0–0.2)
BASOPHILS NFR BLD AUTO: 0.8 % (ref 0–1.5)
BILIRUB SERPL-MCNC: 0.5 MG/DL (ref 0.2–1.2)
BUN BLD-MCNC: 17 MG/DL (ref 6–20)
BUN/CREAT SERPL: 20.2 (ref 7.3–30)
CALCIUM SPEC-SCNC: 10.2 MG/DL (ref 8.5–10.2)
CHLORIDE SERPL-SCNC: 103 MMOL/L (ref 98–107)
CO2 SERPL-SCNC: 26 MMOL/L (ref 22–29)
CREAT BLD-MCNC: 0.84 MG/DL (ref 0.6–1.1)
DEPRECATED RDW RBC AUTO: 37.7 FL (ref 37–54)
EOSINOPHIL # BLD AUTO: 0.19 10*3/MM3 (ref 0–0.4)
EOSINOPHIL NFR BLD AUTO: 2.5 % (ref 0.3–6.2)
ERYTHROCYTE [DISTWIDTH] IN BLOOD BY AUTOMATED COUNT: 12.4 % (ref 12.3–15.4)
FIBRINOGEN PPP-MCNC: 331 MG/DL (ref 219–464)
GFR SERPL CREATININE-BSD FRML MDRD: 79 ML/MIN/1.73
GLOBULIN UR ELPH-MCNC: 3 GM/DL (ref 1.8–3.5)
GLUCOSE BLD-MCNC: 97 MG/DL (ref 74–124)
HCT VFR BLD AUTO: 45.2 % (ref 34–46.6)
HGB BLD-MCNC: 15.5 G/DL (ref 12–15.9)
IMM GRANULOCYTES # BLD AUTO: 0.01 10*3/MM3 (ref 0–0.05)
IMM GRANULOCYTES NFR BLD AUTO: 0.1 % (ref 0–0.5)
LYMPHOCYTES # BLD AUTO: 2.86 10*3/MM3 (ref 0.7–3.1)
LYMPHOCYTES NFR BLD AUTO: 38.2 % (ref 19.6–45.3)
MCH RBC QN AUTO: 28.8 PG (ref 26.6–33)
MCHC RBC AUTO-ENTMCNC: 34.3 G/DL (ref 31.5–35.7)
MCV RBC AUTO: 84 FL (ref 79–97)
MONOCYTES # BLD AUTO: 0.59 10*3/MM3 (ref 0.1–0.9)
MONOCYTES NFR BLD AUTO: 7.9 % (ref 5–12)
NEUTROPHILS # BLD AUTO: 3.77 10*3/MM3 (ref 1.7–7)
NEUTROPHILS NFR BLD AUTO: 50.5 % (ref 42.7–76)
NRBC BLD AUTO-RTO: 0 /100 WBC (ref 0–0.2)
PLATELET # BLD AUTO: 277 10*3/MM3 (ref 140–450)
PMV BLD AUTO: 10.1 FL (ref 6–12)
POTASSIUM BLD-SCNC: 4.5 MMOL/L (ref 3.5–4.7)
PROT SERPL-MCNC: 8 G/DL (ref 6.3–8)
RBC # BLD AUTO: 5.38 10*6/MM3 (ref 3.77–5.28)
SODIUM BLD-SCNC: 143 MMOL/L (ref 134–145)
WBC NRBC COR # BLD: 7.48 10*3/MM3 (ref 3.4–10.8)

## 2019-07-03 PROCEDURE — 85300 ANTITHROMBIN III ACTIVITY: CPT | Performed by: INTERNAL MEDICINE

## 2019-07-03 PROCEDURE — 80053 COMPREHEN METABOLIC PANEL: CPT | Performed by: INTERNAL MEDICINE

## 2019-07-03 PROCEDURE — 36415 COLL VENOUS BLD VENIPUNCTURE: CPT | Performed by: INTERNAL MEDICINE

## 2019-07-03 PROCEDURE — 85025 COMPLETE CBC W/AUTO DIFF WBC: CPT | Performed by: INTERNAL MEDICINE

## 2019-07-03 PROCEDURE — 85384 FIBRINOGEN ACTIVITY: CPT | Performed by: INTERNAL MEDICINE

## 2019-07-05 LAB — AT III PPP CHRO-ACNC: 72 % (ref 90–134)

## 2019-07-11 ENCOUNTER — APPOINTMENT (OUTPATIENT)
Dept: LAB | Facility: HOSPITAL | Age: 34
End: 2019-07-11

## 2019-07-11 ENCOUNTER — OFFICE VISIT (OUTPATIENT)
Dept: ONCOLOGY | Facility: CLINIC | Age: 34
End: 2019-07-11

## 2019-07-11 VITALS
DIASTOLIC BLOOD PRESSURE: 84 MMHG | RESPIRATION RATE: 16 BRPM | HEIGHT: 67 IN | BODY MASS INDEX: 27.37 KG/M2 | HEART RATE: 70 BPM | SYSTOLIC BLOOD PRESSURE: 117 MMHG | WEIGHT: 174.4 LBS | TEMPERATURE: 98.2 F | OXYGEN SATURATION: 98 %

## 2019-07-11 DIAGNOSIS — D68.59 ANTITHROMBIN 3 DEFICIENCY (HCC): Primary | ICD-10-CM

## 2019-07-11 PROCEDURE — G0463 HOSPITAL OUTPT CLINIC VISIT: HCPCS | Performed by: INTERNAL MEDICINE

## 2019-07-11 PROCEDURE — 99214 OFFICE O/P EST MOD 30 MIN: CPT | Performed by: INTERNAL MEDICINE

## 2019-07-11 NOTE — PROGRESS NOTES
Subjective     REASON FOR FOLLOW UP:  Antithrombin III deficiency       HISTORY OF PRESENT ILLNESS:  The patient is a 34 y.o. year old female who was referred to us from her OB/GYN office for evaluation of possible Antithrombin III deficiency.  She recently had a complicated pregnancy which required an emergency  on 2019 at 33 weeks gestation due to development of preeclampsia and cholestasis.    On her postpartum evaluation she had thrombophilia labs performed which were unremarkable with the exception of a decreased level of anti-thrombin 3 activity of 76% in spite of a normal level of anti-thrombin 3 antigen.  This was initially performed on 3/12/2019.  The abnormality persisted on follow-up labs from 2019 with anti-thrombin 3 activity of 66% with a normal anti-thrombin antigen level of 94%.  With her initial consult visit on 2019 we elected to have her return today with a follow-up Antithrombin III level.  The recent Antithrombin III level was drawn on 7/3/2019 and remains low at 72% therefore we feel the patient does indeed have mild Antithrombin III deficiency.    Carey does not have any personal history of venous thrombosis in the past and reports that she had previously been on birth control pills for about 10 years in the past without any thrombotic issues.  Likewise, she does not have any known family history of thrombosis problems.    She tells me today that her sister, Winsome Waldron, also was tested for anti-thrombin 3 and has a very similar level of activity around 70%.  She actually has been referred to our office and will be seen as a new consult on 2019.      History of Present Illness     Past Medical History:   Diagnosis Date   • Antithrombin III deficiency (CMS/HCC)    • Cholestasis    • H/O foreign travel     [place]   • Infectious mononucleosis    •  product of in vitro fertilization (IVF) pregnancy 2018   • PIH (pregnancy induced hypertension)          Past Surgical History:   Procedure Laterality Date   •  SECTION N/A 2019    Procedure:  SECTION PRIMARY;  Surgeon: Julio César Leal MD;  Location: [facility] LABOR DELIVERY;  Service: Obstetrics/Gynecology   • LASIK     • WISDOM TOOTH EXTRACTION          Current Outpatient Medications on File Prior to Visit   Medication Sig Dispense Refill   • Prenatal Vit-Fe Fumarate-FA (PRENATAL, CLASSIC, VITAMIN) 28-0.8 MG tablet tablet Take  by mouth Daily.     • diphenhydrAMINE (BENADRYL) 25 mg capsule Take 25 mg by mouth Every 6 (Six) Hours As Needed for Itching.     • docusate sodium (COLACE) 100 MG capsule Take 100 mg by mouth 2 (Two) Times a Day.     • ibuprofen (ADVIL,MOTRIN) 600 MG tablet Take 1 tablet by mouth Every 6 (Six) Hours As Needed for Mild Pain . 30 tablet 1   • magnesium oxide (MAG-OX) 400 tablet tablet Take 400 mg by mouth Daily.     • [DISCONTINUED] aspirin 81 MG EC tablet Take 81 mg by mouth Daily.       No current facility-administered medications on file prior to visit.         ALLERGIES:    Allergies   Allergen Reactions   • Ceclor [Cefaclor] Hives     And swelling- no anaphylaxis        Social History     Socioeconomic History   • Marital status:      Spouse name: Vasile   • Number of children: 1   • Years of education: College   • Highest education level: Not on file   Occupational History   • Occupation: Occupational therapist     Employer: [employer]   Tobacco Use   • Smoking status: Never Smoker   • Smokeless tobacco: Never Used   Substance and Sexual Activity   • Alcohol use: No     Frequency: Never     Comment: occasional   • Drug use: No   • Sexual activity: Yes     Partners: Male     Birth control/protection: OCP, None        Family History   Problem Relation Age of Onset   • Colon cancer Father 54   • Hypertension Father    • Breast cancer Maternal Grandmother         postmenopausal   • Prostate cancer Paternal Uncle    • Deep vein thrombosis Neg Hx    •  "Pulmonary embolism Neg Hx         Review of Systems   Constitutional: Negative for activity change, chills, fatigue and fever.   HENT: Negative for mouth sores, trouble swallowing and voice change.    Eyes: Negative for pain and visual disturbance.   Respiratory: Negative for cough, shortness of breath and wheezing.    Cardiovascular: Negative for chest pain and palpitations.   Gastrointestinal: Negative for abdominal pain, constipation, diarrhea, nausea and vomiting.   Genitourinary: Negative for difficulty urinating, frequency and urgency.   Musculoskeletal: Negative for arthralgias and joint swelling.   Skin: Negative for rash.   Neurological: Negative for dizziness, seizures, weakness and headaches.   Hematological: Negative for adenopathy. Does not bruise/bleed easily.   Psychiatric/Behavioral: Negative for behavioral problems and confusion. The patient is not nervous/anxious.         Objective     Vitals:    19 0751   BP: 117/84   Pulse: 70   Resp: 16   Temp: 98.2 °F (36.8 °C)   SpO2: 98%   Weight: 79.1 kg (174 lb 6.4 oz)   Height: 170.2 cm (67.01\")   PainSc: 0-No pain     Current Status 2019   ECOG score 0       Physical Exam   Constitutional: She is oriented to person, place, and time. She appears well-developed and well-nourished. No distress.   HENT:   Head: Normocephalic.   Eyes: Conjunctivae and EOM are normal. Pupils are equal, round, and reactive to light. No scleral icterus.   Neck: Normal range of motion. Neck supple. No JVD present. No thyromegaly present.   Cardiovascular: Normal rate and regular rhythm. Exam reveals no gallop and no friction rub.   No murmur heard.  Pulmonary/Chest: Effort normal and breath sounds normal. She has no wheezes. She has no rales.   Abdominal: Soft. She exhibits no distension and no mass. There is no tenderness.   Healed  scar   Musculoskeletal: Normal range of motion. She exhibits no edema or deformity.   Lymphadenopathy:     She has no cervical " adenopathy.   Neurological: She is alert and oriented to person, place, and time. She has normal reflexes. No cranial nerve deficit.   Skin: Skin is warm and dry. No rash noted. No erythema.   Psychiatric: She has a normal mood and affect. Her behavior is normal. Judgment normal.         RECENT LABS:  Hematology WBC   Date Value Ref Range Status   2019 7.48 3.40 - 10.80 10*3/mm3 Final   2019 10.8 3.4 - 10.8 x10E3/uL Final     RBC   Date Value Ref Range Status   2019 5.38 (H) 3.77 - 5.28 10*6/mm3 Final   2019 4.34 3.77 - 5.28 x10E6/uL Final     Hemoglobin   Date Value Ref Range Status   2019 15.5 12.0 - 15.9 g/dL Final     Hematocrit   Date Value Ref Range Status   2019 45.2 34.0 - 46.6 % Final     Platelets   Date Value Ref Range Status   2019 277 140 - 450 10*3/mm3 Final      7/3/2019  Antithrombin Activity 90 - 134 % 72 Abnormally low       Fibrinogen 219 - 464 mg/dL 331        2019  AntiThromb III Func 75 - 135 % 66 Abnormally low       Antithrombin Antigen 72 - 124 % 94        3/12/2019  Antithrombin Activity 90 - 134 % 76 Abnormally low        Lab Results   Component Value Date    GLUCOSE 97 2019    BUN 17 2019    CREATININE 0.84 2019    EGFRIFNONA 79 2019    EGFRIFAFRI 91 2019    BCR 20.2 2019    K 4.5 2019    CO2 26.0 2019    CALCIUM 10.2 2019    PROTENTOTREF 7.3 2019    ALBUMIN 5.00 2019    LABIL2 1.4 2019    AST 19 2019    ALT 19 2019         Assessment/Plan     1.  Type II inherited Antithrombin III deficiency with normal antigen level but persistently low Antithrombin III activity.    2.  Complicated pregnancy which required in vitro fertilization and  delivery at 33 weeks gestation due to preeclampsia and cholestasis.  Both mother and baby seem to be doing well at this time about 5 months out from delivery    3.  Placental pathology report indicated evidence of  "\"vasculopathy\"    4.  Full hypercoagulable laboratory evaluation in March 2019 which was normal with the exception of the low Antithrombin III functional activity.    Plan  1.  We spent the majority of the visit today discussing the genetics of anti-thrombin 3 deficiency and counseling the patient regarding anti-thrombin 3 deficiency.  This is inherited in an autosomal dominant fashion and therefore her parents may wish to be tested and her offspring have a 50% chance of being similarly affected.    2.  This appears to be relatively mild Antithrombin III deficiency and it certainly reassuring that the patient has not had any venous thromboses in spite of being on birth control pills for several years in the past.    3.  We still do not feel that the patient requires routine anticoagulation but certainly she may require more aggressive thromboprophylaxis at times of higher risk.  It also would be reasonable with her history to have her see a maternal-fetal medicine specialist and consider prophylactic Lovenox in the event of any future pregnancies although we certainly cannot say for sure that the problems that she experienced with this pregnancy were the result of her mild Antithrombin III deficiency.    4.  We also discussed that her son may need to be tested in the future but this does not need to happen until he is an adolescent.    5.  In the future, the patient will require postpartum thromboprophylaxis and also would require more aggressive thromboprophylaxis following surgical procedures or long periods of immobilization.    6.  We have not scheduled any routine follow-up in our office but certainly would be happy to see Carey again at any time in the future if new questions or concerns arise.      Thanks for allowing us to see this nice patient in consultation.             "

## 2019-07-19 ENCOUNTER — OFFICE VISIT (OUTPATIENT)
Dept: OBSTETRICS AND GYNECOLOGY | Age: 34
End: 2019-07-19

## 2019-07-19 ENCOUNTER — PROCEDURE VISIT (OUTPATIENT)
Dept: OBSTETRICS AND GYNECOLOGY | Age: 34
End: 2019-07-19

## 2019-07-19 VITALS
BODY MASS INDEX: 26.81 KG/M2 | SYSTOLIC BLOOD PRESSURE: 118 MMHG | HEIGHT: 67 IN | DIASTOLIC BLOOD PRESSURE: 70 MMHG | WEIGHT: 170.8 LBS

## 2019-07-19 DIAGNOSIS — D68.59 ANTITHROMBIN 3 DEFICIENCY (HCC): ICD-10-CM

## 2019-07-19 DIAGNOSIS — D21.9 FIBROID: Primary | ICD-10-CM

## 2019-07-19 DIAGNOSIS — D25.9 UTERINE LEIOMYOMA, UNSPECIFIED LOCATION: Primary | ICD-10-CM

## 2019-07-19 PROBLEM — O43.199 BILOBED PLACENTA: Status: RESOLVED | Noted: 2018-10-30 | Resolved: 2019-07-19
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
## 2019-07-19 PROCEDURE — 99212 OFFICE O/P EST SF 10 MIN: CPT | Performed by: OBSTETRICS & GYNECOLOGY

## 2019-07-19 PROCEDURE — 76830 TRANSVAGINAL US NON-OB: CPT | Performed by: OBSTETRICS & GYNECOLOGY

## 2019-07-19 NOTE — PROGRESS NOTES
"Chief complaint:uterine fibroid    HPI  Carey Armando is a 34 y.o. female presents for f/u u/s for fibroids and review of heme recommendations. She is breastfeeding and denies any issues.         The following portions of the patient's history were reviewed and updated as appropriate: allergies, current medications, past family history, past medical history, past social history, past surgical history and problem list.    Review of Systems  Pertinent items are noted in HPI.    /70   Ht 170.2 cm (67\")   Wt 77.5 kg (170 lb 12.8 oz)   Breastfeeding? Yes   BMI 26.75 kg/m²         Physical Exam   Constitutional: She appears well-developed and well-nourished.   Psychiatric: She has a normal mood and affect. Her behavior is normal.     tv u/s: uterus with 2 small fibroid, larges 2.5 X 3 cm. Normal ovaries, right with simple follicular cyst      Carey was seen today for gynecologic exam.    Diagnoses and all orders for this visit:    Fibroid    Antithrombin 3 deficiency (CMS/HCC)      Fibroids noted measuring 2.5 X 3.1 cm and 1.1 X 1.2 cm. Reviewed some risks associated with fibroids in pregnancy but no current indication for excision.     Reviewed Heme consult, they recommend prophylactic lovenox with future pregnancies    rtc 6 months for annual or prn          "

## 2019-10-17 ENCOUNTER — TELEPHONE (OUTPATIENT)
Dept: OBSTETRICS AND GYNECOLOGY | Age: 34
End: 2019-10-17

## 2019-10-17 RX ORDER — FLUCONAZOLE 100 MG/1
TABLET ORAL
Qty: 18 TABLET | Refills: 0 | Status: SHIPPED | OUTPATIENT
Start: 2019-10-17 | End: 2021-03-02

## 2020-02-12 ENCOUNTER — OFFICE VISIT (OUTPATIENT)
Dept: OBSTETRICS AND GYNECOLOGY | Age: 35
End: 2020-02-12

## 2020-02-12 VITALS
BODY MASS INDEX: 27.03 KG/M2 | HEIGHT: 67 IN | WEIGHT: 172.2 LBS | DIASTOLIC BLOOD PRESSURE: 72 MMHG | SYSTOLIC BLOOD PRESSURE: 110 MMHG

## 2020-02-12 DIAGNOSIS — Z01.419 ENCOUNTER FOR GYNECOLOGICAL EXAMINATION: Primary | ICD-10-CM

## 2020-02-12 PROBLEM — O14.93 PRE-ECLAMPSIA IN THIRD TRIMESTER: Status: RESOLVED | Noted: 2019-02-15 | Resolved: 2020-02-12

## 2020-02-12 PROCEDURE — 99395 PREV VISIT EST AGE 18-39: CPT | Performed by: OBSTETRICS & GYNECOLOGY

## 2020-02-12 NOTE — PROGRESS NOTES
.  Subjective     Chief Complaint   Patient presents with   • Gynecologic Exam     AE   Last pap 1/15/18-, HPV-       History of Present Illness    Carey Armando is a 34 y.o.  who presents for annual exam.  She weaned from breastfeeding in December. Her menses returned about 6 months postpartum but flow has been sporadic since. She just had a normal menses recently. 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 She was never given odds of spontaneous pregnancy. She is open to pregnancy currently. She plans to return to Dr. Vidal in 2 months      Obstetric History:  OB History        1    Para   1    Term   0       1    AB   0    Living   1       SAB   0    TAB   0    Ectopic   0    Molar        Multiple   0    Live Births   1               Menstrual History:     Patient's last menstrual period was 02/10/2020 (exact date).         Current contraception: none  History of abnormal Pap smear: yes - once but repeated and normal  Received Gardasil immunization: yes  Perform regular self breast exam: no  Family history of uterine or ovarian cancer: no  Family History of colon cancer: yes - father--dx at 49 yo  Family history of breast cancer: yes - MGM, postmenopausal    Mammogram: not indicated.  Colonoscopy: not indicated.--will start at 43 yo due to family hx  DEXA: not indicated.    Exercise: moderately active  Calcium/Vitamin D: adequate intake    The following portions of the patient's history were reviewed and updated as appropriate: allergies, current medications, past family history, past medical history, past social history, past surgical history and problem list.    Review of Systems    Review of Systems   Constitutional: Negative for fatigue.   Respiratory: Negative for shortness of breath.    Gastrointestinal: Negative for abdominal pain. negative for change in bowel habits  Genitourinary: Negative for dysuria. negative for excessive bleeding  Neurological: Negative  "for headaches.   Psychiatric/Behavioral: Negative for dysphoric mood.         Objective   Physical Exam    /72   Ht 170.2 cm (67\")   Wt 78.1 kg (172 lb 3.2 oz)   LMP 02/10/2020 (Exact Date)   Breastfeeding No   BMI 26.97 kg/m²   General:   alert and no distress   Heart: regular rate and rhythm   Lungs: clear to auscultation bilaterally   Breast: Negative with inspection; no masses, retractions, nipple discharge or axillary adenopathy   Neck: Supple, no thyromegaly   Abdomen: Soft, nontender    No guarding/rebound   Pelvis: External genitalia: normal general appearance  Vaginal: normal mucosa without prolapse or lesions  Cervix: normal appearance  Adnexa: no masses or tenderness  Uterus: normal, nontender   Extremities: Extremities normal, atraumatic, no cyanosis or edema   Neurologic: Alert and oriented   Psychiatric: Normal affect, judgement, and mood     Assessment/Plan   Carey was seen today for gynecologic exam.    Diagnoses and all orders for this visit:    Encounter for gynecological examination        All questions answered.  Breast self exam technique reviewed and patient encouraged to perform self-exam monthly.  Discussed healthy lifestyle modifications.  Recommended 30 minutes of aerobic exercise five times per week.    Pt is aware she is not a candidate for estrogen due to antithrombin 3 def. She will call if successful IVF this spring for appt.           "

## 2020-05-04 ENCOUNTER — TELEPHONE (OUTPATIENT)
Dept: ONCOLOGY | Facility: CLINIC | Age: 35
End: 2020-05-04

## 2020-05-05 ENCOUNTER — TELEPHONE (OUTPATIENT)
Dept: ONCOLOGY | Facility: CLINIC | Age: 35
End: 2020-05-05

## 2020-05-05 NOTE — TELEPHONE ENCOUNTER
Per Dr. Gallegos okay for in vitro fertilization but avoid hormonal therapies that would further increase her risk of thrombosis in the setting of hereditary Antithrombin 3 deficiency. I called pt and got her MD name and number to send this too. While on the phone she also asked if I would ask Dr. Gallegos if there was any birth control she could be on for a couple weeks to a month in order to get her cycle right for in vitro. Message sent to Dr. Gallegos. Per Dr. Gallegos on birth control he states not my call to make. She will have to ask her Ob/Gyn for recommendation for that issue. Put this in the letter to Dr. العراقي and also sent a copy to pts [facility].     ----- Message from Teetee Pacheco RN sent at 5/4/2020  2:16 PM EDT -----  Regarding: Dr. Gallegos  Patient is about to start fertility treatments and her dr needs a letter from dr. Gallegos stating that patient can get pregnant with her blood disorder.         Call patient back at [phone]

## 2020-05-18 ENCOUNTER — TELEPHONE (OUTPATIENT)
Dept: OBSTETRICS AND GYNECOLOGY | Age: 35
End: 2020-05-18

## 2020-05-18 NOTE — TELEPHONE ENCOUNTER
Pt called stated she's seeing reproductive endocrinologist.  Pt needs to know if she should stay away from all forms of BC or are certain ones okay.  Her Hematologist advised her to stay away from hormonally therapy but BC will be up to her OBGYN. Please advise

## 2020-05-19 ENCOUNTER — TELEPHONE (OUTPATIENT)
Dept: OBSTETRICS AND GYNECOLOGY | Age: 35
End: 2020-05-19

## 2020-05-19 NOTE — TELEPHONE ENCOUNTER
MD out-of-office on 5/18/20. Reviewed message and called pt back to answer questions. No answer. Left message of call.

## 2020-05-19 NOTE — TELEPHONE ENCOUNTER
Called pt back today as I was out-of-office prior day. Called pt and no answer. Please advise pt that she has to avoid any medication with estrogen due to risk of blood clots/stroke.

## 2020-10-23 ENCOUNTER — OFFICE VISIT (OUTPATIENT)
Dept: OBSTETRICS AND GYNECOLOGY | Age: 35
End: 2020-10-23

## 2020-10-23 ENCOUNTER — TELEPHONE (OUTPATIENT)
Dept: OBSTETRICS AND GYNECOLOGY | Age: 35
End: 2020-10-23

## 2020-10-23 ENCOUNTER — PROCEDURE VISIT (OUTPATIENT)
Dept: OBSTETRICS AND GYNECOLOGY | Age: 35
End: 2020-10-23

## 2020-10-23 VITALS
SYSTOLIC BLOOD PRESSURE: 100 MMHG | HEIGHT: 67 IN | DIASTOLIC BLOOD PRESSURE: 60 MMHG | WEIGHT: 170.4 LBS | BODY MASS INDEX: 26.74 KG/M2

## 2020-10-23 DIAGNOSIS — Z34.90 EARLY STAGE OF PREGNANCY: ICD-10-CM

## 2020-10-23 DIAGNOSIS — Z67.91 RH NEGATIVE STATE IN ANTEPARTUM PERIOD: ICD-10-CM

## 2020-10-23 DIAGNOSIS — O09.299 HX OF PREECLAMPSIA, PRIOR PREGNANCY, CURRENTLY PREGNANT: ICD-10-CM

## 2020-10-23 DIAGNOSIS — R82.71 ASYMPTOMATIC BACTERIURIA IN PREGNANCY: Primary | ICD-10-CM

## 2020-10-23 DIAGNOSIS — D68.59 ANTITHROMBIN 3 DEFICIENCY (HCC): ICD-10-CM

## 2020-10-23 DIAGNOSIS — O09.819 PREGNANCY RESULTING FROM ASSISTED REPRODUCTIVE TECHNOLOGY, ANTEPARTUM: Primary | ICD-10-CM

## 2020-10-23 DIAGNOSIS — O99.891 ASYMPTOMATIC BACTERIURIA IN PREGNANCY: Primary | ICD-10-CM

## 2020-10-23 DIAGNOSIS — O26.899 RH NEGATIVE STATE IN ANTEPARTUM PERIOD: ICD-10-CM

## 2020-10-23 DIAGNOSIS — Z13.29 SCREENING FOR THYROID DISORDER: ICD-10-CM

## 2020-10-23 LAB — EXTERNAL NIPT: NORMAL

## 2020-10-23 PROCEDURE — 76817 TRANSVAGINAL US OBSTETRIC: CPT | Performed by: OBSTETRICS & GYNECOLOGY

## 2020-10-23 PROCEDURE — 90686 IIV4 VACC NO PRSV 0.5 ML IM: CPT | Performed by: OBSTETRICS & GYNECOLOGY

## 2020-10-23 PROCEDURE — 99213 OFFICE O/P EST LOW 20 MIN: CPT | Performed by: OBSTETRICS & GYNECOLOGY

## 2020-10-23 PROCEDURE — 90471 IMMUNIZATION ADMIN: CPT | Performed by: OBSTETRICS & GYNECOLOGY

## 2020-10-23 NOTE — PROGRESS NOTES
"Chief complaint:early pregnancy    HPI  Carey Armando is a 35 y.o. female presents for early pregnancy u/s. She denies pelvic pain or bleeding. She has some mild nausea but no vomiting. She has IVF for this pregancy.         The following portions of the patient's history were reviewed and updated as appropriate: allergies, current medications, past family history, past medical history, past social history, past surgical history and problem list.    Review of Systems  Constitutional: negative for fevers  Gastrointestinal: positive for nausea, negative for vomiting  Genitourinary:negative for pelvic pain or vaginal bleeding  Integument/breast: positive for breast tenderness    /60   Ht 170.2 cm (67\")   Wt 77.3 kg (170 lb 6.4 oz)   LMP 08/07/2020 (Exact Date)   Breastfeeding No   BMI 26.69 kg/m²         Physical Exam  Constitutional:       Appearance: Normal appearance.   HENT:      Head: Atraumatic.   Pulmonary:      Effort: Pulmonary effort is normal.   Genitourinary:     Comments: No pain noted with tv imaging of uterus/adnexa  Neurological:      General: No focal deficit present.      Mental Status: She is alert and oriented to person, place, and time.   Psychiatric:         Mood and Affect: Mood normal.         Behavior: Behavior normal.       tv u/s: viable IUP, size consistent with established dates  Possible small subchorionic hemorrhage noted      Diagnoses and all orders for this visit:    1. Asymptomatic bacteriuria in pregnancy (Primary)  -     Urine Culture - Urine, Urine, Clean Catch    2. Early stage of pregnancy  -     OB Panel With HIV    3. Screening for thyroid disorder  -     TSH    4. Antithrombin 3 deficiency (CMS/HCC)  -     [facility] [place]    5. Hx of preeclampsia, prior pregnancy, currently pregnant  -     [facility] [place]  -     Comprehensive Metabolic Panel  -     Protein, Urine, 24 Hour - Urine, Clean Catch    6. Rh negative state in antepartum " period        Plan f/u 2 weeks for OB intake and f/u u/s  Referred to M due to high risk history and thrombophilia; pt saw HEME   Pt desires anueploidy screening today; she reports she had neg CF screen with fertility MD in past    Will continue daily lovenox due to antithrombin def and OB hx; also recommend she continue baby asa through 36 weeks    Rh neg: discussed indications for rhogam and pt understands.     Pt desires flu shot and given today.    Possible small subchorionic hemorrhage on u/s today. Pt denies any pain/bleeding; plan repeat u/s at f/u. Pt advised to call immediately with any vaginal bleeding    Reviewed pnguidelines and instructions

## 2020-10-23 NOTE — TELEPHONE ENCOUNTER
ABDIFATAH FROM [facility]  CALLED FOR REFILL      REFILL Enoxaparin SODIUM [phone] OP 1 OP 6   REF# [id number]  OR [phone]  [address] [address]

## 2020-10-24 LAB
ABO GROUP BLD: ABNORMAL
ALBUMIN SERPL-MCNC: 4.4 G/DL (ref 3.5–5.2)
ALBUMIN/GLOB SERPL: 2 G/DL
ALP SERPL-CCNC: 52 U/L (ref 39–117)
ALT SERPL-CCNC: 13 U/L (ref 1–33)
AST SERPL-CCNC: 14 U/L (ref 1–32)
BASOPHILS # BLD AUTO: 0.1 X10E3/UL (ref 0–0.2)
BASOPHILS NFR BLD AUTO: 1 %
BILIRUB SERPL-MCNC: 0.2 MG/DL (ref 0–1.2)
BLD GP AB SCN SERPL QL: NEGATIVE
BUN SERPL-MCNC: 9 MG/DL (ref 6–20)
BUN/CREAT SERPL: 12.9 (ref 7–25)
CALCIUM SERPL-MCNC: 9.5 MG/DL (ref 8.6–10.5)
CHLORIDE SERPL-SCNC: 102 MMOL/L (ref 98–107)
CO2 SERPL-SCNC: 25 MMOL/L (ref 22–29)
CREAT SERPL-MCNC: 0.7 MG/DL (ref 0.57–1)
EOSINOPHIL # BLD AUTO: 0.2 X10E3/UL (ref 0–0.4)
EOSINOPHIL NFR BLD AUTO: 1 %
ERYTHROCYTE [DISTWIDTH] IN BLOOD BY AUTOMATED COUNT: 12.2 % (ref 11.7–15.4)
GLOBULIN SER CALC-MCNC: 2.2 GM/DL
GLUCOSE SERPL-MCNC: 86 MG/DL (ref 65–99)
HBV SURFACE AG SERPL QL IA: NEGATIVE
HCT VFR BLD AUTO: 40.1 % (ref 34–46.6)
HCV AB S/CO SERPL IA: <0.1 S/CO RATIO (ref 0–0.9)
HGB BLD-MCNC: 13.9 G/DL (ref 11.1–15.9)
HIV 1+2 AB+HIV1 P24 AG SERPL QL IA: NON REACTIVE
IMM GRANULOCYTES # BLD AUTO: 0.1 X10E3/UL (ref 0–0.1)
IMM GRANULOCYTES NFR BLD AUTO: 1 %
LYMPHOCYTES # BLD AUTO: 2.6 X10E3/UL (ref 0.7–3.1)
LYMPHOCYTES NFR BLD AUTO: 23 %
MCH RBC QN AUTO: 30.5 PG (ref 26.6–33)
MCHC RBC AUTO-ENTMCNC: 34.7 G/DL (ref 31.5–35.7)
MCV RBC AUTO: 88 FL (ref 79–97)
MONOCYTES # BLD AUTO: 0.7 X10E3/UL (ref 0.1–0.9)
MONOCYTES NFR BLD AUTO: 6 %
NEUTROPHILS # BLD AUTO: 7.6 X10E3/UL (ref 1.4–7)
NEUTROPHILS NFR BLD AUTO: 68 %
PLATELET # BLD AUTO: 292 X10E3/UL (ref 150–450)
POTASSIUM SERPL-SCNC: 4.7 MMOL/L (ref 3.5–5.2)
PROT SERPL-MCNC: 6.6 G/DL (ref 6–8.5)
RBC # BLD AUTO: 4.55 X10E6/UL (ref 3.77–5.28)
RH BLD: NEGATIVE
RPR SER QL: NON REACTIVE
RUBV IGG SERPL IA-ACNC: 9.2 INDEX
SODIUM SERPL-SCNC: 138 MMOL/L (ref 136–145)
TSH SERPL DL<=0.005 MIU/L-ACNC: 1.24 UIU/ML (ref 0.45–4.5)
WBC # BLD AUTO: 11.1 X10E3/UL (ref 3.4–10.8)

## 2020-10-25 LAB
BACTERIA UR CULT: NORMAL
BACTERIA UR CULT: NORMAL

## 2020-10-26 ENCOUNTER — TELEPHONE (OUTPATIENT)
Dept: OBSTETRICS AND GYNECOLOGY | Age: 35
End: 2020-10-26

## 2020-10-26 NOTE — TELEPHONE ENCOUNTER
----- Message from Rea Stone MD sent at 10/25/2020  8:04 AM EDT -----  Please call Carey, her prenatal labs are essentially normal. Her WBC is slightly increased which is common in pregnancy. Her fetal chromosome labs are pending.

## 2020-10-27 ENCOUNTER — TELEPHONE (OUTPATIENT)
Dept: OBSTETRICS AND GYNECOLOGY | Age: 35
End: 2020-10-27

## 2020-10-27 LAB
PROT 24H UR-MRATE: 140 MG/24HOURS (ref 0–150)
PROT UR-MCNC: 7 MG/DL

## 2020-10-27 NOTE — TELEPHONE ENCOUNTER
Patient returned call to office and was given test results that baseline 24 hour which is upper normal range for protein.

## 2020-10-27 NOTE — TELEPHONE ENCOUNTER
----- Message from Rea Stone MD sent at 10/27/2020  7:57 AM EDT -----  Call pt, reviewed her baseline 24 hour which is upper normal range for protein.

## 2020-11-04 ENCOUNTER — OFFICE VISIT (OUTPATIENT)
Dept: OBSTETRICS AND GYNECOLOGY | Facility: CLINIC | Age: 35
End: 2020-11-04

## 2020-11-04 ENCOUNTER — TRANSCRIBE ORDERS (OUTPATIENT)
Dept: ULTRASOUND IMAGING | Facility: HOSPITAL | Age: 35
End: 2020-11-04

## 2020-11-04 ENCOUNTER — HOSPITAL ENCOUNTER (OUTPATIENT)
Dept: ULTRASOUND IMAGING | Facility: HOSPITAL | Age: 35
Discharge: HOME | End: 2020-11-04
Admitting: OBSTETRICS & GYNECOLOGY

## 2020-11-04 ENCOUNTER — TELEPHONE (OUTPATIENT)
Dept: OBSTETRICS AND GYNECOLOGY | Age: 35
End: 2020-11-04

## 2020-11-04 VITALS
TEMPERATURE: 98 F | HEIGHT: 67 IN | HEART RATE: 84 BPM | WEIGHT: 170.25 LBS | BODY MASS INDEX: 26.72 KG/M2 | SYSTOLIC BLOOD PRESSURE: 127 MMHG | DIASTOLIC BLOOD PRESSURE: 79 MMHG

## 2020-11-04 DIAGNOSIS — Z13.79 GENETIC SCREENING: ICD-10-CM

## 2020-11-04 DIAGNOSIS — Z98.891 PREVIOUS CESAREAN SECTION: ICD-10-CM

## 2020-11-04 DIAGNOSIS — O09.819 PREGNANCY RESULTING FROM ASSISTED REPRODUCTIVE TECHNOLOGY, ANTEPARTUM: ICD-10-CM

## 2020-11-04 DIAGNOSIS — D68.59 ANTITHROMBIN 3 DEFICIENCY (HCC): Primary | ICD-10-CM

## 2020-11-04 DIAGNOSIS — O09.299 HX OF PREECLAMPSIA, PRIOR PREGNANCY, CURRENTLY PREGNANT: ICD-10-CM

## 2020-11-04 PROCEDURE — 76813 OB US NUCHAL MEAS 1 GEST: CPT

## 2020-11-04 PROCEDURE — 76813 OB US NUCHAL MEAS 1 GEST: CPT | Performed by: OBSTETRICS & GYNECOLOGY

## 2020-11-04 PROCEDURE — 99243 OFF/OP CNSLTJ NEW/EST LOW 30: CPT | Performed by: OBSTETRICS & GYNECOLOGY

## 2020-11-04 NOTE — TELEPHONE ENCOUNTER
ESSENCE PT CALLED WANTING TO KNOW TEST RESULTS FOR GENETIC TESTING, STATES THEY SPOKE WITH MELINA AND THEY HAVE BEEN SENT.  PLEASE ADVISE.    [phone]

## 2020-11-04 NOTE — PROGRESS NOTES
MATERNAL FETAL MEDICINE OUTPATIENT NOTE     Dear Dr Stone    This is a  new visit.     Thank you for requesting my service to provide consultation for Carey Armando is a 35 y.o.  at  12 3/7 weeks gestation (Estimated Date of Delivery: 2021.).   She is being seen for:genetic counseling and fetal anatomy survey. Also here for management of thrombophilia in pregnancy.     History of cesaran birth in 33rd week gestation for preeclampsia with severe preeclampsia. 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 This prompted thrombophilia workup. Positive Antithrombin III deficiency.   Did not have a acquired thrombophilia workup.   Denies any history of blood clots.      She currently is on Lovenox 40 mg SQ daily     Today, she denies headache, blurry vision, RUQ pain. No vaginal bleeding, no contractions.     Chief complaint    ICD-10-CM ICD-9-CM   1. Antithrombin 3 deficiency (CMS/[facility])  D68.59 289.81   2. Hx of preeclampsia, prior pregnancy, currently pregnant  O09.299 V23.49   3. Pregnancy resulting from assisted reproductive technology, antepartum  O09.819 V23.85   4. Genetic screening  Z13.79 V82.79   5. Previous  section  Z98.891 V45.89       Past obstetric, gynecological, medical, surgical, family and social history reviewed; no changes made.     Review of systems  Constitutional:  No Weight Change, No Fever, No Chills, No Fatigue, No Malaise  ENT/Mouth:  No Hearing Changes, No Sinus Pain, No Hoarseness, No sore throat, No   Eyes:  No Eye Pain, No Vision Changes  Cardiovascular:  No Chest Pain, No SOB, No Palpitations  Respiratory:  No Cough, No Wheezing, No Dyspnea  Gastrointestinal:  No Nausea, No Vomiting, No Diarrhea, No Constipation, No Pain   Genitourinary:   No Dysuria, No Urinary Frequency, No Hematuria, No Flank Pain  Musculoskeletal:  No Arthralgias, No Myalgias,   Skin:  No Skin Lesions, No Pruritis,   Neuro:  No Weakness, No Numbness, No Loss of Consciousness, No  "Syncope  Psych:  No Memory Changes, No Violence/Abuse Hx., No Eating Concerns  Heme/Lymph:  No Bruising, No Bleeding  Endocrine:   No Temperature Intolerance    Vitals:    20 0908   BP: 127/79   BP Location: Right arm   Patient Position: Sitting   Cuff Size: Adult   Pulse: 84   Temp: 98 °F (36.7 °C)   TempSrc: Infrared   Weight: 77.2 kg (170 lb 4 oz)   Height: 170.2 cm (67\")       PHYSICAL EXAM   GENERAL: Not in acute distress, gravid   NEURO: awake, alert and oriented to person, place, and time  ABDOMINAL: No fundal tenderness, no rebound or guarding   EXTREMITIES: Bilaterally lower extremities No edema, no tenderness  PELVIC: No obvious vaginal discharge, no bleeding    ULTRASOUND   Please view full ultrasound note on Imaging tab in ViewPoint.      ASSESSMENT   35 y.o.  at  12 3/7 weeks gestation (Estimated Date of Delivery: 2021) reassuring fetal and maternal status.     1. Single live intrauterine pregnancy   [ X ] stable  [   ] improving [  ] worsening    2. Inherited Thrombophilias - Antithrombin III deficiency.   [ X ] stable  [   ] improving [  ] worsening    factor V Leiden mutation - negative   prothrombin [id number] mutation  - negative   protein S, and protein C deficiencies- negative     \"Normal pregnancy physiology is marked by increased clotting potential, decreased anticoagulant activity, and decreased fibrinolysis. Women who are pregnant or in the postpartum period have a fourfold to fivefold increased risk of thromboembolism compared with nonpregnant women.  The risk of recurrent VTE is increased threefold to fourfold. Inherited thrombophilias are associated with increased risk of VTE, which makes detection of these mutations a logical target for prevention of the morbidity and mortality of VTE in the peripartum period. However, it is controversial whether there is an association between inherited thrombophilias and uteroplacental thrombosis that leads to adverse pregnancy outcomes " "such as fetal loss, preeclampsia, fetal growth restriction, and placental abruption\" [Practice Bulletin.  VOL. 132, NO. 1, 2018]    High-risk thrombophilias include factor V Leiden homozygosity, prothrombin gene [id number] mutation homozygosity, heterozygosity for factor V Leiden and prothrombin S60692T mutation, or antithrombin deficiency    Per ACOG:   High-risk thrombophilia without previous VTE  -Prophylactic or intermediatedose LMWH/UFH    -Postpartum prophylactic anticoagulation therapy or intermediate-dose LMWH/UFH    I recommend intermediate dosing = Enoxaparin 40 mg SC every 12 hours    “Antiphospholipid syndrome (APS) is an autoimmune disorder defined by the presence of characteristic clinical features and specified levels of circulating antiphospholipid antibodies. Antiphospholipid antibodies have been associated with a variety of medical problems, including arterial thrombosis and venous thrombosis, autoimmune thrombocytopenia, and fetal loss. In addition to fetal loss, several obstetric complications have been associated with antiphospholipid antibodies, including preeclampsia, intrauterine growth restriction, placental insufficiency, and  delivery. The three antiphospholipid antibodies that contribute to the diagnosis of antiphospholipid syndrome are 1) lupus anticoagulant, 2) anticardiolipin, and 3) anti-â2 -glycoprotein-1. Because transient positive test results may occur, the diagnosis of APS requires two positive antiphospholipid antibody test results at least 12 weeks apart. Current consensus guidelines suggest that a positive result is greater than the 99th percentile.” (OBSTETRICS & GYNECOLOGY. VOL. 120, NO. 6, 2012)    Recommend antiphospholipid syndrome workup - this will not change medication management, but recommended to be done for complete thrombophilia workup.      3. Genetic testing   [ X ] stable  [   ] improving [  ] worsening    NIPT low risk - reviewed with patient "     4. In vitro fertilization  [ X ] stable  [   ] improving [  ] worsening  Evidence based medicine has suggested that infants conceived through IVF have a slightly higher rate of major birth defects. [Moon CK,Fertil Steril 2005; 84:9672-5402.]  Recommend fetal echocardiogram between 20 - 24 weeks. Or inform Pediatrics at time of delivery that this is an IVF pregnancy, and Pediatrics can determine if more extensive  workup is warranted.     5. History of preeclampsia  [ X ] stable  [   ] improving [  ] worsening    We discussed the etiology and the recurrence risk of preeclampsia. In general, the earlier and the more severe or the preeclampsia the higher the recurrence risk. We reviewed the signs and symptoms and discussed risk reduction strategies.     -Preeclampsia prophylaxis: Recommend aspirin 81 mg PO daily until approximately 36 weeks gestation or at signs of labor, whichever comes first (curently on aspirin)   -Baseline preeclampsia labs: CBC, CMP = normal, and 24 hour urine protein (140mg)  -Recommend Ambulatory blood pressure monitoring several times per week and goal blood pressure LESS than 140/90  -Counseled to inform primary OB or L+D if BP > 140/90mmHG or if has headache/blurry vision/RUQ or epigastric pain    6. Previous  delivery   [ X ] stable  [   ] improving [  ] worsening    Lower uterine segment assessment at anatomy survey       PLAN  -Primary OB should - order 1) lupus anticoagulant, 2) anticardiolipin, and 3) anti-â2 -glycoprotein-1  -Recommend fetal echocardiogram between 20 - 24 weeks. Or inform Pediatrics at time of delivery that this is an IVF pregnancy, and Pediatrics can determine if more extensive  workup is warranted.   -Fetal anatomy survey 18 - 20 weeks gestation   -After anatomy scan = Serial growth ultrasounds every 4-6 weeks   -Starting at 32-34 weeks: Weekly fetal  surveillance until delivery   -Starting at 28 weeks: Fetal movement instructions  given continue daily until delivery; instructed to report to labor and delivery if cannot achieve more than 10 kicks in one hour or if she perceives a decrease in fetal movement      MEDICATION MANAGEMENT  -Primary OB should adjust prescription of Lovenox to: Enoxaparin 40 mg SC every 12 hours  -Switch at 35 - 36 weeks gestation: Heparin unfractionated 10,000 units SC every 12 hours in the third trimester, unless  the aPTT is elevated  -Stop anticoagulation the night before scheduled  delivery or at signs of labor(whichever comes first)  -Postpartum anticoagulation can be managed by hematology (to discuss option for oral anticoagulation versus subcutaneous) for minimum 6 weeks postpartum         This note has been routed to the referring obstetricians/medical provider.   Thank you for your clinical consult.     CHRIS WALLACE MD MPH FACOG  MATERNAL FETAL MEDICINE

## 2020-11-04 NOTE — TELEPHONE ENCOUNTER
ESSENCE PT CALLED, STATED SHE JUST LEFT [facility], AND THEY WENT OVER RESULTS WITH PT.    [phone]

## 2020-11-04 NOTE — TELEPHONE ENCOUNTER
Called pt back and verified that she understood. Pt fully understood and was happy about results and baby's gender.

## 2020-11-05 ENCOUNTER — TELEPHONE (OUTPATIENT)
Dept: OBSTETRICS AND GYNECOLOGY | Age: 35
End: 2020-11-05

## 2020-11-05 DIAGNOSIS — D68.59 ANTITHROMBIN 3 DEFICIENCY (HCC): Primary | ICD-10-CM

## 2020-11-05 DIAGNOSIS — O09.299 HX OF PREECLAMPSIA, PRIOR PREGNANCY, CURRENTLY PREGNANT: ICD-10-CM

## 2020-11-05 NOTE — TELEPHONE ENCOUNTER
Called pt and discussed MFM consult. [facility] recommended screen for antiphospholipid syndrome. Pt was screened following last pregnancy and testing was normal. Placed orders for repeat testing. Pt will consider but believes M was not aware she tested previously. Placed orders for lovenox 40 mg SC twice daily as advised by [facility] and discussed with pt. Jake MULTANI advised prophylactic dosing. Pt aware and will consider how she wants to proceed. Discussed that she could continue daily dosing or increase to twice daily as advised by [facility]. Reviewed risks and benefits of both options.

## 2020-11-10 ENCOUNTER — ROUTINE PRENATAL (OUTPATIENT)
Dept: OBSTETRICS AND GYNECOLOGY | Age: 35
End: 2020-11-10

## 2020-11-10 VITALS — SYSTOLIC BLOOD PRESSURE: 100 MMHG | DIASTOLIC BLOOD PRESSURE: 60 MMHG | WEIGHT: 171.2 LBS | BODY MASS INDEX: 26.81 KG/M2

## 2020-11-10 DIAGNOSIS — D68.59 ANTITHROMBIN 3 DEFICIENCY (HCC): ICD-10-CM

## 2020-11-10 DIAGNOSIS — O09.819 PREGNANCY RESULTING FROM ASSISTED REPRODUCTIVE TECHNOLOGY, ANTEPARTUM: ICD-10-CM

## 2020-11-10 DIAGNOSIS — O26.613 CHOLESTASIS DURING PREGNANCY IN THIRD TRIMESTER: ICD-10-CM

## 2020-11-10 DIAGNOSIS — K83.1 CHOLESTASIS DURING PREGNANCY IN THIRD TRIMESTER: ICD-10-CM

## 2020-11-10 DIAGNOSIS — Z82.79 FAMILY HISTORY OF CONGENITAL ANOMALY: ICD-10-CM

## 2020-11-10 DIAGNOSIS — Z3A.13 13 WEEKS GESTATION OF PREGNANCY: Primary | ICD-10-CM

## 2020-11-10 LAB
CLARITY, POC: CLEAR
COLOR UR: YELLOW
EXTERNAL CYSTIC FIBROSIS: NORMAL
GLUCOSE UR STRIP-MCNC: NEGATIVE MG/DL
PROT UR STRIP-MCNC: NEGATIVE MG/DL
VZV IGG SER QL: NORMAL

## 2020-11-10 PROCEDURE — 0501F PRENATAL FLOW SHEET: CPT | Performed by: OBSTETRICS & GYNECOLOGY

## 2020-11-10 RX ORDER — ASPIRIN 81 MG/1
81 TABLET ORAL DAILY
COMMUNITY
End: 2021-04-13

## 2020-11-10 NOTE — PROGRESS NOTES
Pt presents for OB intake. She denies any issues today.   She saw [facility]. She has not increased her lovenox to twice daily but plans to do so later in 2nd trimester.     A/p: antithrombin III def: pt on lovenox, currently on prophylactic dose but plans to increase to twice daily per MF recommendations later in pregnancy. Will check cbc today.    Hx preeclampsia: pt on baby asa. Had normal baseline 24 hour urine    Fertility preg/hx of fetal renal anomaly: pt has targeted u/s scheduled with [facility]    Hx cholestasis: check baseline bile acids today. Pt had normal AST/ALT earlier. She is aware to report symptoms promptly    Rh neg: pt aware of rhogam indications    rtc 3 weeks or prn

## 2020-11-11 LAB
BASOPHILS # BLD AUTO: 0.06 10*3/MM3 (ref 0–0.2)
BASOPHILS NFR BLD AUTO: 0.7 % (ref 0–1.5)
BILE AC SERPL-SCNC: 3.3 UMOL/L (ref 0–10)
EOSINOPHIL # BLD AUTO: 0.11 10*3/MM3 (ref 0–0.4)
EOSINOPHIL NFR BLD AUTO: 1.2 % (ref 0.3–6.2)
ERYTHROCYTE [DISTWIDTH] IN BLOOD BY AUTOMATED COUNT: 12.6 % (ref 12.3–15.4)
HCT VFR BLD AUTO: 37.2 % (ref 34–46.6)
HGB BLD-MCNC: 12.4 G/DL (ref 12–15.9)
IMM GRANULOCYTES # BLD AUTO: 0.04 10*3/MM3 (ref 0–0.05)
IMM GRANULOCYTES NFR BLD AUTO: 0.4 % (ref 0–0.5)
LYMPHOCYTES # BLD AUTO: 2.38 10*3/MM3 (ref 0.7–3.1)
LYMPHOCYTES NFR BLD AUTO: 26.7 % (ref 19.6–45.3)
MCH RBC QN AUTO: 29.8 PG (ref 26.6–33)
MCHC RBC AUTO-ENTMCNC: 33.3 G/DL (ref 31.5–35.7)
MCV RBC AUTO: 89.4 FL (ref 79–97)
MONOCYTES # BLD AUTO: 0.6 10*3/MM3 (ref 0.1–0.9)
MONOCYTES NFR BLD AUTO: 6.7 % (ref 5–12)
NEUTROPHILS # BLD AUTO: 5.71 10*3/MM3 (ref 1.7–7)
NEUTROPHILS NFR BLD AUTO: 64.3 % (ref 42.7–76)
NRBC BLD AUTO-RTO: 0 /100 WBC (ref 0–0.2)
PLATELET # BLD AUTO: 246 10*3/MM3 (ref 140–450)
RBC # BLD AUTO: 4.16 10*6/MM3 (ref 3.77–5.28)
WBC # BLD AUTO: 8.9 10*3/MM3 (ref 3.4–10.8)

## 2020-11-12 ENCOUNTER — TELEPHONE (OUTPATIENT)
Dept: OBSTETRICS AND GYNECOLOGY | Age: 35
End: 2020-11-12

## 2020-11-12 NOTE — TELEPHONE ENCOUNTER
----- Message from Rea Stone MD sent at 11/11/2020  9:22 PM EST -----  Please call Carey, her bile acids and CBC results are normal

## 2020-11-25 ENCOUNTER — TELEPHONE (OUTPATIENT)
Dept: OBSTETRICS AND GYNECOLOGY | Age: 35
End: 2020-11-25

## 2020-11-25 NOTE — TELEPHONE ENCOUNTER
I spoke with patient in regards to labs ordered 11/5/20.  Pt says she does not want to have this labwork @ this time.

## 2020-12-01 ENCOUNTER — ROUTINE PRENATAL (OUTPATIENT)
Dept: OBSTETRICS AND GYNECOLOGY | Age: 35
End: 2020-12-01

## 2020-12-01 VITALS — WEIGHT: 174 LBS | DIASTOLIC BLOOD PRESSURE: 58 MMHG | SYSTOLIC BLOOD PRESSURE: 120 MMHG | BODY MASS INDEX: 27.25 KG/M2

## 2020-12-01 DIAGNOSIS — Z36.1 NEED FOR MATERNAL SERUM ALPHA-PROTEIN (MSAFP) SCREENING: ICD-10-CM

## 2020-12-01 DIAGNOSIS — Z3A.16 16 WEEKS GESTATION OF PREGNANCY: Primary | ICD-10-CM

## 2020-12-01 LAB
CLARITY, POC: CLEAR
COLOR UR: YELLOW
GLUCOSE UR STRIP-MCNC: NEGATIVE MG/DL
PROT UR STRIP-MCNC: NEGATIVE MG/DL

## 2020-12-01 PROCEDURE — 0502F SUBSEQUENT PRENATAL CARE: CPT | Performed by: OBSTETRICS & GYNECOLOGY

## 2020-12-01 NOTE — PROGRESS NOTES
Pt presents for routine visit. She denies any issues today.    A/p: 16 weeks: will check AFP today, f/u 3 weeks     Prior child with absent kidney/fertility preg: pt has targeted u/s scheduled with [facility] for anatomy    Antithrombin III def: pt on lovenox. She decided to stay at daily dosing for now but will increase to twice daily dosing at 20 weeks    Hx preeclampsia: pt on baby asa. Recommend she start monitoring BP regularly.     Hx cholestasis: baseline bile acids were normal. Pt aware of symptoms to report.

## 2020-12-03 ENCOUNTER — TELEPHONE (OUTPATIENT)
Dept: OBSTETRICS AND GYNECOLOGY | Age: 35
End: 2020-12-03

## 2020-12-03 LAB
AFP ADJ MOM SERPL: 0.9
AFP INTERP SERPL-IMP: NORMAL
AFP INTERP SERPL-IMP: NORMAL
AFP SERPL-MCNC: 28.7 NG/ML
AGE AT DELIVERY: 34.1 YR
GA METHOD: NORMAL
GA: 16.4 WEEKS
IDDM PATIENT QL: NO
LABORATORY COMMENT REPORT: NORMAL
MULTIPLE PREGNANCY: NO
NEURAL TUBE DEFECT RISK FETUS: NORMAL %
RESULT: NORMAL

## 2020-12-03 NOTE — TELEPHONE ENCOUNTER
----- Message from Rea Stone MD sent at 12/3/2020  7:07 AM EST -----  Please call Carey, her AFP is negative/normal

## 2020-12-24 ENCOUNTER — ROUTINE PRENATAL (OUTPATIENT)
Dept: OBSTETRICS AND GYNECOLOGY | Age: 35
End: 2020-12-24

## 2020-12-24 VITALS — BODY MASS INDEX: 27.44 KG/M2 | DIASTOLIC BLOOD PRESSURE: 60 MMHG | WEIGHT: 175.2 LBS | SYSTOLIC BLOOD PRESSURE: 108 MMHG

## 2020-12-24 DIAGNOSIS — Z3A.19 19 WEEKS GESTATION OF PREGNANCY: Primary | ICD-10-CM

## 2020-12-24 PROCEDURE — 0502F SUBSEQUENT PRENATAL CARE: CPT | Performed by: OBSTETRICS & GYNECOLOGY

## 2020-12-24 NOTE — PROGRESS NOTES
Pt presents for routine visit and has no issues today. She has noted occ round ligament discomfort but this improves with wearing support belt. She denies vag bleeding or pelvic pain    A/p: antithrombin III def: pt is on lovenox and baby asa. She plans to increase lovenox to twice daily per MFM recommendations at 20 weeks    Fertility preg/prior child with absent kidney: pt has targeted anatomy u/s scheduled with [facility]    Hx cholestasis: no current symptoms. Pt aware to report    Pt had questions regarding covid vaccination as she will be offered at work. Reviewed statements from ACOG that vaccine is not studied in pregnancy but due to risks of infection, pt can proceed if she chooses. Pt will consider further.    rtc 3 weeks

## 2020-12-30 ENCOUNTER — APPOINTMENT (OUTPATIENT)
Dept: ULTRASOUND IMAGING | Facility: HOSPITAL | Age: 35
End: 2020-12-30

## 2021-01-07 ENCOUNTER — OFFICE VISIT (OUTPATIENT)
Dept: OBSTETRICS AND GYNECOLOGY | Facility: CLINIC | Age: 36
End: 2021-01-07

## 2021-01-07 ENCOUNTER — HOSPITAL ENCOUNTER (OUTPATIENT)
Dept: ULTRASOUND IMAGING | Facility: HOSPITAL | Age: 36
Discharge: HOME | End: 2021-01-07
Admitting: OBSTETRICS & GYNECOLOGY

## 2021-01-07 ENCOUNTER — TRANSCRIBE ORDERS (OUTPATIENT)
Dept: ULTRASOUND IMAGING | Facility: HOSPITAL | Age: 36
End: 2021-01-07

## 2021-01-07 VITALS
HEIGHT: 67 IN | TEMPERATURE: 97.5 F | WEIGHT: 181 LBS | HEART RATE: 82 BPM | SYSTOLIC BLOOD PRESSURE: 118 MMHG | DIASTOLIC BLOOD PRESSURE: 65 MMHG | BODY MASS INDEX: 28.41 KG/M2

## 2021-01-07 DIAGNOSIS — D68.59 ANTITHROMBIN 3 DEFICIENCY (HCC): ICD-10-CM

## 2021-01-07 DIAGNOSIS — Z82.79 FAMILY HISTORY OF CONGENITAL ANOMALY: ICD-10-CM

## 2021-01-07 DIAGNOSIS — D68.59 ANTITHROMBIN 3 DEFICIENCY (HCC): Primary | ICD-10-CM

## 2021-01-07 DIAGNOSIS — O09.299 HX OF PREECLAMPSIA, PRIOR PREGNANCY, CURRENTLY PREGNANT: ICD-10-CM

## 2021-01-07 DIAGNOSIS — O09.819 PREGNANCY RESULTING FROM ASSISTED REPRODUCTIVE TECHNOLOGY, ANTEPARTUM: ICD-10-CM

## 2021-01-07 DIAGNOSIS — Z98.891 PREVIOUS CESAREAN SECTION: ICD-10-CM

## 2021-01-07 PROCEDURE — 76811 OB US DETAILED SNGL FETUS: CPT | Performed by: OBSTETRICS & GYNECOLOGY

## 2021-01-07 PROCEDURE — 76811 OB US DETAILED SNGL FETUS: CPT

## 2021-01-07 PROCEDURE — 99214 OFFICE O/P EST MOD 30 MIN: CPT | Performed by: OBSTETRICS & GYNECOLOGY

## 2021-01-07 NOTE — PROGRESS NOTES
"MATERNAL FETAL MEDICINE OUTPATIENT NOTE     Dear Dr Stone    This is a  followup visit.     Thank you for requesting my service to provide consultation for Carey Armando is a 35 y.o.   at  21 5/7 weeks gestation (Estimated Date of Delivery: 5/15/21).   She is being seen for:followup fetal growth   On anticoagulation.     Today, she denies headache, blurry vision, RUQ pain. No vaginal bleeding, no contractions.     Chief complaint    ICD-10-CM ICD-9-CM   1. Antithrombin 3 deficiency (CMS/[facility])  D68.59 289.81   2. Pregnancy resulting from assisted reproductive technology, antepartum  O09.819 V23.85   3. Family history of congenital anomaly  Z82.79 V19.5   4. Hx of preeclampsia, prior pregnancy, currently pregnant  O09.299 V23.49   5. Previous  section  Z98.891 V45.89       Past obstetric, gynecological, medical, surgical, family and social history reviewed; no changes made.     Review of systems  Constitutional:  No Weight Change, No Fever, No Chills, No Fatigue, No Malaise  ENT/Mouth:  No Hearing Changes, No Sinus Pain, No Hoarseness, No sore throat, No   Eyes:  No Eye Pain, No Vision Changes  Cardiovascular:  No Chest Pain, No SOB, No Palpitations  Respiratory:  No Cough, No Wheezing, No Dyspnea  Gastrointestinal:  No Nausea, No Vomiting, No Diarrhea, No Constipation, No Pain   Genitourinary:   No Dysuria, No Urinary Frequency, No Hematuria, No Flank Pain  Musculoskeletal:  No Arthralgias, No Myalgias,   Skin:  No Skin Lesions, No Pruritis,   Neuro:  No Weakness, No Numbness, No Loss of Consciousness, No Syncope  Psych:  No Memory Changes, No Violence/Abuse Hx., No Eating Concerns  Heme/Lymph:  No Bruising, No Bleeding  Endocrine:   No Temperature Intolerance    Vitals:    21 0902   BP: 118/65   BP Location: Right arm   Patient Position: Sitting   Cuff Size: Adult   Pulse: 82   Temp: 97.5 °F (36.4 °C)   TempSrc: Infrared   Weight: 82.1 kg (181 lb)   Height: 170.2 cm (67\")       PHYSICAL EXAM "   GENERAL: Not in acute distress, gravid   NEURO: awake, alert and oriented to person, place, and time  ABDOMINAL: No fundal tenderness, no rebound or guarding   EXTREMITIES: Bilaterally lower extremities No edema, no tenderness  PELVIC: No obvious vaginal discharge, no bleeding    ULTRASOUND   Please view full ultrasound note on Imaging tab in ViewPoint.      ASSESSMENT   35 y.o.   at  21 5/7 weeks gestation (Estimated Date of Delivery: 5/15/21), reassuring fetal and maternal status.     1. Single live intrauterine pregnancy   [ X ] stable  [   ] improving [  ] worsening    2. Inherited Thrombophilias - Antithrombin III deficiency. - see prior consultation   [ X ] stable  [   ] improving [  ] worsening      3. Genetic testing   [ X ] stable  [   ] improving [  ] worsening     NIPT low risk - reviewed with patient      4. In vitro fertilization -  see prior consultation   [ X ] stable  [   ] improving [  ] worsening  Evidence based medicine has suggested that infants conceived through IVF have a slightly higher rate of major birth defects. [Moon CK,Fertil Steril 2005; 84:3007-6282.]  Patient declines fetal echocardiogram and will let Pediatrics after delivery determine if more extensive  workup is warranted.      5. History of preeclampsia -  see prior consultation   [ X ] stable  [   ] improving [  ] worsening       6. Previous  delivery - no previa   [ X ] stable  [   ] improving [  ] worsening       PLAN   -Serial growth ultrasounds every 4 - 6 weeks by primary OB   -Starting at 28 weeks: Fetal movement instructions given continue daily until delivery; instructed to report to labor and delivery if cannot achieve more than 10 kicks in one hour or if she perceives a decrease in fetal movement      ANTICOAGULATION MANAGEMENT   Continue prophylactic  Enoxaparin and daily 81mg aspirin daily - until 35 - 36 weeks  At 35 - 36 weeks STOP aspirin   At 35 - 36 weeks gestation:   Stop   Enoxaparin  Start Unfractionated heparin 10,000 units SC every 12 hours until planned delivery.   STOP anticoagulation at any signs of labor      This note has been routed to the referring obstetricians/medical provider.   Thank you for your clinical consult.     CHRIS WALLACE MD MPH FACOG  MATERNAL FETAL MEDICINE

## 2021-01-12 ENCOUNTER — PREP FOR SURGERY (OUTPATIENT)
Dept: OTHER | Facility: HOSPITAL | Age: 36
End: 2021-01-12

## 2021-01-12 ENCOUNTER — ROUTINE PRENATAL (OUTPATIENT)
Dept: OBSTETRICS AND GYNECOLOGY | Age: 36
End: 2021-01-12

## 2021-01-12 VITALS — WEIGHT: 179.6 LBS | DIASTOLIC BLOOD PRESSURE: 60 MMHG | BODY MASS INDEX: 28.13 KG/M2 | SYSTOLIC BLOOD PRESSURE: 100 MMHG

## 2021-01-12 DIAGNOSIS — Z3A.22 22 WEEKS GESTATION OF PREGNANCY: Primary | ICD-10-CM

## 2021-01-12 DIAGNOSIS — O09.299 HX OF PREECLAMPSIA, PRIOR PREGNANCY, CURRENTLY PREGNANT: Primary | ICD-10-CM

## 2021-01-12 DIAGNOSIS — O09.299 HX OF PREECLAMPSIA, PRIOR PREGNANCY, CURRENTLY PREGNANT: ICD-10-CM

## 2021-01-12 DIAGNOSIS — D68.59 ANTITHROMBIN 3 DEFICIENCY (HCC): ICD-10-CM

## 2021-01-12 PROCEDURE — 0502F SUBSEQUENT PRENATAL CARE: CPT | Performed by: OBSTETRICS & GYNECOLOGY

## 2021-01-12 NOTE — PROGRESS NOTES
Pt presents for f/u visit. She reports occasional headaches but not severe. She denies vision changes or abd pain. She notes active fetal movement. She denies reg ctx, vag bleeding or leaking fluid    A/p: 22 weeks with antithrombin III def: she is taking lovenox twice daily; will check cbc today for platelets and repeat CMP due to hx    Hx cholestasis: no symptoms currently; pt will report    Rh neg: plan rhogam at 28 weeks or prn    F/u 3 weeks with one hour    Reviewed u/s and MFM consult, small fibroid noted and marginal cord insertion, will plan growth u/s's

## 2021-01-13 ENCOUNTER — TELEPHONE (OUTPATIENT)
Dept: OBSTETRICS AND GYNECOLOGY | Age: 36
End: 2021-01-13

## 2021-01-13 LAB
ALBUMIN SERPL-MCNC: 3.8 G/DL (ref 3.5–5.2)
ALBUMIN/GLOB SERPL: 1.5 G/DL
ALP SERPL-CCNC: 48 U/L (ref 39–117)
ALT SERPL-CCNC: 12 U/L (ref 1–33)
AST SERPL-CCNC: 15 U/L (ref 1–32)
BASOPHILS # BLD AUTO: 0.05 10*3/MM3 (ref 0–0.2)
BASOPHILS NFR BLD AUTO: 0.5 % (ref 0–1.5)
BILIRUB SERPL-MCNC: 0.2 MG/DL (ref 0–1.2)
BUN SERPL-MCNC: 7 MG/DL (ref 6–20)
BUN/CREAT SERPL: 11.9 (ref 7–25)
CALCIUM SERPL-MCNC: 9.3 MG/DL (ref 8.6–10.5)
CHLORIDE SERPL-SCNC: 103 MMOL/L (ref 98–107)
CO2 SERPL-SCNC: 25.1 MMOL/L (ref 22–29)
CREAT SERPL-MCNC: 0.59 MG/DL (ref 0.57–1)
EOSINOPHIL # BLD AUTO: 0.1 10*3/MM3 (ref 0–0.4)
EOSINOPHIL NFR BLD AUTO: 1 % (ref 0.3–6.2)
ERYTHROCYTE [DISTWIDTH] IN BLOOD BY AUTOMATED COUNT: 13.1 % (ref 12.3–15.4)
GLOBULIN SER CALC-MCNC: 2.6 GM/DL
GLUCOSE SERPL-MCNC: 87 MG/DL (ref 65–99)
HCT VFR BLD AUTO: 36 % (ref 34–46.6)
HGB BLD-MCNC: 12.2 G/DL (ref 12–15.9)
IMM GRANULOCYTES # BLD AUTO: 0.12 10*3/MM3 (ref 0–0.05)
IMM GRANULOCYTES NFR BLD AUTO: 1.2 % (ref 0–0.5)
LYMPHOCYTES # BLD AUTO: 2.49 10*3/MM3 (ref 0.7–3.1)
LYMPHOCYTES NFR BLD AUTO: 25.4 % (ref 19.6–45.3)
MCH RBC QN AUTO: 30.4 PG (ref 26.6–33)
MCHC RBC AUTO-ENTMCNC: 33.9 G/DL (ref 31.5–35.7)
MCV RBC AUTO: 89.8 FL (ref 79–97)
MONOCYTES # BLD AUTO: 0.71 10*3/MM3 (ref 0.1–0.9)
MONOCYTES NFR BLD AUTO: 7.3 % (ref 5–12)
NEUTROPHILS # BLD AUTO: 6.32 10*3/MM3 (ref 1.7–7)
NEUTROPHILS NFR BLD AUTO: 64.6 % (ref 42.7–76)
NRBC BLD AUTO-RTO: 0 /100 WBC (ref 0–0.2)
PLATELET # BLD AUTO: 260 10*3/MM3 (ref 140–450)
POTASSIUM SERPL-SCNC: 4.5 MMOL/L (ref 3.5–5.2)
PROT SERPL-MCNC: 6.4 G/DL (ref 6–8.5)
RBC # BLD AUTO: 4.01 10*6/MM3 (ref 3.77–5.28)
SODIUM SERPL-SCNC: 137 MMOL/L (ref 136–145)
WBC # BLD AUTO: 9.79 10*3/MM3 (ref 3.4–10.8)

## 2021-01-13 NOTE — TELEPHONE ENCOUNTER
----- Message from Rea Stone MD sent at 1/13/2021 10:45 AM EST -----  Please call Carey, her platelets are normal at 260, her AST and ALT values are normal as well.

## 2021-02-02 ENCOUNTER — ROUTINE PRENATAL (OUTPATIENT)
Dept: OBSTETRICS AND GYNECOLOGY | Age: 36
End: 2021-02-02

## 2021-02-02 VITALS — DIASTOLIC BLOOD PRESSURE: 58 MMHG | BODY MASS INDEX: 28.13 KG/M2 | WEIGHT: 179.6 LBS | SYSTOLIC BLOOD PRESSURE: 102 MMHG

## 2021-02-02 DIAGNOSIS — Z67.91 RH NEGATIVE STATE IN ANTEPARTUM PERIOD: ICD-10-CM

## 2021-02-02 DIAGNOSIS — Z13.0 SCREENING FOR IRON DEFICIENCY ANEMIA: ICD-10-CM

## 2021-02-02 DIAGNOSIS — Z3A.25 25 WEEKS GESTATION OF PREGNANCY: Primary | ICD-10-CM

## 2021-02-02 DIAGNOSIS — O26.899 RH NEGATIVE STATE IN ANTEPARTUM PERIOD: ICD-10-CM

## 2021-02-02 DIAGNOSIS — Z13.1 SCREENING FOR DIABETES MELLITUS: ICD-10-CM

## 2021-02-02 PROCEDURE — 0502F SUBSEQUENT PRENATAL CARE: CPT | Performed by: OBSTETRICS & GYNECOLOGY

## 2021-02-02 NOTE — PROGRESS NOTES
Pt presents for routine visit. She denies any issues today. She notes active fetal movement. She denies ctx, vag bleeding or leaking fluid    A/p: 25 weeks with antithrombin III def: pt is on lovenox prophylaxis 40 mg SC twice daily. She will switch to heparin in 3rd trimester or prn. She is also on baby asa    Hx cholestasis: pt denies any symptoms currently, she is aware to report symptoms if noted    Hx preeclampsia: pt is monitoring BP at home and will continue; reviewed preeclamptic warnings    Rh neg: ab screen ordered today, plan rhogam at 28 weeks    One hour today with cbc, reviewed PTL warnings    Hx marginal cord insertion/fibroid: plan growth u/s Q 4 to 6 weeks per MFM, pt has u/s scheduled in MFM in 2 weeks

## 2021-02-03 LAB
BASOPHILS # BLD AUTO: 0.05 10*3/MM3 (ref 0–0.2)
BASOPHILS NFR BLD AUTO: 0.5 % (ref 0–1.5)
BLD GP AB SCN SERPL QL: NEGATIVE
EOSINOPHIL # BLD AUTO: 0.09 10*3/MM3 (ref 0–0.4)
EOSINOPHIL NFR BLD AUTO: 0.8 % (ref 0.3–6.2)
ERYTHROCYTE [DISTWIDTH] IN BLOOD BY AUTOMATED COUNT: 13.1 % (ref 12.3–15.4)
GLUCOSE 1H P 50 G GLC PO SERPL-MCNC: 103 MG/DL (ref 65–139)
HCT VFR BLD AUTO: 33.1 % (ref 34–46.6)
HGB BLD-MCNC: 11.4 G/DL (ref 12–15.9)
IMM GRANULOCYTES # BLD AUTO: 0.11 10*3/MM3 (ref 0–0.05)
IMM GRANULOCYTES NFR BLD AUTO: 1 % (ref 0–0.5)
LYMPHOCYTES # BLD AUTO: 2.4 10*3/MM3 (ref 0.7–3.1)
LYMPHOCYTES NFR BLD AUTO: 22.6 % (ref 19.6–45.3)
MCH RBC QN AUTO: 30.6 PG (ref 26.6–33)
MCHC RBC AUTO-ENTMCNC: 34.4 G/DL (ref 31.5–35.7)
MCV RBC AUTO: 88.7 FL (ref 79–97)
MONOCYTES # BLD AUTO: 0.66 10*3/MM3 (ref 0.1–0.9)
MONOCYTES NFR BLD AUTO: 6.2 % (ref 5–12)
NEUTROPHILS # BLD AUTO: 7.29 10*3/MM3 (ref 1.7–7)
NEUTROPHILS NFR BLD AUTO: 68.9 % (ref 42.7–76)
NRBC BLD AUTO-RTO: 0 /100 WBC (ref 0–0.2)
PLATELET # BLD AUTO: 238 10*3/MM3 (ref 140–450)
RBC # BLD AUTO: 3.73 10*6/MM3 (ref 3.77–5.28)
WBC # BLD AUTO: 10.6 10*3/MM3 (ref 3.4–10.8)

## 2021-02-18 ENCOUNTER — HOSPITAL ENCOUNTER (OUTPATIENT)
Dept: ULTRASOUND IMAGING | Facility: HOSPITAL | Age: 36
Discharge: HOME | End: 2021-02-18
Admitting: OBSTETRICS & GYNECOLOGY

## 2021-02-18 VITALS
DIASTOLIC BLOOD PRESSURE: 70 MMHG | TEMPERATURE: 97.8 F | BODY MASS INDEX: 28.56 KG/M2 | HEIGHT: 67 IN | HEART RATE: 87 BPM | WEIGHT: 182 LBS | SYSTOLIC BLOOD PRESSURE: 117 MMHG

## 2021-02-18 DIAGNOSIS — O09.819 PREGNANCY RESULTING FROM ASSISTED REPRODUCTIVE TECHNOLOGY, ANTEPARTUM: ICD-10-CM

## 2021-02-18 DIAGNOSIS — D68.59 ANTITHROMBIN 3 DEFICIENCY (HCC): ICD-10-CM

## 2021-02-18 DIAGNOSIS — O09.299 HX OF PREECLAMPSIA, PRIOR PREGNANCY, CURRENTLY PREGNANT: ICD-10-CM

## 2021-02-18 PROCEDURE — 76816 OB US FOLLOW-UP PER FETUS: CPT

## 2021-02-18 RX ORDER — FERROUS SULFATE 325(65) MG
325 TABLET ORAL
COMMUNITY
End: 2021-04-22 | Stop reason: HOSPADM

## 2021-02-23 ENCOUNTER — ROUTINE PRENATAL (OUTPATIENT)
Dept: OBSTETRICS AND GYNECOLOGY | Age: 36
End: 2021-02-23

## 2021-02-23 VITALS — DIASTOLIC BLOOD PRESSURE: 56 MMHG | WEIGHT: 184.8 LBS | SYSTOLIC BLOOD PRESSURE: 100 MMHG | BODY MASS INDEX: 28.94 KG/M2

## 2021-02-23 DIAGNOSIS — O26.899 RH NEGATIVE STATE IN ANTEPARTUM PERIOD: ICD-10-CM

## 2021-02-23 DIAGNOSIS — Z67.91 RH NEGATIVE STATE IN ANTEPARTUM PERIOD: ICD-10-CM

## 2021-02-23 DIAGNOSIS — O99.019 MATERNAL ANEMIA IN PREGNANCY, ANTEPARTUM: ICD-10-CM

## 2021-02-23 DIAGNOSIS — Z3A.28 28 WEEKS GESTATION OF PREGNANCY: Primary | ICD-10-CM

## 2021-02-23 PROCEDURE — 0502F SUBSEQUENT PRENATAL CARE: CPT | Performed by: OBSTETRICS & GYNECOLOGY

## 2021-02-23 PROCEDURE — 96372 THER/PROPH/DIAG INJ SC/IM: CPT | Performed by: OBSTETRICS & GYNECOLOGY

## 2021-02-23 NOTE — PROGRESS NOTES
Pt presents for routine visit. She reports occ mild headaches. She denies vision changes and BP has remained normal with home checks. She reports active fetal movement. 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 Had recent normal growth with MFM last week. Continue lovenox for now with change to heparin prn or at term. Will start BPP's at 32 weeks. MFM plans at [facility]    Hx preeclampsia: pt on baby asa, advise she continue to monitor BP at home. Reviewed preeclamptic warnings.     Hx of cholestasis: pt denies current symptoms. Advise she call with symptoms.    Hx c/s: pt plans repeat    Mild anemia: check ferritin today. Pt on iron supplement.    rtc 1 week

## 2021-02-24 ENCOUNTER — TELEPHONE (OUTPATIENT)
Dept: OBSTETRICS AND GYNECOLOGY | Age: 36
End: 2021-02-24

## 2021-02-24 LAB
BLD GP AB SCN SERPL QL: NEGATIVE
FERRITIN SERPL-MCNC: 34.3 NG/ML (ref 13–150)

## 2021-02-24 NOTE — TELEPHONE ENCOUNTER
----- Message from Rea Stone MD sent at 2/24/2021  9:04 AM EST -----  Please call Carey, her ab screen is negative. Her ferritin is low normal so would continue iron supplement.

## 2021-03-02 ENCOUNTER — ROUTINE PRENATAL (OUTPATIENT)
Dept: OBSTETRICS AND GYNECOLOGY | Age: 36
End: 2021-03-02

## 2021-03-02 VITALS — DIASTOLIC BLOOD PRESSURE: 60 MMHG | SYSTOLIC BLOOD PRESSURE: 100 MMHG | BODY MASS INDEX: 29.16 KG/M2 | WEIGHT: 186.2 LBS

## 2021-03-02 DIAGNOSIS — Z3A.29 29 WEEKS GESTATION OF PREGNANCY: Primary | ICD-10-CM

## 2021-03-02 PROCEDURE — 90715 TDAP VACCINE 7 YRS/> IM: CPT | Performed by: OBSTETRICS & GYNECOLOGY

## 2021-03-02 PROCEDURE — 0502F SUBSEQUENT PRENATAL CARE: CPT | Performed by: OBSTETRICS & GYNECOLOGY

## 2021-03-02 PROCEDURE — 90471 IMMUNIZATION ADMIN: CPT | Performed by: OBSTETRICS & GYNECOLOGY

## 2021-03-02 NOTE — PROGRESS NOTES
Pt presents for routine visit. She denies any issues today. BP values at home are always normal under 120/80. She denies any severe headache, vision changes or abd pain. She notes active fetal movement.     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 Will continue for now and change to lovenox near term or if delivery expected. Pt advised by [facility] to f/u there around 32 weeks but not yet booked. Will continue weekly f/u here for BP surveillance and repeat growth with u/s in 2 weeks. Reviewed preeclamptic warnings and continue home BP monitoring    Rh neg: had rhogam last visit    Hx cholestasis: no symptoms at this time    Hx of c/s: pt plans repeat

## 2021-03-08 ENCOUNTER — ROUTINE PRENATAL (OUTPATIENT)
Dept: OBSTETRICS AND GYNECOLOGY | Age: 36
End: 2021-03-08

## 2021-03-08 VITALS — WEIGHT: 186.4 LBS | DIASTOLIC BLOOD PRESSURE: 60 MMHG | SYSTOLIC BLOOD PRESSURE: 102 MMHG | BODY MASS INDEX: 29.19 KG/M2

## 2021-03-08 DIAGNOSIS — D68.59 ANTITHROMBIN 3 DEFICIENCY (HCC): ICD-10-CM

## 2021-03-08 DIAGNOSIS — Z3A.30 30 WEEKS GESTATION OF PREGNANCY: Primary | ICD-10-CM

## 2021-03-08 DIAGNOSIS — K83.1 CHOLESTASIS DURING PREGNANCY IN THIRD TRIMESTER: ICD-10-CM

## 2021-03-08 DIAGNOSIS — O26.613 CHOLESTASIS DURING PREGNANCY IN THIRD TRIMESTER: ICD-10-CM

## 2021-03-08 DIAGNOSIS — O09.299 HX OF PREECLAMPSIA, PRIOR PREGNANCY, CURRENTLY PREGNANT: ICD-10-CM

## 2021-03-08 LAB
CLARITY, POC: CLEAR
COLOR UR: YELLOW
GLUCOSE UR STRIP-MCNC: NEGATIVE MG/DL
PROT UR STRIP-MCNC: ABNORMAL MG/DL

## 2021-03-08 PROCEDURE — 0502F SUBSEQUENT PRENATAL CARE: CPT | Performed by: OBSTETRICS & GYNECOLOGY

## 2021-03-08 NOTE — PROGRESS NOTES
Pt presents for routine visit. She reports she felt more tired and had some increase in reflux symptoms over the weekend. BP's remained normal staying below 120/80. She denies heacache or vision changes. She notes minimal swelling. She reports active fetal movement. She reports rare ctx but not regular. No bleeding/leaking  O: ext: trace edema, no cords or tenderness; 2+ DTR's    A/p: antithrombin III def with hx preeclampsia: pt on lovenox 40 mg twice daily. Plan per MF is change to heparin 10,000 units at 35 weeks or prn. Reviewed preeclamptic warnings. Will check CBC and CMP today due to symptoms over weekend. Pt reports she currently feeling well. She will continue home BP monitoring and is aware to call if values increase. Will start  surveillance with weekly BPP next week. [facility] recommended f/u there at 32 weeks. Pt reports she called the [facility] but they would not book without another consult. Orders placed. Continue daily fmc's.    Cholestasis: pt denies any significant itching other than occasional around ankles, will check bile acids with labs today. She is aware to call if she has significant symptoms even with normal testing    Rh neg: pt had rhogam at 28 weeks    Hx c/s: repeat planned at 38 weeks or prn

## 2021-03-09 LAB
ALBUMIN SERPL-MCNC: 3.8 G/DL (ref 3.5–5.2)
ALBUMIN/GLOB SERPL: 1.5 G/DL
ALP SERPL-CCNC: 69 U/L (ref 39–117)
ALT SERPL-CCNC: 13 U/L (ref 1–33)
AST SERPL-CCNC: 15 U/L (ref 1–32)
BASOPHILS # BLD AUTO: 0.07 10*3/MM3 (ref 0–0.2)
BASOPHILS NFR BLD AUTO: 0.6 % (ref 0–1.5)
BILE AC SERPL-SCNC: 1.8 UMOL/L (ref 0–10)
BILIRUB SERPL-MCNC: 0.2 MG/DL (ref 0–1.2)
BUN SERPL-MCNC: 7 MG/DL (ref 6–20)
BUN/CREAT SERPL: 12.7 (ref 7–25)
CALCIUM SERPL-MCNC: 9.1 MG/DL (ref 8.6–10.5)
CHLORIDE SERPL-SCNC: 102 MMOL/L (ref 98–107)
CO2 SERPL-SCNC: 25.1 MMOL/L (ref 22–29)
CREAT SERPL-MCNC: 0.55 MG/DL (ref 0.57–1)
EOSINOPHIL # BLD AUTO: 0.13 10*3/MM3 (ref 0–0.4)
EOSINOPHIL NFR BLD AUTO: 1 % (ref 0.3–6.2)
ERYTHROCYTE [DISTWIDTH] IN BLOOD BY AUTOMATED COUNT: 13.1 % (ref 12.3–15.4)
GLOBULIN SER CALC-MCNC: 2.5 GM/DL
GLUCOSE SERPL-MCNC: 83 MG/DL (ref 65–99)
HCT VFR BLD AUTO: 37.7 % (ref 34–46.6)
HGB BLD-MCNC: 12.7 G/DL (ref 12–15.9)
IMM GRANULOCYTES # BLD AUTO: 0.29 10*3/MM3 (ref 0–0.05)
IMM GRANULOCYTES NFR BLD AUTO: 2.3 % (ref 0–0.5)
LYMPHOCYTES # BLD AUTO: 2.58 10*3/MM3 (ref 0.7–3.1)
LYMPHOCYTES NFR BLD AUTO: 20.7 % (ref 19.6–45.3)
MCH RBC QN AUTO: 30.6 PG (ref 26.6–33)
MCHC RBC AUTO-ENTMCNC: 33.7 G/DL (ref 31.5–35.7)
MCV RBC AUTO: 90.8 FL (ref 79–97)
MONOCYTES # BLD AUTO: 0.85 10*3/MM3 (ref 0.1–0.9)
MONOCYTES NFR BLD AUTO: 6.8 % (ref 5–12)
NEUTROPHILS # BLD AUTO: 8.52 10*3/MM3 (ref 1.7–7)
NEUTROPHILS NFR BLD AUTO: 68.6 % (ref 42.7–76)
NRBC BLD AUTO-RTO: 0 /100 WBC (ref 0–0.2)
PLATELET # BLD AUTO: 239 10*3/MM3 (ref 140–450)
POTASSIUM SERPL-SCNC: 4 MMOL/L (ref 3.5–5.2)
PROT SERPL-MCNC: 6.3 G/DL (ref 6–8.5)
RBC # BLD AUTO: 4.15 10*6/MM3 (ref 3.77–5.28)
SODIUM SERPL-SCNC: 137 MMOL/L (ref 136–145)
WBC # BLD AUTO: 12.44 10*3/MM3 (ref 3.4–10.8)

## 2021-03-10 ENCOUNTER — TELEPHONE (OUTPATIENT)
Dept: OBSTETRICS AND GYNECOLOGY | Age: 36
End: 2021-03-10

## 2021-03-10 NOTE — TELEPHONE ENCOUNTER
----- Message from Rea Stone MD sent at 3/10/2021  9:01 AM EST -----  Please call Carey, her platelets, AST/ALT and bile acids are all normal

## 2021-03-16 ENCOUNTER — ROUTINE PRENATAL (OUTPATIENT)
Dept: OBSTETRICS AND GYNECOLOGY | Age: 36
End: 2021-03-16

## 2021-03-16 VITALS — WEIGHT: 188.6 LBS | DIASTOLIC BLOOD PRESSURE: 68 MMHG | BODY MASS INDEX: 29.54 KG/M2 | SYSTOLIC BLOOD PRESSURE: 122 MMHG

## 2021-03-16 DIAGNOSIS — Z13.89 SCREENING FOR HEMATURIA OR PROTEINURIA: ICD-10-CM

## 2021-03-16 DIAGNOSIS — Z3A.31 31 WEEKS GESTATION OF PREGNANCY: Primary | ICD-10-CM

## 2021-03-16 PROCEDURE — 0502F SUBSEQUENT PRENATAL CARE: CPT | Performed by: OBSTETRICS & GYNECOLOGY

## 2021-03-16 RX ORDER — HEPARIN SODIUM 5000 [USP'U]/ML
10000 INJECTION, SOLUTION INTRAVENOUS; SUBCUTANEOUS EVERY 12 HOURS SCHEDULED
Qty: 120 ML | Refills: 0 | Status: SHIPPED | OUTPATIENT
Start: 2021-03-16 | End: 2021-04-22 | Stop reason: HOSPADM

## 2021-03-16 NOTE — PROGRESS NOTES
Pt presents for routine visit and growth u/s. She notes active fetal movement. She has been monitoring BP and highest value has been 116/75. She continues lovenox twice daily.   O: u/s with appropriate growth at 81 %, AC 83 %, BPP 8/8, swathi 14.9    A/p: antithrombin III def: continue lovenox 40 mg XC twice daily. Plan change to heparin 10,000 units twice daily starting 35 to 36 weeks per [facility] recommendations. Sent rx so pt will have ready for change. Pt also aware to hold anticoagulant with labor or preeclamptic symptoms. Will continue weekly BPP and daily fmc's.     Hx severe preeclampsia: BP's have remained stable. Plan continue home monitoring and preeclamptic warnings.     Hx of c/s: plan repeat at 38 weeks due to above issues    Rh neg: pt received rhogam at 28 weeks    Hx cholestasis: no current symptoms. Bile acids were normal with labs last week

## 2021-03-23 ENCOUNTER — ROUTINE PRENATAL (OUTPATIENT)
Dept: OBSTETRICS AND GYNECOLOGY | Age: 36
End: 2021-03-23

## 2021-03-23 ENCOUNTER — TELEPHONE (OUTPATIENT)
Dept: OBSTETRICS AND GYNECOLOGY | Age: 36
End: 2021-03-23

## 2021-03-23 VITALS — SYSTOLIC BLOOD PRESSURE: 110 MMHG | WEIGHT: 188.6 LBS | BODY MASS INDEX: 29.54 KG/M2 | DIASTOLIC BLOOD PRESSURE: 62 MMHG

## 2021-03-23 DIAGNOSIS — Z3A.32 32 WEEKS GESTATION OF PREGNANCY: Primary | ICD-10-CM

## 2021-03-23 DIAGNOSIS — D68.59 ANTITHROMBIN 3 DEFICIENCY (HCC): ICD-10-CM

## 2021-03-23 DIAGNOSIS — O09.299 HX OF PREECLAMPSIA, PRIOR PREGNANCY, CURRENTLY PREGNANT: ICD-10-CM

## 2021-03-23 PROCEDURE — 0502F SUBSEQUENT PRENATAL CARE: CPT | Performed by: OBSTETRICS & GYNECOLOGY

## 2021-03-23 NOTE — TELEPHONE ENCOUNTER
[facility] called stating that they do not have the 5000 unit of heprin in stock. They have 500 unit per .5 ml. They are asking what dose would you like the pt to take every 12 hours. Please advise.     [phone]

## 2021-03-23 NOTE — PROGRESS NOTES
Pt presents for routine visit. She notes active fetal movement. She is monitoring BP. She notes highest value 119/75. She has a mild headache this am but denies any severe pain. She denies vision changes or abdominal pain. She denies regular ctx, vag bleeding or leaking fluid.    A/p: antithrombin III def: pt is on lovenox 40 mg SC twice daily and will change to heparin 10,000 units SC twice daily at 35 weeks or prn. Orders have been sent for heparin. Pt had normal growth u/s last week and has a f/u u/s with MFM this week. Plan BPP there as pt will continue weekly BPP through delivery. Continue daily fmc's.     Hx preeclampsia: BP remains stable here and at home. Continue home BP monitoring and preeclamptic warnings. Pt advised to proceed to L&D with any symptoms. Will check CBC/CMP today for surveillance.     Rh neg: pt has received rhogam    Hx cholestasis: pt denies any recurrent symptoms    Hx c/s: plan repeat at 38 weeks or prn.

## 2021-03-24 ENCOUNTER — TELEPHONE (OUTPATIENT)
Dept: OBSTETRICS AND GYNECOLOGY | Age: 36
End: 2021-03-24

## 2021-03-24 LAB
ALBUMIN SERPL-MCNC: 3.8 G/DL (ref 3.5–5.2)
ALBUMIN/GLOB SERPL: 1.6 G/DL
ALP SERPL-CCNC: 75 U/L (ref 39–117)
ALT SERPL-CCNC: 14 U/L (ref 1–33)
AST SERPL-CCNC: 14 U/L (ref 1–32)
BASOPHILS # BLD AUTO: 0.07 10*3/MM3 (ref 0–0.2)
BASOPHILS NFR BLD AUTO: 0.6 % (ref 0–1.5)
BILIRUB SERPL-MCNC: 0.2 MG/DL (ref 0–1.2)
BUN SERPL-MCNC: 6 MG/DL (ref 6–20)
BUN/CREAT SERPL: 10.2 (ref 7–25)
CALCIUM SERPL-MCNC: 9.3 MG/DL (ref 8.6–10.5)
CHLORIDE SERPL-SCNC: 106 MMOL/L (ref 98–107)
CO2 SERPL-SCNC: 23.3 MMOL/L (ref 22–29)
CREAT SERPL-MCNC: 0.59 MG/DL (ref 0.57–1)
EOSINOPHIL # BLD AUTO: 0.1 10*3/MM3 (ref 0–0.4)
EOSINOPHIL NFR BLD AUTO: 0.8 % (ref 0.3–6.2)
ERYTHROCYTE [DISTWIDTH] IN BLOOD BY AUTOMATED COUNT: 13.1 % (ref 12.3–15.4)
GLOBULIN SER CALC-MCNC: 2.4 GM/DL
GLUCOSE SERPL-MCNC: 117 MG/DL (ref 65–99)
HCT VFR BLD AUTO: 37.6 % (ref 34–46.6)
HGB BLD-MCNC: 13.1 G/DL (ref 12–15.9)
IMM GRANULOCYTES # BLD AUTO: 0.39 10*3/MM3 (ref 0–0.05)
IMM GRANULOCYTES NFR BLD AUTO: 3.1 % (ref 0–0.5)
LYMPHOCYTES # BLD AUTO: 2.59 10*3/MM3 (ref 0.7–3.1)
LYMPHOCYTES NFR BLD AUTO: 20.6 % (ref 19.6–45.3)
MCH RBC QN AUTO: 31.7 PG (ref 26.6–33)
MCHC RBC AUTO-ENTMCNC: 34.8 G/DL (ref 31.5–35.7)
MCV RBC AUTO: 91 FL (ref 79–97)
MONOCYTES # BLD AUTO: 1.02 10*3/MM3 (ref 0.1–0.9)
MONOCYTES NFR BLD AUTO: 8.1 % (ref 5–12)
NEUTROPHILS # BLD AUTO: 8.42 10*3/MM3 (ref 1.7–7)
NEUTROPHILS NFR BLD AUTO: 66.8 % (ref 42.7–76)
NRBC BLD AUTO-RTO: 0 /100 WBC (ref 0–0.2)
PLATELET # BLD AUTO: 221 10*3/MM3 (ref 140–450)
POTASSIUM SERPL-SCNC: 4.3 MMOL/L (ref 3.5–5.2)
PROT SERPL-MCNC: 6.2 G/DL (ref 6–8.5)
RBC # BLD AUTO: 4.13 10*6/MM3 (ref 3.77–5.28)
SODIUM SERPL-SCNC: 139 MMOL/L (ref 136–145)
WBC # BLD AUTO: 12.59 10*3/MM3 (ref 3.4–10.8)

## 2021-03-24 NOTE — TELEPHONE ENCOUNTER
----- Message from Rea Stone MD sent at 3/24/2021  9:16 AM EDT -----  Please call Carey, her platelets and AST/ALT are normal

## 2021-03-25 ENCOUNTER — HOSPITAL ENCOUNTER (OUTPATIENT)
Dept: ULTRASOUND IMAGING | Facility: HOSPITAL | Age: 36
Discharge: HOME | End: 2021-03-25
Admitting: OBSTETRICS & GYNECOLOGY

## 2021-03-25 ENCOUNTER — OFFICE VISIT (OUTPATIENT)
Dept: OBSTETRICS AND GYNECOLOGY | Facility: CLINIC | Age: 36
End: 2021-03-25

## 2021-03-25 VITALS
SYSTOLIC BLOOD PRESSURE: 109 MMHG | HEIGHT: 67 IN | TEMPERATURE: 97.7 F | WEIGHT: 192 LBS | BODY MASS INDEX: 30.13 KG/M2 | DIASTOLIC BLOOD PRESSURE: 69 MMHG | HEART RATE: 77 BPM

## 2021-03-25 DIAGNOSIS — Z98.891 PREVIOUS CESAREAN SECTION: ICD-10-CM

## 2021-03-25 DIAGNOSIS — O09.299 HX OF PREECLAMPSIA, PRIOR PREGNANCY, CURRENTLY PREGNANT: ICD-10-CM

## 2021-03-25 DIAGNOSIS — Z36.89 ENCOUNTER FOR ULTRASOUND TO ASSESS FETAL GROWTH: ICD-10-CM

## 2021-03-25 DIAGNOSIS — O09.819 PREGNANCY RESULTING FROM ASSISTED REPRODUCTIVE TECHNOLOGY, ANTEPARTUM: ICD-10-CM

## 2021-03-25 DIAGNOSIS — D68.59 ANTITHROMBIN III DEFICIENCY (HCC): Primary | ICD-10-CM

## 2021-03-25 DIAGNOSIS — Z92.89 HISTORY OF FETAL BIOPHYSICAL PROFILE: ICD-10-CM

## 2021-03-25 DIAGNOSIS — Z87.19 HISTORY OF CHOLESTASIS DURING PREGNANCY: ICD-10-CM

## 2021-03-25 DIAGNOSIS — Z87.59 HISTORY OF CHOLESTASIS DURING PREGNANCY: ICD-10-CM

## 2021-03-25 PROCEDURE — 76819 FETAL BIOPHYS PROFIL W/O NST: CPT

## 2021-03-25 PROCEDURE — 76816 OB US FOLLOW-UP PER FETUS: CPT

## 2021-03-25 PROCEDURE — 99213 OFFICE O/P EST LOW 20 MIN: CPT | Performed by: OBSTETRICS & GYNECOLOGY

## 2021-03-25 PROCEDURE — 76819 FETAL BIOPHYS PROFIL W/O NST: CPT | Performed by: OBSTETRICS & GYNECOLOGY

## 2021-03-25 PROCEDURE — 76816 OB US FOLLOW-UP PER FETUS: CPT | Performed by: OBSTETRICS & GYNECOLOGY

## 2021-03-25 NOTE — PROGRESS NOTES
MATERNAL FETAL MEDICINE OUTPATIENT NOTE     Dear Dr Stone     This is a followup visit.     Thank you for requesting my service to provide consultation for Carey Armando is a 36 y.o.   at  32 5/7 weeks gestation (Estimated Date of Delivery: 5/15/21).   She is being seen for:fetal growth and followup.   Active fetus.     Prior notes from the patient's obstetrician were reviewed.     Today, she denies headache, blurry vision, RUQ pain. No vaginal bleeding, no contractions.     Chief complaint    ICD-10-CM ICD-9-CM   1. Antithrombin III deficiency (CMS/[facility])  D68.59 289.81   2. Hx of preeclampsia, iatrogenic  delivery - currently pregnant  O09.299 V23.49   3. History of cholestasis during pregnancy; Bile acid < 10 in this pregnancy   Z87.59 V23.49    Z87.19 V12.79   4. Pregnancy resulting from assisted reproductive technology, antepartum  O09.819 V23.85   5. History of fetal biophysical profile  Z92.89 V15.89   6. Encounter for ultrasound to assess fetal growth  Z36.89 V28.3   7. Previous  section  Z98.891 V45.89       Past obstetric, gynecological, medical, surgical, family and social history reviewed; no changes made.     Review of systems  Constitutional:  No Weight Change, No Fever, No Chills, No Fatigue, No Malaise  ENT/Mouth:  No Hearing Changes, No Sinus Pain, No Hoarseness, No sore throat, No   Eyes:  No Eye Pain, No Vision Changes  Cardiovascular:  No Chest Pain, No SOB, No Palpitations  Respiratory:  No Cough, No Wheezing, No Dyspnea  Gastrointestinal:  No Nausea, No Vomiting, No Diarrhea, No Constipation, No Pain   Genitourinary:   No Dysuria, No Urinary Frequency, No Hematuria, No Flank Pain  Musculoskeletal:  No Arthralgias, No Myalgias,   Skin:  No Skin Lesions, No Pruritis,   Neuro:  No Weakness, No Numbness, No Loss of Consciousness, No Syncope  Psych:  No Memory Changes, No Violence/Abuse Hx., No Eating Concerns  Heme/Lymph:  No Bruising, No Bleeding  Endocrine:   No Temperature  "Intolerance    Vitals:    21 1005   BP: 109/69   BP Location: Right arm   Patient Position: Sitting   Cuff Size: Adult   Pulse: 77   Temp: 97.7 °F (36.5 °C)   TempSrc: Infrared   Weight: 87.1 kg (192 lb)   Height: 170.2 cm (67\")       PHYSICAL EXAM   GENERAL: Not in acute distress, gravid   NEURO: awake, alert and oriented to person, place, and time  ABDOMINAL: No fundal tenderness, no rebound or guarding   EXTREMITIES: Bilaterally lower extremities No edema, no tenderness  PELVIC: No obvious vaginal discharge, no bleeding    ULTRASOUND   Please view full ultrasound note on Imaging tab in [employer].      ASSESSMENT   36 y.o.    at  32 5/7 weeks gestation (Estimated Date of Delivery: 5/15/21), reassuring fetal and maternal status.     1. Single live intrauterine pregnancy   [ X ] stable  [   ] improving [  ] worsening    2. Inherited Thrombophilias - Antithrombin III deficiency - see prior consultation   [ X ] stable  [   ] improving [  ] worsening      3. Genetic testing - NIPT low risk   [ X ] stable  [   ] improving [  ] worsening     4. In vitro fertilization -  see prior consultation   [ X ] stable  [   ] improving [  ] worsening    5. History of preeclampsia -  see prior consultation   [ X ] stable  [   ] improving [  ] worsening      6. Previous  delivery - no previa   [ X ] stable  [   ] improving [  ] worsening          PLAN   -Serial growth ultrasounds every 4 - 6 weeks by primary OB   -Fetal movement instructions given continue daily until delivery; instructed to report to labor and delivery if cannot achieve more than 10 kicks in one hour or if she perceives a decrease in fetal movement  -Weekly BPP with primary OB until delivery  -Recommend Hematology followup postpartum in order to manage and assess her need for anticoagulation outside of pregnancy   No need to followup with MFM unless clinically indicated        ANTICOAGULATION MANAGEMENT   Continue prophylactic  Enoxaparin and " daily 81mg aspirin daily - until 35 - 36 weeks  At 35 - 36 weeks STOP aspirin   At 35 - 36 weeks gestation:   Stop  Enoxaparin  Start Unfractionated heparin 10,000 units SC every 12 hours until planned delivery.   STOP anticoagulation at any signs of labor    DELIVERY PLANNING  For planned repeat  delivery at 38 weeks given use of anticoagulation and bleeding risk; delivery sooner if clinically indicatded      Plan discussed with Dr Stone    This note has been routed to the referring obstetricians/medical provider.   At the end of the consultation, all patient questions were answered, concerns addressed, and comprehensive management plan and followup given to patient.     Thank you for your clinical consult.     CHRIS WALLACE MD MPH FACOG  MATERNAL FETAL MEDICINE

## 2021-03-29 ENCOUNTER — ROUTINE PRENATAL (OUTPATIENT)
Dept: OBSTETRICS AND GYNECOLOGY | Age: 36
End: 2021-03-29

## 2021-03-29 VITALS — BODY MASS INDEX: 29.29 KG/M2 | WEIGHT: 187 LBS | DIASTOLIC BLOOD PRESSURE: 68 MMHG | SYSTOLIC BLOOD PRESSURE: 104 MMHG

## 2021-03-29 DIAGNOSIS — O09.299 HX OF PREECLAMPSIA, PRIOR PREGNANCY, CURRENTLY PREGNANT: ICD-10-CM

## 2021-03-29 DIAGNOSIS — Z3A.33 33 WEEKS GESTATION OF PREGNANCY: Primary | ICD-10-CM

## 2021-03-29 DIAGNOSIS — O09.819 PREGNANCY RESULTING FROM ASSISTED REPRODUCTIVE TECHNOLOGY, ANTEPARTUM: ICD-10-CM

## 2021-03-29 DIAGNOSIS — O26.899 RH NEGATIVE STATE IN ANTEPARTUM PERIOD: ICD-10-CM

## 2021-03-29 DIAGNOSIS — Z67.91 RH NEGATIVE STATE IN ANTEPARTUM PERIOD: ICD-10-CM

## 2021-03-29 DIAGNOSIS — D68.59 ANTITHROMBIN 3 DEFICIENCY (HCC): ICD-10-CM

## 2021-03-29 LAB
GLUCOSE UR STRIP-MCNC: NEGATIVE MG/DL
PROT UR STRIP-MCNC: ABNORMAL MG/DL

## 2021-03-29 PROCEDURE — 0502F SUBSEQUENT PRENATAL CARE: CPT | Performed by: NURSE PRACTITIONER

## 2021-03-29 RX ORDER — FAMOTIDINE 10 MG
10 TABLET ORAL 2 TIMES DAILY
COMMUNITY
End: 2021-04-22 | Stop reason: HOSPADM

## 2021-03-29 NOTE — PROGRESS NOTES
Chief Complaint   Patient presents with   • Routine Prenatal Visit        HPI: 36 y.o.  at 33w2d      Pt presents today for routine prenatal visit  BPP 8/8  LAYA 17    [facility]   She is currently taking lovenox 40 mg bid for Antithrombin III Def. Planning to switch to heparin at 36 weeks.   Hx of pre-e: Blood pressure normal today. Pt monitoring blood pressure at home. Denies HA, visual changes, abdominal pain or swelling.   Has had worsening reflux. Taking pepcid 20 mg daily which has helped.   Pt of Dr. Chase Daniel:    21   BP: 104/68   Weight: 84.8 kg (187 lb)       ROS:  GI:  Negative  : Negative  Pulmonary: Negative     A/P  1. Intrauterine pregnancy at 33w2d   2. Pregnancy Risk:  HIGH RISK    Diagnoses and all orders for this visit:    1. 33 weeks gestation of pregnancy (Primary)  -     POC Urinalysis Dipstick    2. Rh negative state in antepartum period    3. Hx of preeclampsia, prior pregnancy, currently pregnant    4. Pregnancy resulting from assisted reproductive technology, antepartum    5. Antithrombin 3 deficiency (CMS/[facility])        -----------------------  PLAN:   BPP 8/8  Continue all medications as prescribed  Continue to monitor blood pressure at home   Rev'd s/s PTL, Pre-eclampsia, FKC, warning signs and to go to L&D if any occur  Continue weekly BPP's   F/u 1 week    CLAUDIA Mena  3/29/2021 09:45 EDT

## 2021-04-03 ENCOUNTER — HOSPITAL ENCOUNTER (EMERGENCY)
Facility: HOSPITAL | Age: 36
Discharge: HOME | End: 2021-04-03
Attending: OBSTETRICS & GYNECOLOGY | Admitting: OBSTETRICS & GYNECOLOGY

## 2021-04-03 VITALS
RESPIRATION RATE: 16 BRPM | DIASTOLIC BLOOD PRESSURE: 72 MMHG | BODY MASS INDEX: 29.66 KG/M2 | TEMPERATURE: 98.1 F | WEIGHT: 189 LBS | SYSTOLIC BLOOD PRESSURE: 111 MMHG | HEIGHT: 67 IN | HEART RATE: 90 BPM

## 2021-04-03 PROBLEM — O26.613 CHOLESTASIS OF PREGNANCY IN THIRD TRIMESTER: Status: ACTIVE | Noted: 2021-04-03

## 2021-04-03 PROBLEM — K83.1 CHOLESTASIS OF PREGNANCY IN THIRD TRIMESTER: Status: ACTIVE | Noted: 2021-04-03

## 2021-04-03 PROBLEM — O26.643 CHOLESTASIS OF PREGNANCY IN THIRD TRIMESTER: Status: ACTIVE | Noted: 2021-04-03

## 2021-04-03 LAB
ALBUMIN SERPL-MCNC: 3.7 G/DL (ref 3.5–5.2)
ALBUMIN/GLOB SERPL: 1.5 G/DL
ALP SERPL-CCNC: 97 U/L (ref 39–117)
ALT SERPL W P-5'-P-CCNC: 92 U/L (ref 1–33)
ANION GAP SERPL CALCULATED.3IONS-SCNC: 10.5 MMOL/L (ref 5–15)
AST SERPL-CCNC: 45 U/L (ref 1–32)
BASOPHILS # BLD AUTO: 0.06 10*3/MM3 (ref 0–0.2)
BASOPHILS NFR BLD AUTO: 0.6 % (ref 0–1.5)
BILIRUB SERPL-MCNC: 0.4 MG/DL (ref 0–1.2)
BUN SERPL-MCNC: 5 MG/DL (ref 6–20)
BUN/CREAT SERPL: 7.5 (ref 7–25)
CALCIUM SPEC-SCNC: 8.2 MG/DL (ref 8.6–10.5)
CHLORIDE SERPL-SCNC: 107 MMOL/L (ref 98–107)
CO2 SERPL-SCNC: 20.5 MMOL/L (ref 22–29)
CREAT SERPL-MCNC: 0.67 MG/DL (ref 0.57–1)
DEPRECATED RDW RBC AUTO: 41 FL (ref 37–54)
EOSINOPHIL # BLD AUTO: 0.09 10*3/MM3 (ref 0–0.4)
EOSINOPHIL NFR BLD AUTO: 0.9 % (ref 0.3–6.2)
ERYTHROCYTE [DISTWIDTH] IN BLOOD BY AUTOMATED COUNT: 13.2 % (ref 12.3–15.4)
GFR SERPL CREATININE-BSD FRML MDRD: 101 ML/MIN/1.73
GLOBULIN UR ELPH-MCNC: 2.4 GM/DL
GLUCOSE SERPL-MCNC: 137 MG/DL (ref 65–99)
HCT VFR BLD AUTO: 34.6 % (ref 34–46.6)
HGB BLD-MCNC: 12.2 G/DL (ref 12–15.9)
IMM GRANULOCYTES # BLD AUTO: 0.17 10*3/MM3 (ref 0–0.05)
IMM GRANULOCYTES NFR BLD AUTO: 1.6 % (ref 0–0.5)
LYMPHOCYTES # BLD AUTO: 2.83 10*3/MM3 (ref 0.7–3.1)
LYMPHOCYTES NFR BLD AUTO: 26.9 % (ref 19.6–45.3)
MCH RBC QN AUTO: 30.7 PG (ref 26.6–33)
MCHC RBC AUTO-ENTMCNC: 35.3 G/DL (ref 31.5–35.7)
MCV RBC AUTO: 86.9 FL (ref 79–97)
MONOCYTES # BLD AUTO: 0.62 10*3/MM3 (ref 0.1–0.9)
MONOCYTES NFR BLD AUTO: 5.9 % (ref 5–12)
NEUTROPHILS NFR BLD AUTO: 6.76 10*3/MM3 (ref 1.7–7)
NEUTROPHILS NFR BLD AUTO: 64.1 % (ref 42.7–76)
NRBC BLD AUTO-RTO: 0 /100 WBC (ref 0–0.2)
PLATELET # BLD AUTO: 250 10*3/MM3 (ref 140–450)
PMV BLD AUTO: 10.1 FL (ref 6–12)
POTASSIUM SERPL-SCNC: 3.9 MMOL/L (ref 3.5–5.2)
PROT SERPL-MCNC: 6.1 G/DL (ref 6–8.5)
RBC # BLD AUTO: 3.98 10*6/MM3 (ref 3.77–5.28)
SODIUM SERPL-SCNC: 138 MMOL/L (ref 136–145)
WBC # BLD AUTO: 10.53 10*3/MM3 (ref 3.4–10.8)

## 2021-04-03 PROCEDURE — 59025 FETAL NON-STRESS TEST: CPT | Performed by: OBSTETRICS & GYNECOLOGY

## 2021-04-03 PROCEDURE — 99283 EMERGENCY DEPT VISIT LOW MDM: CPT | Performed by: OBSTETRICS & GYNECOLOGY

## 2021-04-03 PROCEDURE — 80053 COMPREHEN METABOLIC PANEL: CPT | Performed by: OBSTETRICS & GYNECOLOGY

## 2021-04-03 PROCEDURE — 82239 BILE ACIDS TOTAL: CPT | Performed by: OBSTETRICS & GYNECOLOGY

## 2021-04-03 PROCEDURE — 85025 COMPLETE CBC W/AUTO DIFF WBC: CPT | Performed by: OBSTETRICS & GYNECOLOGY

## 2021-04-03 PROCEDURE — 59025 FETAL NON-STRESS TEST: CPT

## 2021-04-03 RX ORDER — URSODIOL 300 MG/1
300 CAPSULE ORAL 2 TIMES DAILY
Qty: 60 CAPSULE | Refills: 3 | Status: SHIPPED | OUTPATIENT
Start: 2021-04-03 | End: 2021-04-22 | Stop reason: HOSPADM

## 2021-04-03 RX ORDER — HYDROXYZINE HYDROCHLORIDE 10 MG/1
10-20 TABLET, FILM COATED ORAL 3 TIMES DAILY PRN
Qty: 50 TABLET | Refills: 1 | Status: SHIPPED | OUTPATIENT
Start: 2021-04-03 | End: 2021-04-22 | Stop reason: HOSPADM

## 2021-04-03 NOTE — NON STRESS TEST
Carey Armando, a  at 34w0d with an LESLY of 5/15/2021, by Other Basis, was seen at [facility] for a nonstress test.    Chief Complaint   Patient presents with   • Itching     LARA. Patient reports itching of hands/feet since last night. +FM. Denies LOF/VB.        Patient Active Problem List   Diagnosis   • Rh negative state in antepartum period   • Fibroid   • Cholestasis during pregnancy in third trimester   • Antithrombin 3 deficiency (CMS/HCC)   • Hx of preeclampsia, prior pregnancy, currently pregnant   • Pregnancy resulting from assisted reproductive technology, antepartum   • Family history of congenital anomaly       Start Time: 937  Stop Time: 957    Interpretation A  Nonstress Test Interpretation A: Reactive (21 : Carmelita Wiseman RN)

## 2021-04-03 NOTE — H&P
[facility] LARA Provider Note        4/3/2021 10:11 EDT    Carey Armando is a 36 y.o.  at 34w0d LESLY  5/15/2021, by Other Basis who presents for : Itching (LARA. Patient reports itching of hands/feet since last night. +FM. Denies LOF/VB. )          HPI: Carey Armando is a 36 yr old  with history of cholestasis and G1 and also delivery at 34 weeks for preeclampsia with severe features who presents with significant itching.  It started yesterday and got worse overnight.  She denies any rash.  The itching is worst on her hands and her feet, but it is generalized as well.  She was concerned about having cholestasis once again.    She is on Lovenox for Antithrombin III deficiency, the plan was to switch to heparin at 35 weeks.    She denies any headache, visual changes  She has normal fetal movement, no loss of fluid  No upper abdominal pain, no contractions  No increase in swelling, no calf pain  No nausea or vomiting  No rash or pallor of the skin  A comprehensive review of systems was otherwise within normal limits except as noted above        Patient Active Problem List    Diagnosis    • Family history of congenital anomaly [Z82.79]    • Hx of preeclampsia, prior pregnancy, currently pregnant [O09.299]    • Pregnancy resulting from assisted reproductive technology, antepartum [O09.819]    • Antithrombin 3 deficiency (CMS/HCC) [D68.59]    • Cholestasis during pregnancy in third trimester [O26.613, K83.1]    • Rh negative state in antepartum period [O26.899, Z67.91]    • Fibroid [D21.9]          POB:   OB History    Para Term  AB Living   2 1 0 1 0 1   SAB TAB Ectopic Molar Multiple Live Births   0 0 0 0 0 1      # Outcome Date GA Lbr Ranjit/2nd Weight Sex Delivery Anes PTL Lv   2 Current            1  19 33w4d  2355 g (5 lb 3.1 oz) M CS-LTranv Spinal N SHANE      Birth Comments: Issa gaona OR1      Name: JULIANA ARMANDO      Apgar1: 8  Apgar5: 9      PMH:   Past Medical History:    Diagnosis Date   • Antithrombin III deficiency (CMS/HCC)    • Cholestasis    • Fibroid    • H/O foreign travel 2018    [place]   • Infectious mononucleosis    •  product of in vitro fertilization (IVF) pregnancy 2018   • PIH (pregnancy induced hypertension)      PSH:   Past Surgical History:   Procedure Laterality Date   •  SECTION N/A 2019    Procedure:  SECTION PRIMARY;  Surgeon: Julio César Leal MD;  Location: [facility] LABOR DELIVERY;  Service: Obstetrics/Gynecology   • LASIK     • WISDOM TOOTH EXTRACTION       FH:    Family History   Problem Relation Age of Onset   • Colon cancer Father 54   • Hypertension Father    • Breast cancer Maternal Grandmother         postmenopausal   • Prostate cancer Paternal Uncle    • Deep vein thrombosis Neg Hx    • Pulmonary embolism Neg Hx      SH:   Social History     Socioeconomic History   • Marital status:      Spouse name: Vasile   • Number of children: 1   • Years of education: College   • Highest education level: Not on file   Tobacco Use   • Smoking status: Never Smoker   • Smokeless tobacco: Never Used   Substance and Sexual Activity   • Alcohol use: No     Comment: occasional   • Drug use: No   • Sexual activity: Yes     Partners: Male     Birth control/protection: None       Allergies: Ceclor [cefaclor]    Medications:   Medications Prior to Admission   Medication Sig Dispense Refill Last Dose   • aspirin 81 MG EC tablet Take 81 mg by mouth Daily.   2021 at 2130   • diphenhydrAMINE (BENADRYL) 25 mg capsule Take 25 mg by mouth Every 6 (Six) Hours As Needed for Itching.   2021 at Unknown time   • docusate sodium (COLACE) 100 MG capsule Take 100 mg by mouth 2 (Two) Times a Day.   Past Week at Unknown time   • enoxaparin (LOVENOX) 40 MG/0.4ML solution syringe Inject 0.4 mL under the skin into the appropriate area as directed Every 12 (Twelve) Hours. 60 syringe 10 4/3/2021 at Unknown time   • famotidine (PEPCID) 10 MG tablet Take 10  "mg by mouth 2 (Two) Times a Day.   4/3/2021 at Unknown time   • ferrous sulfate 325 (65 FE) MG tablet Take 325 mg by mouth Daily With Breakfast.   4/3/2021 at Unknown time   • Prenatal Vit-Fe Fumarate-FA (PRENATAL, CLASSIC, VITAMIN) 28-0.8 MG tablet tablet Take  by mouth Daily.   4/2/2021 at Unknown time   • Heparin Sodium, Porcine, (heparin, porcine,) 5000 UNIT/ML injection Inject 2 mL under the skin into the appropriate area as directed Every 12 (Twelve) Hours. 120 mL 0          Physical exam    Temp:  [98.1 °F (36.7 °C)] 98.1 °F (36.7 °C)  Heart Rate:  [84] 84  Resp:  [16] 16  BP: (113)/(72) 113/72  Estimated body mass index is 29.6 kg/m² as calculated from the following:    Height as of this encounter: 170.2 cm (67\").    Weight as of this encounter: 85.7 kg (189 lb).    General appearance - alert, well appearing, and in no distress  Mental status - alert, oriented to person, place, and time  Eyes - pupils equal and reactive, extraocular eye movements intact  Chest - clear to auscultation, no wheezes, rales or rhonchi, symmetric air entry  Heart - normal rate, regular rhythm, normal S1, S2, no murmurs, rubs, clicks or gallops  Abdomen -fundus is soft and nontender, no upper abdominal tenderness as well  Neurological - alert, oriented, normal speech, no focal findings or movement disorder noted  Extremities -no peripheral edema, no calf tenderness  Skin - normal coloration and turgor, no rashes, no suspicious skin lesions noted      Uterine Activity Assessment  Method: external tocotransducer  Contraction Frequency (Minutes): 0  Contraction Intensity: no contractions  Uterine Resting Tone: soft by palpation  Uterine Tenderness: No    Membranes: Intact                              External Prenatal Results     Pregnancy Outside Results - Transcribed From Office Records - See Scanned Records For Details     Test Value Date Time    Hgb  12.2 g/dL 04/03/21 0942       13.1 g/dL 03/23/21 1106       12.7 g/dL 03/08/21 " 1033       11.4 g/dL 02/02/21 1118       12.2 g/dL 01/12/21 1027       12.4 g/dL 11/10/20 1103       13.9 g/dL 10/23/20 1232    Hct  34.6 % 04/03/21 0942       37.6 % 03/23/21 1106       37.7 % 03/08/21 1033       33.1 % 02/02/21 1118       36.0 % 01/12/21 1027       37.2 % 11/10/20 1103       40.1 % 10/23/20 1232    ABO  O  10/23/20 1232    Rh  Negative  10/23/20 1232    Antibody Screen  Negative  02/23/21 1609       Negative  02/02/21 1118       Negative  10/23/20 1232    Glucose Fasting GTT       Glucose Tolerance Test 1 hour       Glucose Tolerance Test 3 hour       Gonorrhea (discrete)       Chlamydia (discrete)       RPR  Non Reactive  10/23/20 1232    VDRL       Syphilis Antibody       Rubella  9.20 index 10/23/20 1232    HBsAg  Negative  10/23/20 1232    Herpes Simplex Virus PCR       Herpes Simplex VIrus Culture       HIV  Non Reactive  10/23/20 1232    Hep C RNA Quant PCR       Hep C Antibody  <0.1 s/co ratio 10/23/20 1232    AFP  28.7 ng/mL 12/01/20 1048    Group B Strep  No Group B Streptococcus isolated  02/26/19 1635    GBS Susceptibility to Clindamycin       GBS Susceptibility to Erythromycin       Fetal Fibronectin       Genetic Testing, Maternal Blood             Drug Screening     Test Value Date Time    Urine Drug Screen       Amphetamine Screen       Barbiturate Screen       Benzodiazepine Screen       Methadone Screen       Phencyclidine Screen       Opiates Screen       THC Screen       Cocaine Screen       Propoxyphene Screen       Buprenorphine Screen       Methamphetamine Screen       Oxycodone Screen       Tricyclic Antidepressants Screen             Legend    ^: Historical                      Results from last 7 days   Lab Units 04/03/21  0942   SODIUM mmol/L 138   POTASSIUM mmol/L 3.9   CHLORIDE mmol/L 107   CO2 mmol/L 20.5*   BUN mg/dL 5*   CREATININE mg/dL 0.67   CALCIUM mg/dL 8.2*   BILIRUBIN mg/dL 0.4   ALK PHOS U/L 97   ALT (SGPT) U/L 92*   AST (SGOT) U/L 45*   GLUCOSE mg/dL 137*          Impression:   @ 34w0d .  Final Diagnosis: likely cholestasis of pregnancy    Plan:  1.  She has a history of cholestasis and recurrent itching this pregnancy of new onset.  She also has a transaminitis, which is likely due to cholestasis of pregnancy.  Bile acids are collected and pending.  She did have recent bile acid studies that were normal, but new onset of itching.  Ursodiol and Atarax were prescribed for her.  She has a visit on Tuesday with BPP, and discussed she would likely need a course of steroids as well.  She also has a history of preeclampsia with severe features, but her blood pressures are normal at today's visit.  As she will likely deliver prior to 37 weeks, plan to switch from the Lovenox to heparin, which she already has at the pharmacy.  2. Plan of care has been reviewed with the patient  3.  Risks, benefits of treatment plan have been discussed.  4.  All questions have been answered.      I discussed the patients findings and my recommendations with patient      Ruthie Dumont MD  4/3/2021  10:11 EDT

## 2021-04-06 ENCOUNTER — PREP FOR SURGERY (OUTPATIENT)
Dept: OTHER | Facility: HOSPITAL | Age: 36
End: 2021-04-06

## 2021-04-06 ENCOUNTER — ROUTINE PRENATAL (OUTPATIENT)
Dept: OBSTETRICS AND GYNECOLOGY | Age: 36
End: 2021-04-06

## 2021-04-06 ENCOUNTER — TELEPHONE (OUTPATIENT)
Dept: OBSTETRICS AND GYNECOLOGY | Age: 36
End: 2021-04-06

## 2021-04-06 VITALS — SYSTOLIC BLOOD PRESSURE: 112 MMHG | DIASTOLIC BLOOD PRESSURE: 68 MMHG | WEIGHT: 189 LBS | BODY MASS INDEX: 29.6 KG/M2

## 2021-04-06 DIAGNOSIS — K83.1 CHOLESTASIS OF PREGNANCY IN THIRD TRIMESTER: ICD-10-CM

## 2021-04-06 DIAGNOSIS — O26.613 CHOLESTASIS OF PREGNANCY IN THIRD TRIMESTER: ICD-10-CM

## 2021-04-06 DIAGNOSIS — Z13.89 SCREENING FOR BLOOD OR PROTEIN IN URINE: Primary | ICD-10-CM

## 2021-04-06 LAB
BILE AC SERPL-SCNC: 11.7 UMOL/L (ref 0–10)
GLUCOSE UR STRIP-MCNC: NEGATIVE MG/DL
PROT UR STRIP-MCNC: NEGATIVE MG/DL

## 2021-04-06 PROCEDURE — 0502F SUBSEQUENT PRENATAL CARE: CPT | Performed by: OBSTETRICS & GYNECOLOGY

## 2021-04-06 NOTE — TELEPHONE ENCOUNTER
Chase pt    [facility] called stating that they need a refill on the pt heparin. [facility] stated that there was a change in what she got last time

## 2021-04-06 NOTE — PROGRESS NOTES
Pt presents for routine visit. She was diagnosed with cholestasis on 4/3 and started actigal. She notes her itching symptoms have improved. She notes active fetal movement. She changed to heparin 10,000 units twice daily.     U/S: BPP 8/8, swathi is normal    A/p: cholestasis: symptoms are improved. Will continue actigal and start twice weekly surveillance. Called Dr. Vitor POWELL for recommendations. She recommends delivery from 36 to 37 weeks in light of cholestasis and high risk pregnancy. Carey agrees with this plan. Reviewed options and will r/s c/s to 4/19. Discussed betamethasone. Will start course following appt this Friday which will be about 1 week from delivery and pt agrees with this. Will repeat cbc/cmp/bile acids today.     Antithrombin III def: pt switched to heparin 10,000 units SC twice daily. Pt advised to hold for any issues suggesting delivery will be indicated. Appropriate fetal growth noted at 31 weeks    Hx preeclampsia: BP remains normal and pt denies preeclamptic symptoms. She has been on baby asa but will stop now as delivery planned in approx 2 weeks.     rtc 3 days for BPP

## 2021-04-07 LAB
ALBUMIN SERPL-MCNC: 3.8 G/DL (ref 3.5–5.2)
ALBUMIN/GLOB SERPL: 1.5 G/DL
ALP SERPL-CCNC: 105 U/L (ref 39–117)
ALT SERPL-CCNC: 75 U/L (ref 1–33)
AST SERPL-CCNC: 29 U/L (ref 1–32)
BASOPHILS # BLD AUTO: 0.08 10*3/MM3 (ref 0–0.2)
BASOPHILS NFR BLD AUTO: 0.6 % (ref 0–1.5)
BILE AC SERPL-SCNC: 11.3 UMOL/L (ref 0–10)
BILIRUB SERPL-MCNC: 0.2 MG/DL (ref 0–1.2)
BUN SERPL-MCNC: 7 MG/DL (ref 6–20)
BUN/CREAT SERPL: 12.3 (ref 7–25)
CALCIUM SERPL-MCNC: 9 MG/DL (ref 8.6–10.5)
CHLORIDE SERPL-SCNC: 104 MMOL/L (ref 98–107)
CO2 SERPL-SCNC: 20.6 MMOL/L (ref 22–29)
CREAT SERPL-MCNC: 0.57 MG/DL (ref 0.57–1)
EOSINOPHIL # BLD AUTO: 0.12 10*3/MM3 (ref 0–0.4)
EOSINOPHIL NFR BLD AUTO: 0.8 % (ref 0.3–6.2)
ERYTHROCYTE [DISTWIDTH] IN BLOOD BY AUTOMATED COUNT: 13.1 % (ref 12.3–15.4)
GLOBULIN SER CALC-MCNC: 2.5 GM/DL
GLUCOSE SERPL-MCNC: 127 MG/DL (ref 65–99)
HCT VFR BLD AUTO: 37.6 % (ref 34–46.6)
HGB BLD-MCNC: 13.3 G/DL (ref 12–15.9)
IMM GRANULOCYTES # BLD AUTO: 0.52 10*3/MM3 (ref 0–0.05)
IMM GRANULOCYTES NFR BLD AUTO: 3.6 % (ref 0–0.5)
LYMPHOCYTES # BLD AUTO: 3.24 10*3/MM3 (ref 0.7–3.1)
LYMPHOCYTES NFR BLD AUTO: 22.6 % (ref 19.6–45.3)
MCH RBC QN AUTO: 31.2 PG (ref 26.6–33)
MCHC RBC AUTO-ENTMCNC: 35.4 G/DL (ref 31.5–35.7)
MCV RBC AUTO: 88.3 FL (ref 79–97)
MONOCYTES # BLD AUTO: 0.85 10*3/MM3 (ref 0.1–0.9)
MONOCYTES NFR BLD AUTO: 5.9 % (ref 5–12)
NEUTROPHILS # BLD AUTO: 9.55 10*3/MM3 (ref 1.7–7)
NEUTROPHILS NFR BLD AUTO: 66.5 % (ref 42.7–76)
NRBC BLD AUTO-RTO: 0 /100 WBC (ref 0–0.2)
PLATELET # BLD AUTO: 281 10*3/MM3 (ref 140–450)
POTASSIUM SERPL-SCNC: 4.2 MMOL/L (ref 3.5–5.2)
PROT SERPL-MCNC: 6.3 G/DL (ref 6–8.5)
RBC # BLD AUTO: 4.26 10*6/MM3 (ref 3.77–5.28)
SODIUM SERPL-SCNC: 136 MMOL/L (ref 136–145)
WBC # BLD AUTO: 14.36 10*3/MM3 (ref 3.4–10.8)

## 2021-04-08 ENCOUNTER — TELEPHONE (OUTPATIENT)
Dept: OBSTETRICS AND GYNECOLOGY | Age: 36
End: 2021-04-08

## 2021-04-08 NOTE — TELEPHONE ENCOUNTER
----- Message from Rea Stone MD sent at 4/8/2021  8:33 AM EDT -----  Called pt to review results. No answer and left message. Please let her know her bile acids are slightly improved. AST/ALT are also improved.

## 2021-04-09 ENCOUNTER — ROUTINE PRENATAL (OUTPATIENT)
Dept: OBSTETRICS AND GYNECOLOGY | Age: 36
End: 2021-04-09

## 2021-04-09 VITALS — WEIGHT: 187 LBS | DIASTOLIC BLOOD PRESSURE: 68 MMHG | BODY MASS INDEX: 29.29 KG/M2 | SYSTOLIC BLOOD PRESSURE: 108 MMHG

## 2021-04-09 DIAGNOSIS — Z34.83 PRENATAL CARE, SUBSEQUENT PREGNANCY, THIRD TRIMESTER: Primary | ICD-10-CM

## 2021-04-09 LAB
GLUCOSE UR STRIP-MCNC: NEGATIVE MG/DL
PROT UR STRIP-MCNC: NEGATIVE MG/DL

## 2021-04-09 PROCEDURE — 0502F SUBSEQUENT PRENATAL CARE: CPT | Performed by: OBSTETRICS & GYNECOLOGY

## 2021-04-09 RX ORDER — HEPARIN SODIUM 10000 [USP'U]/ML
INJECTION, SOLUTION INTRAVENOUS; SUBCUTANEOUS
COMMUNITY
Start: 2021-04-07 | End: 2021-04-22 | Stop reason: HOSPADM

## 2021-04-09 NOTE — PROGRESS NOTES
Pt presents for routine visit. No issues today. Her itching has resolved with actigall. She notes active fetal movement. She has had some headaches but they resolve     O: BPP 8/8, growth composite 83 %, AC 91 %    A/p: cholestasis: improved LFT's and slight improvement in bile acids on actigall, will continue this and twice weekly  assessment. Plan course of betamethasone. Pt cannot go today and requests course tomorrow/ and called L&D and scheduled. Plan delivery at 36 to 37 weeks per [facility] recommendations.     Antithrombin III def: pt on heparin 10,000 units twice daily; she is aware to hold with any labor signs    Hx preeclampsia: pt reports BP has remained normal at home. She has had some mild headaches but they resolve with tylenol/hydration. Reviewed preeclamptic warnings.     Rh neg: pt received rhogam prophylaxis.     LGA : noted with u/s, pt passed one hour

## 2021-04-10 ENCOUNTER — HOSPITAL ENCOUNTER (OUTPATIENT)
Facility: HOSPITAL | Age: 36
Discharge: HOME | End: 2021-04-10
Attending: OBSTETRICS & GYNECOLOGY | Admitting: OBSTETRICS & GYNECOLOGY

## 2021-04-10 VITALS
SYSTOLIC BLOOD PRESSURE: 107 MMHG | HEIGHT: 67 IN | WEIGHT: 187 LBS | TEMPERATURE: 98.1 F | RESPIRATION RATE: 17 BRPM | DIASTOLIC BLOOD PRESSURE: 57 MMHG | HEART RATE: 73 BPM | BODY MASS INDEX: 29.35 KG/M2

## 2021-04-10 PROBLEM — Z34.90 PREGNANCY: Status: ACTIVE | Noted: 2021-04-10

## 2021-04-10 PROCEDURE — 25010000002 BETAMETHASONE ACET & SOD PHOS PER 4 MG: Performed by: OBSTETRICS & GYNECOLOGY

## 2021-04-10 PROCEDURE — 59025 FETAL NON-STRESS TEST: CPT | Performed by: OBSTETRICS & GYNECOLOGY

## 2021-04-10 PROCEDURE — 59025 FETAL NON-STRESS TEST: CPT

## 2021-04-10 PROCEDURE — 96372 THER/PROPH/DIAG INJ SC/IM: CPT

## 2021-04-10 PROCEDURE — G0378 HOSPITAL OBSERVATION PER HR: HCPCS

## 2021-04-10 RX ORDER — BETAMETHASONE SODIUM PHOSPHATE AND BETAMETHASONE ACETATE 3; 3 MG/ML; MG/ML
12 INJECTION, SUSPENSION INTRA-ARTICULAR; INTRALESIONAL; INTRAMUSCULAR; SOFT TISSUE ONCE
Status: COMPLETED | OUTPATIENT
Start: 2021-04-10 | End: 2021-04-10

## 2021-04-10 RX ADMIN — BETAMETHASONE SODIUM PHOSPHATE AND BETAMETHASONE ACETATE 12 MG: 3; 3 INJECTION, SUSPENSION INTRA-ARTICULAR; INTRALESIONAL; INTRAMUSCULAR at 12:50

## 2021-04-10 NOTE — NON STRESS TEST
Carey Armando, a  at 35w0d with an LESLY of 5/15/2021, by Other Basis, was seen at [facility] LABOR DELIVERY for a nonstress test.    Chief Complaint   Patient presents with   • Non-stress Test     Pt sent for NST and BMZ. Notes +FM, denies LOF, vaginal bleeding or ctxs.        Patient Active Problem List   Diagnosis   • Rh negative state in antepartum period   • Fibroid   • Cholestasis during pregnancy in third trimester   • Antithrombin 3 deficiency (CMS/HCC)   • Hx of preeclampsia, prior pregnancy, currently pregnant   • Pregnancy resulting from assisted reproductive technology, antepartum   • Family history of congenital anomaly   • Cholestasis of pregnancy in third trimester   • Pregnancy       Start Time: 1232  Stop Time: 1304    Interpretation A  Nonstress Test Interpretation A: Reactive (04/10/21 1304 : Carmelita Wiseman, RN)

## 2021-04-10 NOTE — NURSING NOTE
D/c instructions reviewed with patient. Discussed fetal kick counts, vaginal bleeding, and leaking of fluid. Patient to keep all scheduled appointments, to MD or return to L&D if any additional problems. Patient ambulated off unit.

## 2021-04-11 ENCOUNTER — HOSPITAL ENCOUNTER (OUTPATIENT)
Facility: HOSPITAL | Age: 36
Discharge: HOME | End: 2021-04-11
Attending: OBSTETRICS & GYNECOLOGY | Admitting: OBSTETRICS & GYNECOLOGY

## 2021-04-11 VITALS
BODY MASS INDEX: 29.35 KG/M2 | TEMPERATURE: 98.1 F | OXYGEN SATURATION: 99 % | HEART RATE: 77 BPM | RESPIRATION RATE: 18 BRPM | SYSTOLIC BLOOD PRESSURE: 100 MMHG | DIASTOLIC BLOOD PRESSURE: 47 MMHG | WEIGHT: 187.4 LBS

## 2021-04-11 PROCEDURE — 96372 THER/PROPH/DIAG INJ SC/IM: CPT

## 2021-04-11 PROCEDURE — 59025 FETAL NON-STRESS TEST: CPT | Performed by: OBSTETRICS & GYNECOLOGY

## 2021-04-11 PROCEDURE — 59025 FETAL NON-STRESS TEST: CPT

## 2021-04-11 PROCEDURE — 25010000002 BETAMETHASONE ACET & SOD PHOS PER 4 MG: Performed by: OBSTETRICS & GYNECOLOGY

## 2021-04-11 PROCEDURE — G0378 HOSPITAL OBSERVATION PER HR: HCPCS

## 2021-04-11 RX ORDER — BETAMETHASONE SODIUM PHOSPHATE AND BETAMETHASONE ACETATE 3; 3 MG/ML; MG/ML
12 INJECTION, SUSPENSION INTRA-ARTICULAR; INTRALESIONAL; INTRAMUSCULAR; SOFT TISSUE ONCE
Status: COMPLETED | OUTPATIENT
Start: 2021-04-11 | End: 2021-04-11

## 2021-04-11 RX ADMIN — BETAMETHASONE SODIUM PHOSPHATE AND BETAMETHASONE ACETATE 12 MG: 3; 3 INJECTION, SUSPENSION INTRA-ARTICULAR; INTRALESIONAL; INTRAMUSCULAR at 13:20

## 2021-04-11 NOTE — NURSING NOTE
AVS printed & given to pt. Education completed & all questions answered. Pt states understanding.

## 2021-04-11 NOTE — NON STRESS TEST
Reason for test: OB Triage  Date of Test: 2021  Time frame of test: 2531-5756  RN NST Interpretation: Reactive    Carey Armando, rocío  at 35w1d with an LESLY of 5/15/2021, by Other Basis, was seen at [facility] for a nonstress test.    Chief Complaint   Patient presents with   • Non-stress Test     OUTPT. NST & DOSE #2 BMZ. +FM. DENIES LOF, VB, CTXS.       Patient Active Problem List   Diagnosis   • Rh negative state in antepartum period   • Fibroid   • Cholestasis during pregnancy in third trimester   • Antithrombin 3 deficiency (CMS/HCC)   • Hx of preeclampsia, prior pregnancy, currently pregnant   • Pregnancy resulting from assisted reproductive technology, antepartum   • Family history of congenital anomaly   • Cholestasis of pregnancy in third trimester   • Pregnancy

## 2021-04-13 ENCOUNTER — ROUTINE PRENATAL (OUTPATIENT)
Dept: OBSTETRICS AND GYNECOLOGY | Age: 36
End: 2021-04-13

## 2021-04-13 VITALS — SYSTOLIC BLOOD PRESSURE: 100 MMHG | WEIGHT: 186 LBS | DIASTOLIC BLOOD PRESSURE: 60 MMHG | BODY MASS INDEX: 29.13 KG/M2

## 2021-04-13 DIAGNOSIS — Z34.83 PRENATAL CARE, SUBSEQUENT PREGNANCY, THIRD TRIMESTER: Primary | ICD-10-CM

## 2021-04-13 DIAGNOSIS — Z13.89 SCREENING FOR HEMATURIA OR PROTEINURIA: ICD-10-CM

## 2021-04-13 DIAGNOSIS — Z36.85 ANTENATAL SCREENING FOR STREPTOCOCCUS B: ICD-10-CM

## 2021-04-13 DIAGNOSIS — K83.1 CHOLESTASIS DURING PREGNANCY IN THIRD TRIMESTER: ICD-10-CM

## 2021-04-13 DIAGNOSIS — O26.613 CHOLESTASIS DURING PREGNANCY IN THIRD TRIMESTER: ICD-10-CM

## 2021-04-13 DIAGNOSIS — Z3A.35 35 WEEKS GESTATION OF PREGNANCY: ICD-10-CM

## 2021-04-13 PROCEDURE — 0502F SUBSEQUENT PRENATAL CARE: CPT | Performed by: OBSTETRICS & GYNECOLOGY

## 2021-04-13 NOTE — PROGRESS NOTES
Pt presents for routine visit. She notes active fetal movement. She denies any regular ctx, vag bleeding or leaking fluid. She denies any recent itching. Her BP values remain normal at home. She had course of BMS.  O: BPP 8/8, swathi is 10.7    A/p: antithrombin III def: plan continue heparin until the day prior to c/s. Will re-start lovenox following surgery. Pt to hold heparin with labor symptoms    Cholestasis: symptoms resolved on actigall. Will continue medication and repeated labs today. Delivery at 36 weeks per MFM. F/u 3 days.     Hx preeclampsia: BP's remain normal. Continue preeclamptic warnings

## 2021-04-14 ENCOUNTER — TELEPHONE (OUTPATIENT)
Dept: OBSTETRICS AND GYNECOLOGY | Age: 36
End: 2021-04-14

## 2021-04-14 LAB
ALBUMIN SERPL-MCNC: 3.8 G/DL (ref 3.5–5.2)
ALBUMIN/GLOB SERPL: 1.6 G/DL
ALP SERPL-CCNC: 90 U/L (ref 39–117)
ALT SERPL-CCNC: 33 U/L (ref 1–33)
AST SERPL-CCNC: 22 U/L (ref 1–32)
BASOPHILS # BLD AUTO: ABNORMAL 10*3/UL
BILE AC SERPL-SCNC: 12.9 UMOL/L (ref 0–10)
BILIRUB SERPL-MCNC: 0.2 MG/DL (ref 0–1.2)
BUN SERPL-MCNC: 7 MG/DL (ref 6–20)
BUN/CREAT SERPL: 12.7 (ref 7–25)
CALCIUM SERPL-MCNC: 9.1 MG/DL (ref 8.6–10.5)
CHLORIDE SERPL-SCNC: 106 MMOL/L (ref 98–107)
CO2 SERPL-SCNC: 19.7 MMOL/L (ref 22–29)
CREAT SERPL-MCNC: 0.55 MG/DL (ref 0.57–1)
DIFFERENTIAL COMMENT: ABNORMAL
EOSINOPHIL # BLD AUTO: ABNORMAL 10*3/UL
EOSINOPHIL NFR BLD AUTO: ABNORMAL %
ERYTHROCYTE [DISTWIDTH] IN BLOOD BY AUTOMATED COUNT: 12.9 % (ref 12.3–15.4)
GLOBULIN SER CALC-MCNC: 2.4 GM/DL
GLUCOSE SERPL-MCNC: 129 MG/DL (ref 65–99)
HCT VFR BLD AUTO: 35.9 % (ref 34–46.6)
HGB BLD-MCNC: 12.9 G/DL (ref 12–15.9)
LYMPHOCYTES # BLD AUTO: ABNORMAL 10*3/UL
LYMPHOCYTES # BLD MANUAL: 5.14 10*3/MM3 (ref 0.7–3.1)
LYMPHOCYTES NFR BLD AUTO: ABNORMAL %
LYMPHOCYTES NFR BLD MANUAL: 28.9 % (ref 19.6–45.3)
MCH RBC QN AUTO: 31.6 PG (ref 26.6–33)
MCHC RBC AUTO-ENTMCNC: 35.9 G/DL (ref 31.5–35.7)
MCV RBC AUTO: 88 FL (ref 79–97)
MONOCYTES # BLD MANUAL: 1.39 10*3/MM3 (ref 0.1–0.9)
MONOCYTES NFR BLD AUTO: ABNORMAL %
MONOCYTES NFR BLD MANUAL: 7.8 % (ref 5–12)
NEUTROPHILS # BLD MANUAL: 11.25 10*3/MM3 (ref 1.7–7)
NEUTROPHILS NFR BLD AUTO: ABNORMAL %
NEUTROPHILS NFR BLD MANUAL: 63.3 % (ref 42.7–76)
PLATELET # BLD AUTO: 299 10*3/MM3 (ref 140–450)
PLATELET BLD QL SMEAR: ABNORMAL
POTASSIUM SERPL-SCNC: 4.1 MMOL/L (ref 3.5–5.2)
PROT SERPL-MCNC: 6.2 G/DL (ref 6–8.5)
RBC # BLD AUTO: 4.08 10*6/MM3 (ref 3.77–5.28)
RBC MORPH BLD: ABNORMAL
SODIUM SERPL-SCNC: 137 MMOL/L (ref 136–145)
WBC # BLD AUTO: 17.78 10*3/MM3 (ref 3.4–10.8)

## 2021-04-15 LAB — GP B STREP DNA SPEC QL NAA+PROBE: NEGATIVE

## 2021-04-16 ENCOUNTER — BULK ORDERING (OUTPATIENT)
Dept: CASE MANAGEMENT | Facility: OTHER | Age: 36
End: 2021-04-16

## 2021-04-16 ENCOUNTER — PREP FOR SURGERY (OUTPATIENT)
Dept: OTHER | Facility: HOSPITAL | Age: 36
End: 2021-04-16

## 2021-04-16 ENCOUNTER — ROUTINE PRENATAL (OUTPATIENT)
Dept: OBSTETRICS AND GYNECOLOGY | Age: 36
End: 2021-04-16

## 2021-04-16 VITALS — SYSTOLIC BLOOD PRESSURE: 100 MMHG | BODY MASS INDEX: 29.04 KG/M2 | DIASTOLIC BLOOD PRESSURE: 60 MMHG | WEIGHT: 185.4 LBS

## 2021-04-16 DIAGNOSIS — O26.613 CHOLESTASIS DURING PREGNANCY IN THIRD TRIMESTER: Primary | ICD-10-CM

## 2021-04-16 DIAGNOSIS — K83.1 CHOLESTASIS DURING PREGNANCY IN THIRD TRIMESTER: Primary | ICD-10-CM

## 2021-04-16 DIAGNOSIS — Z98.891 PREVIOUS CESAREAN SECTION: ICD-10-CM

## 2021-04-16 DIAGNOSIS — Z3A.35 35 WEEKS GESTATION OF PREGNANCY: Primary | ICD-10-CM

## 2021-04-16 DIAGNOSIS — Z23 IMMUNIZATION DUE: ICD-10-CM

## 2021-04-16 PROCEDURE — 0502F SUBSEQUENT PRENATAL CARE: CPT | Performed by: OBSTETRICS & GYNECOLOGY

## 2021-04-16 RX ORDER — CARBOPROST TROMETHAMINE 250 UG/ML
250 INJECTION, SOLUTION INTRAMUSCULAR AS NEEDED
Status: CANCELLED | OUTPATIENT
Start: 2021-04-16

## 2021-04-16 RX ORDER — METHYLERGONOVINE MALEATE 0.2 MG/ML
200 INJECTION INTRAVENOUS ONCE AS NEEDED
Status: CANCELLED | OUTPATIENT
Start: 2021-04-16

## 2021-04-16 RX ORDER — CLINDAMYCIN PHOSPHATE 900 MG/50ML
900 INJECTION INTRAVENOUS ONCE
Status: CANCELLED | OUTPATIENT
Start: 2021-04-16 | End: 2021-04-16

## 2021-04-16 RX ORDER — SODIUM CHLORIDE 0.9 % (FLUSH) 0.9 %
10 SYRINGE (ML) INJECTION AS NEEDED
Status: CANCELLED | OUTPATIENT
Start: 2021-04-16

## 2021-04-16 RX ORDER — OXYTOCIN-SODIUM CHLORIDE 0.9% IV SOLN 30 UNIT/500ML 30-0.9/5 UT/ML-%
999 SOLUTION INTRAVENOUS ONCE
Status: CANCELLED | OUTPATIENT
Start: 2021-04-16 | End: 2021-04-16

## 2021-04-16 RX ORDER — OXYTOCIN-SODIUM CHLORIDE 0.9% IV SOLN 30 UNIT/500ML 30-0.9/5 UT/ML-%
250 SOLUTION INTRAVENOUS CONTINUOUS PRN
Status: CANCELLED | OUTPATIENT
Start: 2021-04-16 | End: 2021-04-16

## 2021-04-16 RX ORDER — MISOPROSTOL 200 UG/1
800 TABLET ORAL AS NEEDED
Status: CANCELLED | OUTPATIENT
Start: 2021-04-16

## 2021-04-16 RX ORDER — OXYTOCIN-SODIUM CHLORIDE 0.9% IV SOLN 30 UNIT/500ML 30-0.9/5 UT/ML-%
125 SOLUTION INTRAVENOUS CONTINUOUS PRN
Status: CANCELLED | OUTPATIENT
Start: 2021-04-16

## 2021-04-16 RX ORDER — SODIUM CHLORIDE 0.9 % (FLUSH) 0.9 %
3 SYRINGE (ML) INJECTION EVERY 12 HOURS SCHEDULED
Status: CANCELLED | OUTPATIENT
Start: 2021-04-16

## 2021-04-16 RX ORDER — SODIUM CHLORIDE, SODIUM LACTATE, POTASSIUM CHLORIDE, CALCIUM CHLORIDE 600; 310; 30; 20 MG/100ML; MG/100ML; MG/100ML; MG/100ML
125 INJECTION, SOLUTION INTRAVENOUS CONTINUOUS
Status: CANCELLED | OUTPATIENT
Start: 2021-04-16

## 2021-04-16 NOTE — PROGRESS NOTES
Pt presents for routine visit. She notes active fetal movement. She denies reg ctx, vag bleeding or leaking fluid. She reports she is not itching currently. She remains on actigall.    O: BPP 8/8, swathi is 15  A/p: anti-thrombin III def: pt is currently taking heparin and will change back to lovenox following delivery for 8 weeks pp. She will hold heparin prior to surgery.     Cholestasis: symptoms are resolved on actigall but bile acids went up with last level. Still mildly increased. Will proceed with delivery next Monday as recommended by MFM. Pt agrees with plan. Pt is s/p course of betamethasone.    Hx preeclampsia: BP is normal. Pt denies preeclamptic symptoms. She will continue home BP monitoring and preeclamptic warnings

## 2021-04-19 ENCOUNTER — ANESTHESIA (OUTPATIENT)
Dept: LABOR AND DELIVERY | Facility: HOSPITAL | Age: 36
End: 2021-04-19

## 2021-04-19 ENCOUNTER — HOSPITAL ENCOUNTER (INPATIENT)
Facility: HOSPITAL | Age: 36
LOS: 3 days | Discharge: HOME | End: 2021-04-22
Attending: OBSTETRICS & GYNECOLOGY | Admitting: OBSTETRICS & GYNECOLOGY

## 2021-04-19 ENCOUNTER — ANESTHESIA EVENT (OUTPATIENT)
Dept: LABOR AND DELIVERY | Facility: HOSPITAL | Age: 36
End: 2021-04-19

## 2021-04-19 DIAGNOSIS — K83.1 CHOLESTASIS DURING PREGNANCY, ANTEPARTUM: ICD-10-CM

## 2021-04-19 DIAGNOSIS — O26.613 CHOLESTASIS DURING PREGNANCY IN THIRD TRIMESTER: ICD-10-CM

## 2021-04-19 DIAGNOSIS — O26.619 CHOLESTASIS DURING PREGNANCY, ANTEPARTUM: ICD-10-CM

## 2021-04-19 DIAGNOSIS — K83.1 CHOLESTASIS DURING PREGNANCY IN THIRD TRIMESTER: ICD-10-CM

## 2021-04-19 DIAGNOSIS — Z98.891 PREVIOUS CESAREAN SECTION: ICD-10-CM

## 2021-04-19 PROBLEM — O26.649 CHOLESTASIS DURING PREGNANCY: Status: ACTIVE | Noted: 2021-04-19

## 2021-04-19 LAB
ABO GROUP BLD: NORMAL
ALBUMIN SERPL-MCNC: 3.9 G/DL (ref 3.5–5.2)
ALBUMIN/GLOB SERPL: 1.4 G/DL
ALP SERPL-CCNC: 101 U/L (ref 39–117)
ALT SERPL W P-5'-P-CCNC: 32 U/L (ref 1–33)
ANION GAP SERPL CALCULATED.3IONS-SCNC: 12.7 MMOL/L (ref 5–15)
APTT PPP: 27.2 SECONDS (ref 22.7–35.4)
AST SERPL-CCNC: 21 U/L (ref 1–32)
BILIRUB SERPL-MCNC: 0.3 MG/DL (ref 0–1.2)
BLD GP AB SCN SERPL QL: POSITIVE
BUN SERPL-MCNC: 9 MG/DL (ref 6–20)
BUN/CREAT SERPL: 18.4 (ref 7–25)
CALCIUM SPEC-SCNC: 8.8 MG/DL (ref 8.6–10.5)
CHLORIDE SERPL-SCNC: 103 MMOL/L (ref 98–107)
CO2 SERPL-SCNC: 18.3 MMOL/L (ref 22–29)
CREAT SERPL-MCNC: 0.49 MG/DL (ref 0.57–1)
DEPRECATED RDW RBC AUTO: 41 FL (ref 37–54)
ERYTHROCYTE [DISTWIDTH] IN BLOOD BY AUTOMATED COUNT: 13.4 % (ref 12.3–15.4)
GFR SERPL CREATININE-BSD FRML MDRD: 145 ML/MIN/1.73
GLOBULIN UR ELPH-MCNC: 2.8 GM/DL
GLUCOSE SERPL-MCNC: 102 MG/DL (ref 65–99)
HCT VFR BLD AUTO: 39.9 % (ref 34–46.6)
HGB BLD-MCNC: 14 G/DL (ref 12–15.9)
INR PPP: 0.96 (ref 0.9–1.1)
MCH RBC QN AUTO: 30.4 PG (ref 26.6–33)
MCHC RBC AUTO-ENTMCNC: 35.1 G/DL (ref 31.5–35.7)
MCV RBC AUTO: 86.6 FL (ref 79–97)
PLATELET # BLD AUTO: 275 10*3/MM3 (ref 140–450)
PMV BLD AUTO: 10.5 FL (ref 6–12)
POTASSIUM SERPL-SCNC: 4.1 MMOL/L (ref 3.5–5.2)
PROT SERPL-MCNC: 6.7 G/DL (ref 6–8.5)
PROTHROMBIN TIME: 12.6 SECONDS (ref 11.7–14.2)
RBC # BLD AUTO: 4.61 10*6/MM3 (ref 3.77–5.28)
RESIDUAL RHIG DETECTED: NORMAL
RH BLD: NEGATIVE
SARS-COV-2 RNA RESP QL NAA+PROBE: NOT DETECTED
SODIUM SERPL-SCNC: 134 MMOL/L (ref 136–145)
T&S EXPIRATION DATE: NORMAL
WBC # BLD AUTO: 14.62 10*3/MM3 (ref 3.4–10.8)

## 2021-04-19 PROCEDURE — 85610 PROTHROMBIN TIME: CPT | Performed by: OBSTETRICS & GYNECOLOGY

## 2021-04-19 PROCEDURE — 25010000002 ONDANSETRON PER 1 MG: Performed by: NURSE ANESTHETIST, CERTIFIED REGISTERED

## 2021-04-19 PROCEDURE — 86870 RBC ANTIBODY IDENTIFICATION: CPT | Performed by: OBSTETRICS & GYNECOLOGY

## 2021-04-19 PROCEDURE — 59510 CESAREAN DELIVERY: CPT | Performed by: OBSTETRICS & GYNECOLOGY

## 2021-04-19 PROCEDURE — 25010000002 MORPHINE PER 10 MG: Performed by: ANESTHESIOLOGY

## 2021-04-19 PROCEDURE — 86850 RBC ANTIBODY SCREEN: CPT | Performed by: OBSTETRICS & GYNECOLOGY

## 2021-04-19 PROCEDURE — 85027 COMPLETE CBC AUTOMATED: CPT | Performed by: OBSTETRICS & GYNECOLOGY

## 2021-04-19 PROCEDURE — 86901 BLOOD TYPING SEROLOGIC RH(D): CPT | Performed by: OBSTETRICS & GYNECOLOGY

## 2021-04-19 PROCEDURE — 59514 CESAREAN DELIVERY ONLY: CPT | Performed by: OBSTETRICS & GYNECOLOGY

## 2021-04-19 PROCEDURE — 25010000002 ONDANSETRON PER 1 MG: Performed by: ANESTHESIOLOGY

## 2021-04-19 PROCEDURE — 25010000002 GENTAMICIN PER 80 MG: Performed by: OBSTETRICS & GYNECOLOGY

## 2021-04-19 PROCEDURE — 88307 TISSUE EXAM BY PATHOLOGIST: CPT

## 2021-04-19 PROCEDURE — 25010000002 FENTANYL CITRATE (PF) 100 MCG/2ML SOLUTION: Performed by: ANESTHESIOLOGY

## 2021-04-19 PROCEDURE — U0003 INFECTIOUS AGENT DETECTION BY NUCLEIC ACID (DNA OR RNA); SEVERE ACUTE RESPIRATORY SYNDROME CORONAVIRUS 2 (SARS-COV-2) (CORONAVIRUS DISEASE [COVID-19]), AMPLIFIED PROBE TECHNIQUE, MAKING USE OF HIGH THROUGHPUT TECHNOLOGIES AS DESCRIBED BY CMS-2020-01-R: HCPCS | Performed by: OBSTETRICS & GYNECOLOGY

## 2021-04-19 PROCEDURE — 25010000002 HYDROMORPHONE PER 4 MG: Performed by: NURSE ANESTHETIST, CERTIFIED REGISTERED

## 2021-04-19 PROCEDURE — 86900 BLOOD TYPING SEROLOGIC ABO: CPT | Performed by: OBSTETRICS & GYNECOLOGY

## 2021-04-19 PROCEDURE — 80053 COMPREHEN METABOLIC PANEL: CPT | Performed by: OBSTETRICS & GYNECOLOGY

## 2021-04-19 PROCEDURE — 25010000002 PHENYLEPHRINE PER 1 ML: Performed by: NURSE ANESTHETIST, CERTIFIED REGISTERED

## 2021-04-19 PROCEDURE — 85730 THROMBOPLASTIN TIME PARTIAL: CPT | Performed by: OBSTETRICS & GYNECOLOGY

## 2021-04-19 RX ORDER — MORPHINE SULFATE 2 MG/ML
2 INJECTION, SOLUTION INTRAMUSCULAR; INTRAVENOUS
Status: ACTIVE | OUTPATIENT
Start: 2021-04-19 | End: 2021-04-20

## 2021-04-19 RX ORDER — FAMOTIDINE 10 MG/ML
20 INJECTION, SOLUTION INTRAVENOUS ONCE AS NEEDED
Status: COMPLETED | OUTPATIENT
Start: 2021-04-19 | End: 2021-04-19

## 2021-04-19 RX ORDER — NALOXONE HCL 0.4 MG/ML
0.2 VIAL (ML) INJECTION
Status: DISCONTINUED | OUTPATIENT
Start: 2021-04-19 | End: 2021-04-22 | Stop reason: HOSPADM

## 2021-04-19 RX ORDER — DIPHENHYDRAMINE HYDROCHLORIDE 50 MG/ML
25 INJECTION INTRAMUSCULAR; INTRAVENOUS EVERY 4 HOURS PRN
Status: DISCONTINUED | OUTPATIENT
Start: 2021-04-19 | End: 2021-04-22 | Stop reason: HOSPADM

## 2021-04-19 RX ORDER — PROMETHAZINE HYDROCHLORIDE 25 MG/1
25 TABLET ORAL ONCE AS NEEDED
Status: DISCONTINUED | OUTPATIENT
Start: 2021-04-19 | End: 2021-04-22 | Stop reason: HOSPADM

## 2021-04-19 RX ORDER — ONDANSETRON 2 MG/ML
4 INJECTION INTRAMUSCULAR; INTRAVENOUS ONCE AS NEEDED
Status: DISCONTINUED | OUTPATIENT
Start: 2021-04-19 | End: 2021-04-22 | Stop reason: HOSPADM

## 2021-04-19 RX ORDER — FENTANYL CITRATE 50 UG/ML
50 INJECTION, SOLUTION INTRAMUSCULAR; INTRAVENOUS
Status: DISCONTINUED | OUTPATIENT
Start: 2021-04-19 | End: 2021-04-22 | Stop reason: HOSPADM

## 2021-04-19 RX ORDER — OXYCODONE HYDROCHLORIDE AND ACETAMINOPHEN 5; 325 MG/1; MG/1
2 TABLET ORAL EVERY 6 HOURS PRN
Status: DISCONTINUED | OUTPATIENT
Start: 2021-04-19 | End: 2021-04-22 | Stop reason: HOSPADM

## 2021-04-19 RX ORDER — ONDANSETRON 2 MG/ML
4 INJECTION INTRAMUSCULAR; INTRAVENOUS ONCE AS NEEDED
Status: COMPLETED | OUTPATIENT
Start: 2021-04-19 | End: 2021-04-19

## 2021-04-19 RX ORDER — METHYLERGONOVINE MALEATE 0.2 MG/ML
200 INJECTION INTRAVENOUS ONCE AS NEEDED
Status: DISCONTINUED | OUTPATIENT
Start: 2021-04-19 | End: 2021-04-22 | Stop reason: HOSPADM

## 2021-04-19 RX ORDER — IBUPROFEN 600 MG/1
600 TABLET ORAL EVERY 8 HOURS PRN
Status: DISCONTINUED | OUTPATIENT
Start: 2021-04-19 | End: 2021-04-22 | Stop reason: HOSPADM

## 2021-04-19 RX ORDER — FENTANYL CITRATE 50 UG/ML
INJECTION, SOLUTION INTRAMUSCULAR; INTRAVENOUS
Status: COMPLETED | OUTPATIENT
Start: 2021-04-19 | End: 2021-04-19

## 2021-04-19 RX ORDER — MISOPROSTOL 200 UG/1
600 TABLET ORAL ONCE AS NEEDED
Status: DISCONTINUED | OUTPATIENT
Start: 2021-04-19 | End: 2021-04-22 | Stop reason: HOSPADM

## 2021-04-19 RX ORDER — BUPIVACAINE HYDROCHLORIDE 7.5 MG/ML
INJECTION, SOLUTION EPIDURAL; RETROBULBAR
Status: COMPLETED | OUTPATIENT
Start: 2021-04-19 | End: 2021-04-19

## 2021-04-19 RX ORDER — OXYTOCIN-SODIUM CHLORIDE 0.9% IV SOLN 30 UNIT/500ML 30-0.9/5 UT/ML-%
125 SOLUTION INTRAVENOUS CONTINUOUS PRN
Status: COMPLETED | OUTPATIENT
Start: 2021-04-19 | End: 2021-04-19

## 2021-04-19 RX ORDER — METHYLERGONOVINE MALEATE 0.2 MG/ML
200 INJECTION INTRAVENOUS ONCE AS NEEDED
Status: DISCONTINUED | OUTPATIENT
Start: 2021-04-19 | End: 2021-04-19 | Stop reason: HOSPADM

## 2021-04-19 RX ORDER — LANOLIN
CREAM (ML) TOPICAL
Status: DISCONTINUED | OUTPATIENT
Start: 2021-04-19 | End: 2021-04-22 | Stop reason: HOSPADM

## 2021-04-19 RX ORDER — OXYCODONE HYDROCHLORIDE AND ACETAMINOPHEN 5; 325 MG/1; MG/1
1 TABLET ORAL EVERY 4 HOURS PRN
Status: DISCONTINUED | OUTPATIENT
Start: 2021-04-19 | End: 2021-04-22 | Stop reason: HOSPADM

## 2021-04-19 RX ORDER — DIPHENHYDRAMINE HCL 25 MG
25 CAPSULE ORAL
Status: DISCONTINUED | OUTPATIENT
Start: 2021-04-19 | End: 2021-04-22 | Stop reason: HOSPADM

## 2021-04-19 RX ORDER — ONDANSETRON 2 MG/ML
INJECTION INTRAMUSCULAR; INTRAVENOUS AS NEEDED
Status: DISCONTINUED | OUTPATIENT
Start: 2021-04-19 | End: 2021-04-19 | Stop reason: SURG

## 2021-04-19 RX ORDER — DIPHENHYDRAMINE HYDROCHLORIDE 50 MG/ML
12.5 INJECTION INTRAMUSCULAR; INTRAVENOUS
Status: DISCONTINUED | OUTPATIENT
Start: 2021-04-19 | End: 2021-04-22 | Stop reason: HOSPADM

## 2021-04-19 RX ORDER — DIPHENHYDRAMINE HCL 25 MG
25 CAPSULE ORAL EVERY 4 HOURS PRN
Status: DISCONTINUED | OUTPATIENT
Start: 2021-04-19 | End: 2021-04-22 | Stop reason: HOSPADM

## 2021-04-19 RX ORDER — ACETAMINOPHEN 325 MG/1
650 TABLET ORAL EVERY 6 HOURS PRN
COMMUNITY
End: 2021-04-22 | Stop reason: HOSPADM

## 2021-04-19 RX ORDER — HYDRALAZINE HYDROCHLORIDE 20 MG/ML
5 INJECTION INTRAMUSCULAR; INTRAVENOUS
Status: DISCONTINUED | OUTPATIENT
Start: 2021-04-19 | End: 2021-04-22 | Stop reason: HOSPADM

## 2021-04-19 RX ORDER — PRENATAL VIT/IRON FUM/FOLIC AC 27MG-0.8MG
1 TABLET ORAL DAILY
Status: DISCONTINUED | OUTPATIENT
Start: 2021-04-19 | End: 2021-04-22 | Stop reason: HOSPADM

## 2021-04-19 RX ORDER — OXYTOCIN-SODIUM CHLORIDE 0.9% IV SOLN 30 UNIT/500ML 30-0.9/5 UT/ML-%
250 SOLUTION INTRAVENOUS CONTINUOUS PRN
Status: ACTIVE | OUTPATIENT
Start: 2021-04-19 | End: 2021-04-19

## 2021-04-19 RX ORDER — HYDROMORPHONE HYDROCHLORIDE 1 MG/ML
0.5 INJECTION, SOLUTION INTRAMUSCULAR; INTRAVENOUS; SUBCUTANEOUS
Status: DISCONTINUED | OUTPATIENT
Start: 2021-04-19 | End: 2021-04-19 | Stop reason: HOSPADM

## 2021-04-19 RX ORDER — MORPHINE SULFATE 1 MG/ML
INJECTION, SOLUTION EPIDURAL; INTRATHECAL; INTRAVENOUS
Status: COMPLETED | OUTPATIENT
Start: 2021-04-19 | End: 2021-04-19

## 2021-04-19 RX ORDER — CARBOPROST TROMETHAMINE 250 UG/ML
250 INJECTION, SOLUTION INTRAMUSCULAR AS NEEDED
Status: DISCONTINUED | OUTPATIENT
Start: 2021-04-19 | End: 2021-04-19 | Stop reason: HOSPADM

## 2021-04-19 RX ORDER — DOCUSATE SODIUM 100 MG/1
100 CAPSULE, LIQUID FILLED ORAL 2 TIMES DAILY
Status: DISCONTINUED | OUTPATIENT
Start: 2021-04-19 | End: 2021-04-22 | Stop reason: HOSPADM

## 2021-04-19 RX ORDER — PROMETHAZINE HYDROCHLORIDE 25 MG/1
25 SUPPOSITORY RECTAL ONCE AS NEEDED
Status: DISCONTINUED | OUTPATIENT
Start: 2021-04-19 | End: 2021-04-22 | Stop reason: HOSPADM

## 2021-04-19 RX ORDER — EPHEDRINE SULFATE 50 MG/ML
5 INJECTION, SOLUTION INTRAVENOUS ONCE AS NEEDED
Status: DISCONTINUED | OUTPATIENT
Start: 2021-04-19 | End: 2021-04-22 | Stop reason: HOSPADM

## 2021-04-19 RX ORDER — SIMETHICONE 80 MG
80 TABLET,CHEWABLE ORAL 4 TIMES DAILY PRN
Status: DISCONTINUED | OUTPATIENT
Start: 2021-04-19 | End: 2021-04-22 | Stop reason: HOSPADM

## 2021-04-19 RX ORDER — SODIUM CHLORIDE 0.9 % (FLUSH) 0.9 %
10 SYRINGE (ML) INJECTION AS NEEDED
Status: DISCONTINUED | OUTPATIENT
Start: 2021-04-19 | End: 2021-04-19

## 2021-04-19 RX ORDER — ACETAMINOPHEN 500 MG
1000 TABLET ORAL ONCE
Status: COMPLETED | OUTPATIENT
Start: 2021-04-19 | End: 2021-04-19

## 2021-04-19 RX ORDER — LABETALOL HYDROCHLORIDE 5 MG/ML
5 INJECTION, SOLUTION INTRAVENOUS
Status: DISCONTINUED | OUTPATIENT
Start: 2021-04-19 | End: 2021-04-22 | Stop reason: HOSPADM

## 2021-04-19 RX ORDER — SODIUM CHLORIDE 0.9 % (FLUSH) 0.9 %
3 SYRINGE (ML) INJECTION EVERY 12 HOURS SCHEDULED
Status: DISCONTINUED | OUTPATIENT
Start: 2021-04-19 | End: 2021-04-19

## 2021-04-19 RX ORDER — OXYCODONE AND ACETAMINOPHEN 7.5; 325 MG/1; MG/1
1 TABLET ORAL ONCE AS NEEDED
Status: COMPLETED | OUTPATIENT
Start: 2021-04-19 | End: 2021-04-19

## 2021-04-19 RX ORDER — MISOPROSTOL 200 UG/1
800 TABLET ORAL AS NEEDED
Status: DISCONTINUED | OUTPATIENT
Start: 2021-04-19 | End: 2021-04-19 | Stop reason: HOSPADM

## 2021-04-19 RX ORDER — SODIUM CHLORIDE, SODIUM LACTATE, POTASSIUM CHLORIDE, CALCIUM CHLORIDE 600; 310; 30; 20 MG/100ML; MG/100ML; MG/100ML; MG/100ML
125 INJECTION, SOLUTION INTRAVENOUS CONTINUOUS
Status: DISCONTINUED | OUTPATIENT
Start: 2021-04-19 | End: 2021-04-19

## 2021-04-19 RX ORDER — CLINDAMYCIN PHOSPHATE 900 MG/50ML
900 INJECTION INTRAVENOUS ONCE
Status: COMPLETED | OUTPATIENT
Start: 2021-04-19 | End: 2021-04-19

## 2021-04-19 RX ORDER — HYDROCODONE BITARTRATE AND ACETAMINOPHEN 7.5; 325 MG/1; MG/1
1 TABLET ORAL ONCE AS NEEDED
Status: DISCONTINUED | OUTPATIENT
Start: 2021-04-19 | End: 2021-04-22 | Stop reason: HOSPADM

## 2021-04-19 RX ORDER — FLUMAZENIL 0.1 MG/ML
0.2 INJECTION INTRAVENOUS AS NEEDED
Status: DISCONTINUED | OUTPATIENT
Start: 2021-04-19 | End: 2021-04-22 | Stop reason: HOSPADM

## 2021-04-19 RX ORDER — NALOXONE HCL 0.4 MG/ML
0.2 VIAL (ML) INJECTION AS NEEDED
Status: DISCONTINUED | OUTPATIENT
Start: 2021-04-19 | End: 2021-04-22 | Stop reason: HOSPADM

## 2021-04-19 RX ORDER — OXYTOCIN-SODIUM CHLORIDE 0.9% IV SOLN 30 UNIT/500ML 30-0.9/5 UT/ML-%
999 SOLUTION INTRAVENOUS ONCE
Status: COMPLETED | OUTPATIENT
Start: 2021-04-19 | End: 2021-04-19

## 2021-04-19 RX ORDER — EPHEDRINE SULFATE 50 MG/ML
INJECTION, SOLUTION INTRAVENOUS AS NEEDED
Status: DISCONTINUED | OUTPATIENT
Start: 2021-04-19 | End: 2021-04-19 | Stop reason: SURG

## 2021-04-19 RX ORDER — HYDROMORPHONE HYDROCHLORIDE 1 MG/ML
0.5 INJECTION, SOLUTION INTRAMUSCULAR; INTRAVENOUS; SUBCUTANEOUS
Status: DISCONTINUED | OUTPATIENT
Start: 2021-04-19 | End: 2021-04-22 | Stop reason: HOSPADM

## 2021-04-19 RX ADMIN — ONDANSETRON HYDROCHLORIDE 4 MG: 2 SOLUTION INTRAMUSCULAR; INTRAVENOUS at 07:37

## 2021-04-19 RX ADMIN — CLINDAMYCIN IN 5 PERCENT DEXTROSE 900 MG: 18 INJECTION, SOLUTION INTRAVENOUS at 07:11

## 2021-04-19 RX ADMIN — ONDANSETRON 4 MG: 2 INJECTION INTRAMUSCULAR; INTRAVENOUS at 06:46

## 2021-04-19 RX ADMIN — IBUPROFEN 600 MG: 600 TABLET ORAL at 22:04

## 2021-04-19 RX ADMIN — HYDROMORPHONE HYDROCHLORIDE 0.5 MG: 1 INJECTION, SOLUTION INTRAMUSCULAR; INTRAVENOUS; SUBCUTANEOUS at 10:00

## 2021-04-19 RX ADMIN — PHENYLEPHRINE HYDROCHLORIDE 100 MCG: 10 INJECTION INTRAVENOUS at 07:45

## 2021-04-19 RX ADMIN — FENTANYL CITRATE 15 MCG: 50 INJECTION INTRAMUSCULAR; INTRAVENOUS at 07:35

## 2021-04-19 RX ADMIN — HYDROMORPHONE HYDROCHLORIDE 0.5 MG: 1 INJECTION, SOLUTION INTRAMUSCULAR; INTRAVENOUS; SUBCUTANEOUS at 10:31

## 2021-04-19 RX ADMIN — HYDROMORPHONE HYDROCHLORIDE 0.5 MG: 1 INJECTION, SOLUTION INTRAMUSCULAR; INTRAVENOUS; SUBCUTANEOUS at 09:30

## 2021-04-19 RX ADMIN — OXYTOCIN 999 ML/HR: 10 INJECTION INTRAVENOUS at 08:04

## 2021-04-19 RX ADMIN — OXYCODONE HYDROCHLORIDE AND ACETAMINOPHEN 1 TABLET: 5; 325 TABLET ORAL at 18:09

## 2021-04-19 RX ADMIN — BUPIVACAINE HYDROCHLORIDE 1.6 ML: 7.5 INJECTION, SOLUTION EPIDURAL; RETROBULBAR at 07:35

## 2021-04-19 RX ADMIN — OXYTOCIN 125 ML/HR: 10 INJECTION INTRAVENOUS at 09:33

## 2021-04-19 RX ADMIN — FAMOTIDINE 20 MG: 10 INJECTION INTRAVENOUS at 06:43

## 2021-04-19 RX ADMIN — OXYCODONE HYDROCHLORIDE AND ACETAMINOPHEN 1 TABLET: 5; 325 TABLET ORAL at 13:25

## 2021-04-19 RX ADMIN — SODIUM CHLORIDE, POTASSIUM CHLORIDE, SODIUM LACTATE AND CALCIUM CHLORIDE 125 ML/HR: 600; 310; 30; 20 INJECTION, SOLUTION INTRAVENOUS at 06:42

## 2021-04-19 RX ADMIN — GENTAMICIN SULFATE 350 MG: 40 INJECTION, SOLUTION INTRAMUSCULAR; INTRAVENOUS at 07:10

## 2021-04-19 RX ADMIN — IBUPROFEN 600 MG: 600 TABLET ORAL at 12:48

## 2021-04-19 RX ADMIN — OXYCODONE HYDROCHLORIDE AND ACETAMINOPHEN 1 TABLET: 7.5; 325 TABLET ORAL at 22:04

## 2021-04-19 RX ADMIN — SIMETHICONE 80 MG: 80 TABLET, CHEWABLE ORAL at 21:24

## 2021-04-19 RX ADMIN — EPHEDRINE SULFATE 10 MG: 50 INJECTION INTRAVENOUS at 07:40

## 2021-04-19 RX ADMIN — MORPHINE SULFATE 100 MCG: 1 INJECTION, SOLUTION EPIDURAL; INTRATHECAL; INTRAVENOUS at 07:35

## 2021-04-19 RX ADMIN — ACETAMINOPHEN 1000 MG: 500 TABLET, FILM COATED ORAL at 07:16

## 2021-04-19 RX ADMIN — DOCUSATE SODIUM 100 MG: 100 CAPSULE, LIQUID FILLED ORAL at 22:03

## 2021-04-19 RX ADMIN — SODIUM CHLORIDE, POTASSIUM CHLORIDE, SODIUM LACTATE AND CALCIUM CHLORIDE 1000 ML: 600; 310; 30; 20 INJECTION, SOLUTION INTRAVENOUS at 05:20

## 2021-04-19 NOTE — ANESTHESIA PROCEDURE NOTES
Spinal Block      Patient reassessed immediately prior to procedure    Patient location during procedure: OR  Start Time: 4/19/2021 7:30 AM  Stop Time: 4/19/2021 7:35 AM  Indication:at surgeon's request and procedure for pain  Performed By  CRNA: Emelyn Luis CRNA  Preanesthetic Checklist  Completed: patient identified, IV checked, site marked, risks and benefits discussed, surgical consent, monitors and equipment checked, pre-op evaluation and timeout performed  Spinal Block Prep:  Patient Position:sitting  Sterile Tech:cap, gloves, sterile barriers and mask  Prep:Chloraprep  Patient Monitoring:EKG, continuous pulse oximetry and blood pressure monitoring  Spinal Block Procedure  Approach:midline  Guidance:palpation technique  Location:L4-L5  Needle Type:Dominique  Needle Gauge:25 G  Placement of Spinal needle event:cerebrospinal fluid aspirated  Paresthesia: no  Fluid Appearance:clear  Medications: fentaNYL citrate (PF) (SUBLIMAZE) injection, 15 mcg  bupivacaine PF (MARCAINE) 0.75 % injection, 1.6 mL  Morphine PF injection, 100 mcg  Med Administered at 4/19/2021 7:35 AM   Post Assessment  Patient Tolerance:patient tolerated the procedure well with no apparent complications  Complications no

## 2021-04-19 NOTE — PLAN OF CARE
Problem: Adult Inpatient Plan of Care  Goal: Plan of Care Review  Outcome: Ongoing, Progressing  Flowsheets (Taken 2021 1008)  Progress: improving  Plan of Care Reviewed With:   patient   spouse  Outcome Summary: Admitted for Scheduled Repeat C/S this 35 y/o  at 36 2/7 wks gestation due to Cholestatis and Antithrombin 3 deficiency.  C/S was performed with Spinal anesthesia and delivery of viable infant,  cc, FF@U with light-scant lochia, Dilaudid 1.0 mg IVP has been given at this point due to C/O incisional pain.  Goal: Patient-Specific Goal (Individualized)  Outcome: Ongoing, Progressing  Goal: Absence of Hospital-Acquired Illness or Injury  Outcome: Ongoing, Progressing  Intervention: Prevent Skin Injury  Recent Flowsheet Documentation  Taken 2021 1005 by Noreen Corona RN  Body Position: sitting up in bed  Taken 2021 0951 by Noreen Corona RN  Body Position: sitting up in bed  Taken 2021 0949 by Noreen Corona RN  Body Position: side-lying, right  Taken 2021 0936 by Noreen Corona RN  Body Position: sitting up in bed  Taken 2021 0904 by Noreen Corona RN  Body Position: sitting up in bed  Taken 2021 0620 by Noreen Corona RN  Body Position: sitting up in bed  Intervention: Prevent and Manage VTE (venous thromboembolism) Risk  Recent Flowsheet Documentation  Taken 2021 1005 by Noreen Corona RN  VTE Prevention/Management:   bilateral   sequential compression devices on  Taken 2021 0951 by Noreen Corona RN  VTE Prevention/Management:   bilateral   sequential compression devices on  Taken 2021 0936 by Noreen Corona RN  VTE Prevention/Management:   bilateral   sequential compression devices on  Taken 2021 0921 by Noreen Corona RN  VTE Prevention/Management:   bilateral   sequential compression devices on  Taken 2021 0904 by Noreen Corona RN  VTE Prevention/Management:   bilateral   sequential compression  devices on  Taken 2021 0620 by Noreen Corona RN  VTE Prevention/Management: (I will place SCD's in the OR) other (see comments)  Goal: Optimal Comfort and Wellbeing  Outcome: Ongoing, Progressing  Intervention: Provide Person-Centered Care  Recent Flowsheet Documentation  Taken 2021 0949 by Noreen Corona RN  Trust Relationship/Rapport:   care explained   choices provided   questions answered   questions encouraged   reassurance provided  Taken 2021 0904 by Noreen Corona RN  Trust Relationship/Rapport:   care explained   questions answered   questions encouraged   reassurance provided  Taken 2021 0620 by Noreen Corona RN  Trust Relationship/Rapport:   care explained   choices provided   questions answered   questions encouraged   reassurance provided  Goal: Readiness for Transition of Care  Outcome: Ongoing, Progressing     Problem: Bleeding ( Delivery)  Goal: Bleeding is Controlled  Outcome: Ongoing, Progressing     Problem: Change in Fetal Wellbeing ( Delivery)  Goal: Stable Fetal Wellbeing  Outcome: Ongoing, Progressing  Intervention: Promote and Monitor Fetal Wellbeing  Recent Flowsheet Documentation  Taken 2021 1005 by Noreen Corona RN  Body Position: sitting up in bed  Taken 2021 0951 by Noreen Corona RN  Body Position: sitting up in bed  Taken 2021 0949 by Noreen Corona RN  Body Position: side-lying, right  Taken 2021 0936 by Noreen Corona RN  Body Position: sitting up in bed  Taken 2021 0904 by Noreen Corona RN  Body Position: sitting up in bed  Taken 2021 0620 by Noreen Corona RN  Body Position: sitting up in bed     Problem: Infection ( Delivery)  Goal: Absence of Infection Signs and Symptoms  Outcome: Ongoing, Progressing     Problem: Respiratory Compromise ( Delivery)  Goal: Effective Oxygenation and Ventilation  Outcome: Ongoing, Progressing     Problem: Device-Related Complication  Risk (Anesthesia/Analgesia, Neuraxial)  Goal: Safe Infusion Delivery Completion  Outcome: Ongoing, Progressing     Problem: Infection (Anesthesia/Analgesia, Neuraxial)  Goal: Absence of Infection Signs and Symptoms  Outcome: Ongoing, Progressing     Problem: Nausea and Vomiting (Anesthesia/Analgesia, Neuraxial)  Goal: Nausea and Vomiting Relief  Outcome: Ongoing, Progressing     Problem: Pain (Anesthesia/Analgesia, Neuraxial)  Goal: Effective Pain Control  Outcome: Ongoing, Progressing  Intervention: Prevent or Manage Pain  Recent Flowsheet Documentation  Taken 2021 1005 by Noreen Corona RN  Pain Management Interventions: see MAR  Taken 2021 0949 by Noreen Corona RN  Pain Management Interventions: see MAR  Taken 2021 0904 by Noreen Corona RN  Pain Management Interventions: no interventions per patient request     Problem: Respiratory Compromise (Anesthesia/Analgesia, Neuraxial)  Goal: Effective Oxygenation and Ventilation  Outcome: Ongoing, Progressing  Intervention: Optimize Oxygenation and Ventilation  Recent Flowsheet Documentation  Taken 2021 1005 by Noreen Corona RN  Head of Bed (HOB): HOB at 20-30 degrees  Taken 2021 0951 by Noreen Corona RN  Head of Bed (HOB): HOB at 20-30 degrees  Taken 2021 0949 by Noreen Corona RN  Head of Bed (HOB): HOB at 20 degrees  Taken 2021 0936 by Noreen Corona RN  Head of Bed (HOB): HOB at 20-30 degrees  Taken 2021 0904 by Noreen Corona RN  Head of Bed (HOB): HOB at 20-30 degrees  Taken 2021 0620 by Noreen Corona RN  Head of Bed (HOB): HOB at 90 degrees     Problem: Sensorimotor Impairment (Anesthesia/Analgesia, Neuraxial)  Goal: Baseline Motor Function  Outcome: Ongoing, Progressing     Problem: Urinary Retention (Anesthesia/Analgesia, Neuraxial)  Goal: Effective Urinary Elimination  Outcome: Ongoing, Progressing     Problem:  Fall Injury Risk  Goal: Absence of Fall, Infant Drop and Related  Injury  Outcome: Ongoing, Progressing     Problem: Skin Injury Risk Increased  Goal: Skin Health and Integrity  Outcome: Ongoing, Progressing  Intervention: Optimize Skin Protection  Recent Flowsheet Documentation  Taken 2021 1005 by Noreen Corona RN  Head of Bed (HOB): HOB at 20-30 degrees  Taken 2021 0951 by Noreen Corona RN  Head of Bed (HOB): HOB at 20-30 degrees  Taken 2021 0949 by Noreen Corona RN  Head of Bed (HOB): HOB at 20 degrees  Taken 2021 0936 by Noreen Corona RN  Head of Bed (HOB): HOB at 20-30 degrees  Taken 2021 0904 by Noreen Corona RN  Head of Bed (HOB): HOB at 20-30 degrees  Taken 2021 0620 by Noreen Corona RN  Head of Bed (HOB): HOB at 90 degrees   Goal Outcome Evaluation:  Plan of Care Reviewed With: patient, spouse  Progress: improving  Outcome Summary: Admitted for Scheduled Repeat C/S this 37 y/o  at 36 2/7 wks gestation due to Cholestatis and Antithrombin 3 deficiency.  C/S was performed with Spinal anesthesia and delivery of viable infant,  cc, FF@U with light-scant lochia, Dilaudid 1.0 mg IVP has been given at this point due to C/O incisional pain.

## 2021-04-19 NOTE — LACTATION NOTE
This note was copied from a baby's chart.  P2, 36w2d. Mother reports that infant latched very well after delivery, now is too sleepy and unable to get infant to feed. Attempted to assist with rousing infant, infant will no suckle, even with finger feeding. Encouraged skin to skin and hand expression, discussed attempting every 2-3 hours and PRN 15 min per side, hand express into infant mouth if not able to latch/suckle. Set up HGP and provided hand pump, discussed use/cleaning, mother familiar with pumping from previous child being in NICU. Discussed late  behaviors, when to expect milk to come in, and how to know baby is getting enough. Mother has a spectra at home from previous delivery, thinking about getting the freemie or other hands free pump this time. Encouraged to call as needed.

## 2021-04-19 NOTE — ANESTHESIA POSTPROCEDURE EVALUATION
Patient: Carey Armando    Procedure Summary     Date: 21 Room / Location:  [facility]  /  [facility]    Anesthesia Start: 728 Anesthesia Stop: 907    Procedure:  SECTION REPEAT (N/A Abdomen) Diagnosis:       Hx of preeclampsia, prior pregnancy, currently pregnant      Antithrombin 3 deficiency (CMS/HCC)      (Hx of preeclampsia, prior pregnancy, currently pregnant [O09.299])      (Antithrombin 3 deficiency (CMS/HCC) [D68.59])    Surgeons: Rea Stone MD Provider: Ravi Lovett MD    Anesthesia Type: spinal ASA Status: 3          Anesthesia Type: spinal    Vitals  Vitals Value Taken Time   /72 21 1105   Temp 36.5 °C (97.7 °F) 21 1021   Pulse 81 21 1106   Resp 20 21 1105   SpO2 100 % 21 1105           Post Anesthesia Care and Evaluation    Patient location during evaluation: bedside  Patient participation: complete - patient participated  Level of consciousness: awake  Pain management: adequate  Airway patency: patent  Anesthetic complications: No anesthetic complications  PONV Status: none  Cardiovascular status: acceptable  Respiratory status: acceptable  Hydration status: acceptable  Post Neuraxial Block status: Motor and sensory function returned to baseline

## 2021-04-19 NOTE — H&P
[facility]  Obstetric History and Physical    Chief Complaint   Patient presents with   • Scheduled      here for repeat c/s, h/o antithrombin 3 - took Lovenox and heparin; cholestasis this pregnancy and last       Subjective     Patient is a 36 y.o. female  currently at 36w2d, who presents for scheduled  delivery. She has a history of prior  and was planning repeat  delivery at term. However, patient developed cholestasis again this pregnancy. Reviewed her case with ERWIN MULTANI, Dr. Adler, who recommended proceeding with delivery at 36 to 37 weeks due to cholestasis with other complications of Antithrombin III def with history of  severe preeclampsia. Risks and benefits were reviewed and patient agreed with this plan understanding risks of prematurity for her infant. She has received a course of betamethasone 1 week ago. She notes normal fetal movement. She denies ctx, vaginal bleeding or leaking fluid. She denies fever, cough, shortness of air or calf pain. She understands risks of  include bleeding, transfusion, infection, damage to internal organs, need for additional surgeries due to complications, anesthetic complications, DVT/PE and death.     Her prenatal care is complicated by .  Patient Active Problem List   Diagnosis   • Rh negative state in antepartum period   • Fibroid   • Cholestasis during pregnancy in third trimester   • Antithrombin 3 deficiency (CMS/HCC)   • Hx of preeclampsia, prior pregnancy, currently pregnant   • Pregnancy resulting from assisted reproductive technology, antepartum   • Family history of congenital anomaly   • Cholestasis of pregnancy in third trimester   • Pregnancy   • Cholestasis during pregnancy   .  Her previous obstetric/gynecological history is noted for prior   delivery for severe preeclampsia. Patient had cholestasis that pregnancy as well.    The following portions of the patients history were  reviewed and updated as appropriate: current medications, allergies, past medical history, past surgical history, past family history, past social history and problem list .       Prenatal Information:  Prenatal Results     POC Urine Glucose/Protein     Test Value Reference Range Date Time    Urine Glucose  Negative mg/dL Negative, 1000 mg/dL (3+) 04/16/21 1417    Urine Protein  Negative mg/dL Negative 04/16/21 1417          Initial Prenatal Labs     Test Value Reference Range Date Time    Hemoglobin  12.4 g/dL 12.0 - 15.9 11/10/20 1103       13.9 g/dL 11.1 - 15.9 10/23/20 1232    Hematocrit  37.2 % 34.0 - 46.6 11/10/20 1103       40.1 % 34.0 - 46.6 10/23/20 1232    Platelets  275 10*3/mm3 140 - 450 04/19/21 0528       299 10*3/mm3 140 - 450 04/13/21 1345       281 10*3/mm3 140 - 450 04/06/21 1354       250 10*3/mm3 140 - 450 04/03/21 0942       221 10*3/mm3 140 - 450 03/23/21 1106       239 10*3/mm3 140 - 450 03/08/21 1033       238 10*3/mm3 140 - 450 02/02/21 1118       260 10*3/mm3 140 - 450 01/12/21 1027       246 10*3/mm3 140 - 450 11/10/20 1103       292 x10E3/uL 150 - 450 10/23/20 1232    Rubella IgG  9.20 index Immune >0.99 10/23/20 1232    Hepatitis B SAg  Negative  Negative 10/23/20 1232    Hepatitis C Ab  <0.1 s/co ratio 0.0 - 0.9 10/23/20 1232    RPR  Non Reactive  Non Reactive 10/23/20 1232    ABO  O   04/19/21 0528    Rh  Negative   04/19/21 0528    Antibody Screen  Negative  Negative 10/23/20 1232    HIV  Non Reactive  Non Reactive 10/23/20 1232    Urine Culture  Final report   10/23/20 1139    Gonorrhea        Chlamydia        TSH  1.240 uIU/mL 0.450 - 4.500 10/23/20 1232          2nd and 3rd Trimester     Test Value Reference Range Date Time    Hemoglobin (repeated)  14.0 g/dL 12.0 - 15.9 04/19/21 0528       12.9 g/dL 12.0 - 15.9 04/13/21 1345       13.3 g/dL 12.0 - 15.9 04/06/21 1354       12.2 g/dL 12.0 - 15.9 04/03/21 0942       13.1 g/dL 12.0 - 15.9 03/23/21 1106       12.7 g/dL 12.0 - 15.9  03/08/21 1033       11.4 g/dL 12.0 - 15.9 02/02/21 1118       12.2 g/dL 12.0 - 15.9 01/12/21 1027    Hematocrit (repeated)  39.9 % 34.0 - 46.6 04/19/21 0528       35.9 % 34.0 - 46.6 04/13/21 1345       37.6 % 34.0 - 46.6 04/06/21 1354       34.6 % 34.0 - 46.6 04/03/21 0942       37.6 % 34.0 - 46.6 03/23/21 1106       37.7 % 34.0 - 46.6 03/08/21 1033       33.1 % 34.0 - 46.6 02/02/21 1118       36.0 % 34.0 - 46.6 01/12/21 1027    GCT  103 mg/dL 65 - 139 02/02/21 1118    Antibody Screen (repeated)  Positive   04/19/21 0528       Negative  Negative 02/23/21 1609       Negative  Negative 02/02/21 1118    GTT Fasting        GTT 1 Hr        GTT 2 Hr        GTT 3 Hr        Group B Strep  Negative  Negative 04/13/21 1335          Drug Screening     Test Value Reference Range Date Time    Amphetamine Screen        Barbiturate Screen        Benzodiazepine Screen        Methadone Screen        Phencyclidine Screen        Opiates Screen        THC Screen        Cocaine Screen        Propoxyphene Screen        Buprenorphine Screen        Methamphetamine Screen        Oxycodone Screen        Tricyclic Antidepressants Screen              Other (Risk screening)     Test Value Reference Range Date Time    Varicella IgG ^ + history   11/10/20     Parvovirus IgG        CMV IgG        Cystic Fibrosis ^ neg per pt   11/10/20     Hemoglobin electrophoresis        NIPT ^ low risk   10/23/20     MSAFP-4        AFP (for NTD only)  *Screen Negative*   12/01/20 1048          Legend    ^: Historical                      External Prenatal Results     Pregnancy Outside Results - Transcribed From Office Records - See Scanned Records For Details     Test Value Date Time    Hgb  14.0 g/dL 04/19/21 0528       12.9 g/dL 04/13/21 1345       13.3 g/dL 04/06/21 1354       12.2 g/dL 04/03/21 0942       13.1 g/dL 03/23/21 1106       12.7 g/dL 03/08/21 1033       11.4 g/dL 02/02/21 1118       12.2 g/dL 01/12/21 1027       12.4 g/dL 11/10/20 1103       13.9  g/dL 10/23/20 1232    Hct  39.9 % 21 0528       35.9 % 21 1345       37.6 % 21 1354       34.6 % 21 0942       37.6 % 21 1106       37.7 % 21 1033       33.1 % 21 1118       36.0 % 21 1027       37.2 % 11/10/20 1103       40.1 % 10/23/20 1232    ABO  O  21 0528    Rh  Negative  21 0528    Antibody Screen  Positive  21 0528       Negative  21 1609       Negative  21 1118       Negative  10/23/20 1232    Glucose Fasting GTT       Glucose Tolerance Test 1 hour       Glucose Tolerance Test 3 hour       Gonorrhea (discrete)       Chlamydia (discrete)       RPR  Non Reactive  10/23/20 1232    VDRL       Syphilis Antibody       Rubella  9.20 index 10/23/20 1232    HBsAg  Negative  10/23/20 1232    Herpes Simplex Virus PCR       Herpes Simplex VIrus Culture       HIV  Non Reactive  10/23/20 1232    Hep C RNA Quant PCR       Hep C Antibody  <0.1 s/co ratio 10/23/20 1232    AFP  28.7 ng/mL 20 1048    Group B Strep  Negative  21 1335    GBS Susceptibility to Clindamycin       GBS Susceptibility to Erythromycin       Fetal Fibronectin       Genetic Testing, Maternal Blood             Drug Screening     Test Value Date Time    Urine Drug Screen       Amphetamine Screen       Barbiturate Screen       Benzodiazepine Screen       Methadone Screen       Phencyclidine Screen       Opiates Screen       THC Screen       Cocaine Screen       Propoxyphene Screen       Buprenorphine Screen       Methamphetamine Screen       Oxycodone Screen       Tricyclic Antidepressants Screen             Legend    ^: Historical                         Past OB History:     OB History    Para Term  AB Living   2 1 0 1 0 1   SAB TAB Ectopic Molar Multiple Live Births   0 0 0 0 0 1      # Outcome Date GA Lbr Ranjit/2nd Weight Sex Delivery Anes PTL Lv   2 Current            1  19 33w4d  2355 g (5 lb 3.1 oz) M CS-LTranv Spinal N SHANE      Birth  Comments: Issa gaona OR1      Name: JULIANA VALLEJO      Apgar1: 8  Apgar5: 9       Past Medical History: Past Medical History:   Diagnosis Date   • Antithrombin III deficiency (CMS/HCC)    • Cholestasis    • Fibroid    • H/O foreign travel 2018    [place]   • Infectious mononucleosis    • Gary product of in vitro fertilization (IVF) pregnancy 2018   • PIH (pregnancy induced hypertension)       Past Surgical History Past Surgical History:   Procedure Laterality Date   •  SECTION N/A 2019    Procedure:  SECTION PRIMARY;  Surgeon: Julio César Leal MD;  Location: [facility] LABOR DELIVERY;  Service: Obstetrics/Gynecology   • LASIK     • WISDOM TOOTH EXTRACTION        Family History: Family History   Problem Relation Age of Onset   • Colon cancer Father 54   • Hypertension Father    • Breast cancer Maternal Grandmother         postmenopausal   • Prostate cancer Paternal Uncle    • Deep vein thrombosis Neg Hx    • Pulmonary embolism Neg Hx       Social History:  reports that she has never smoked. She has never used smokeless tobacco.   reports no history of alcohol use.   reports no history of drug use.        General ROS: Pertinent items are noted in HPI    Objective       Vital Signs Range for the last 24 hours  Temperature: Temp:  [97.8 °F (36.6 °C)] 97.8 °F (36.6 °C)   Temp Source: Temp src: Oral   BP: BP: (119)/(83) 119/83   Pulse: Heart Rate:  [71] 71   Respirations: Resp:  [18] 18   SPO2:     O2 Amount (l/min):     O2 Devices     Weight: Weight:  [84.8 kg (187 lb)] 84.8 kg (187 lb)     Physical Examination: General appearance - alert, well appearing, and in no distress  Mental status - alert, oriented to person, place, and time  Chest - clear to auscultation, no wheezes, rales or rhonchi, symmetric air entry  Heart - normal rate and regular rhythm  Abdomen - gravid, nontender  Neurological - alert, oriented, normal speech, no focal findings or movement disorder noted  Extremities -  bilateral lower extremities with trace edema, no cords or tenderness  Skin - normal coloration and turgor    Presentation: def   Cervix:      Dilation:     Effacement:     Station:         Fetal Heart Rate Assessment   Method: Fetal HR Assessment Method: external   Beats/min: Fetal HR (beats/min): 145   Baseline: Fetal Heart Baseline Rate: normal range   Variability: Fetal HR Variability: moderate (amplitude range 6 to 25 bpm)   Accels: Fetal HR Accelerations: absent   Decels: Fetal HR Decelerations: absent   Tracing Category:       Uterine Assessment   Method: Method: palpation, external tocotransducer   Frequency (min): Contraction Frequency (Minutes): x2   Ctx Count in 10 min:     Duration:     Intensity: Contraction Intensity: no contractions   Intensity by IUPC:     Resting Tone: Uterine Resting Tone: soft by palpation   Resting Tone by IUPC:     [place] Units:         Assessment/Plan       Rh negative state in antepartum period    Fibroid    Antithrombin 3 deficiency (CMS/HCC)    Hx of preeclampsia, prior pregnancy, currently pregnant    Pregnancy resulting from assisted reproductive technology, antepartum    Cholestasis during pregnancy        Assessment:  1.  Intrauterine pregnancy at 36w2d gestation with reactive fetal status.    2.  Patient presents for repeat  delivery  3.  Obstetrical history significant for prior cesaeran, history of severe preeclampsia, history of cholestasis and anti-thrombin III def  4.  GBS status:   Strep Gp B VERO   Date Value Ref Range Status   2021 Negative Negative Final     Comment:     Centers for Disease Control and Prevention (CDC) and American Congress  of Obstetricians and Gynecologists (ACOG) guidelines for prevention of   group B streptococcal (GBS) disease specify co-collection of  a vaginal and rectal swab specimen to maximize sensitivity of GBS  detection. Per the CDC and ACOG, swabbing both the lower vagina and  rectum substantially  increases the yield of detection compared with  sampling the vagina alone.  Penicillin G, ampicillin, or cefazolin are indicated for intrapartum  prophylaxis of  GBS colonization. Reflex susceptibility  testing should be performed prior to use of clindamycin only on GBS  isolates from penicillin-allergic women who are considered a high risk  for anaphylaxis. Treatment with vancomycin without additional testing  is warranted if resistance to clindamycin is noted.         Plan:  1. Proceed with scheduled . Will re-start lovenox postpartum and continue through 6 to 8 weeks following delivery.   2. Plan of care has been reviewed with patient and spouse  3.  Risks, benefits of treatment plan have been discussed.  4.  All questions have been answered.        Rea Stone MD  2021  07:17 EDT

## 2021-04-19 NOTE — OP NOTE
. SECTION FULL OPERATIVE REPORT  Pre-operative Diagnosis: 36 week Intrauterine Pregnancy, Previous , Cholestasis of pregnancy, Anti-Thrombin III Deficiency  Post-operative Diagnosis: same  Procedure: Repeat Low Transverse  Section  Anesthesia: spinal  Surgeon(s): Rea Stone MD  Assistant(s): Julio César Leal MD  Infant: LV female infant, Apgars 8/8, weight 6lbs, 14.9oz  Findings: Uterus with several small fibroids, normal fallopian tubes and ovaries  Complications: none  Specimens: none  EBL: 425 mL      Description of Procedure:  The patient was taken to the operating room where anesthesia was found to be adequate. She was prepped and draped in the usual sterile fashion in the dorsal supine position with a leftward tilt. A surgical-time-out was performed.   A Pfannenstiel skin incision was made with the scalpel and carried down to the underlying layer of fascia. The fascia was then incised in the midline and the incision was extended laterally with avery scissors. The superior aspect of the fascia was then grasped with Kocher clamps and the underlying rectus muscles were then dissected off bluntly with the Avery scissors. The inferior aspect of the fascia was then grasped with Kocher clamps and dissected off similarly with Avery scissors. The rectus muscles were  and the peritoneum entered. The peritoneal incision was then carried superiorly and inferiorly taking care to identify the bladder at the lower aspect.   The bladder blade was inserted. The vesicouterine peritoneum was then identified and entered sharply with Metzenbaum scissors and a bladder flap was created. The bladder blade was reinserted and the uterine incision was made in a low transverse fashion using the scalpel. This was extended digitally.  The bladder blade was removed and the infant was delivered atraumatically. The nose and mouth were suctioned and the cord was clamped and cut after 30 seconds. The infant was taken  to the warmer for the waiting staff. Cord blood was collected. The placenta was removed and the uterus was exteriorized and cleared of all clots and debris. The uterine incision was repaired in two layers using 0 vicryl suture. The uterus was examined and noted to be hemostatic and returned to the abdomen.  The posterior cul-de-sac and gutters were cleared of all clots and debris. The uterus was then re-inspected to ensure hemostasis as were all subfascial tissues. The peritoneum was closed in a running fashion with 3-0 vicryl. The fascia was re-approximated in a running fashion using 0-vicryl suture. The subcutaneous tissue was re-approximated in a running fashion with 3-0 vicryl. The skin was closed with 4-0 monocryl.    The patient tolerated the procedure well. Sponge, lap and needle counts were correct. The patient was taken to recovery in stable condition. The patient received antibiotic prophylaxis with gentamicin and clindamycin.     The surgical assistant, Dr. Leal, was responsible for the following tasks:  Suctioning, retraction, assistance with delivery of the infant. Her skilled assistance was necessary for the success of this case.    Rea Stone MD  April 19, 2021

## 2021-04-19 NOTE — LACTATION NOTE
This note was copied from a baby's chart.  Mom reports baby is nursing well so far, last BGM was 68. She prefers to hand express vs using HGP and gave 2.9mls EBM. Encouraged to call for any assistance or concerns. Discussed feeding cues, wet/stool diapers and  behaviors.

## 2021-04-19 NOTE — ANESTHESIA PREPROCEDURE EVALUATION
Anesthesia Evaluation     Patient summary reviewed and Nursing notes reviewed   NPO Solid Status: > 8 hours  NPO Liquid Status: > 2 hours           Airway   Mallampati: II  TM distance: >3 FB  Neck ROM: full  Dental - normal exam     Pulmonary - negative pulmonary ROS and normal exam    breath sounds clear to auscultation  Cardiovascular - normal exam    Rhythm: regular  Rate: normal    (-) angina, orthopnea, PND, GARCIA      Neuro/Psych- negative ROS  GI/Hepatic/Renal/Endo    (+)   liver disease (Cholestasis during pregnancy in third trimester),     Musculoskeletal (-) negative ROS    Abdominal    Substance History - negative use     OB/GYN    (+) Pregnant,         Other   blood dyscrasia (Antithrombin 3 deficiency ),                     Anesthesia Plan    ASA 3     spinal       Anesthetic plan, all risks, benefits, and alternatives have been provided, discussed and informed consent has been obtained with: patient.

## 2021-04-20 LAB
BASOPHILS # BLD AUTO: 0.09 10*3/MM3 (ref 0–0.2)
BASOPHILS NFR BLD AUTO: 0.6 % (ref 0–1.5)
DEPRECATED RDW RBC AUTO: 42.9 FL (ref 37–54)
EOSINOPHIL # BLD AUTO: 0.14 10*3/MM3 (ref 0–0.4)
EOSINOPHIL NFR BLD AUTO: 0.9 % (ref 0.3–6.2)
ERYTHROCYTE [DISTWIDTH] IN BLOOD BY AUTOMATED COUNT: 13.2 % (ref 12.3–15.4)
HCT VFR BLD AUTO: 36.2 % (ref 34–46.6)
HGB BLD-MCNC: 12.5 G/DL (ref 12–15.9)
IMM GRANULOCYTES # BLD AUTO: 0.25 10*3/MM3 (ref 0–0.05)
IMM GRANULOCYTES NFR BLD AUTO: 1.6 % (ref 0–0.5)
LYMPHOCYTES # BLD AUTO: 3.86 10*3/MM3 (ref 0.7–3.1)
LYMPHOCYTES NFR BLD AUTO: 25.5 % (ref 19.6–45.3)
MCH RBC QN AUTO: 31.1 PG (ref 26.6–33)
MCHC RBC AUTO-ENTMCNC: 34.5 G/DL (ref 31.5–35.7)
MCV RBC AUTO: 90 FL (ref 79–97)
MONOCYTES # BLD AUTO: 1.42 10*3/MM3 (ref 0.1–0.9)
MONOCYTES NFR BLD AUTO: 9.4 % (ref 5–12)
NEUTROPHILS NFR BLD AUTO: 62 % (ref 42.7–76)
NEUTROPHILS NFR BLD AUTO: 9.4 10*3/MM3 (ref 1.7–7)
NRBC BLD AUTO-RTO: 0 /100 WBC (ref 0–0.2)
PLATELET # BLD AUTO: 220 10*3/MM3 (ref 140–450)
PMV BLD AUTO: 10.8 FL (ref 6–12)
RBC # BLD AUTO: 4.02 10*6/MM3 (ref 3.77–5.28)
WBC # BLD AUTO: 15.16 10*3/MM3 (ref 3.4–10.8)

## 2021-04-20 PROCEDURE — 85025 COMPLETE CBC W/AUTO DIFF WBC: CPT | Performed by: OBSTETRICS & GYNECOLOGY

## 2021-04-20 PROCEDURE — 25010000002 ENOXAPARIN PER 10 MG: Performed by: OBSTETRICS & GYNECOLOGY

## 2021-04-20 PROCEDURE — 0503F POSTPARTUM CARE VISIT: CPT | Performed by: OBSTETRICS & GYNECOLOGY

## 2021-04-20 RX ORDER — OXYCODONE AND ACETAMINOPHEN 7.5; 325 MG/1; MG/1
1 TABLET ORAL EVERY 4 HOURS PRN
Status: DISCONTINUED | OUTPATIENT
Start: 2021-04-20 | End: 2021-04-22 | Stop reason: HOSPADM

## 2021-04-20 RX ADMIN — SIMETHICONE 80 MG: 80 TABLET, CHEWABLE ORAL at 06:42

## 2021-04-20 RX ADMIN — SIMETHICONE 80 MG: 80 TABLET, CHEWABLE ORAL at 16:30

## 2021-04-20 RX ADMIN — DOCUSATE SODIUM 100 MG: 100 CAPSULE, LIQUID FILLED ORAL at 08:17

## 2021-04-20 RX ADMIN — IBUPROFEN 600 MG: 600 TABLET ORAL at 06:42

## 2021-04-20 RX ADMIN — OXYCODONE HYDROCHLORIDE AND ACETAMINOPHEN 1 TABLET: 7.5; 325 TABLET ORAL at 11:08

## 2021-04-20 RX ADMIN — IBUPROFEN 600 MG: 600 TABLET ORAL at 23:02

## 2021-04-20 RX ADMIN — OXYCODONE HYDROCHLORIDE AND ACETAMINOPHEN 1 TABLET: 7.5; 325 TABLET ORAL at 23:03

## 2021-04-20 RX ADMIN — IBUPROFEN 600 MG: 600 TABLET ORAL at 15:00

## 2021-04-20 RX ADMIN — OXYCODONE HYDROCHLORIDE AND ACETAMINOPHEN 1 TABLET: 5; 325 TABLET ORAL at 06:42

## 2021-04-20 RX ADMIN — Medication 1 TABLET: at 08:17

## 2021-04-20 RX ADMIN — SIMETHICONE 80 MG: 80 TABLET, CHEWABLE ORAL at 23:02

## 2021-04-20 RX ADMIN — DOCUSATE SODIUM 100 MG: 100 CAPSULE, LIQUID FILLED ORAL at 20:53

## 2021-04-20 RX ADMIN — OXYCODONE HYDROCHLORIDE AND ACETAMINOPHEN 1 TABLET: 7.5; 325 TABLET ORAL at 15:00

## 2021-04-20 RX ADMIN — ENOXAPARIN SODIUM 40 MG: 40 INJECTION SUBCUTANEOUS at 08:17

## 2021-04-20 RX ADMIN — OXYCODONE HYDROCHLORIDE AND ACETAMINOPHEN 1 TABLET: 5; 325 TABLET ORAL at 02:26

## 2021-04-20 NOTE — PROGRESS NOTES
" POSTPARTUM ROUNDS    CC: POD 1 from CS    SUBJECTIVE:  Pain is controlled. The patient is tolerating a regular diet.  No nausea or vomiting. She is ambulating. Her lochia is normal.     ROS:  No leg pain, no HA, no N/V, no F/C, lochia normal    OBJECTIVE:  Vital Signs: /64 (BP Location: Right arm, Patient Position: Lying)   Pulse 71   Temp 97.9 °F (36.6 °C) (Oral)   Resp 18   Ht 170.2 cm (67\")   Wt 84.8 kg (187 lb)   LMP 2020   SpO2 100%   Breastfeeding Yes   BMI 29.29 kg/m²     Intake/Output Summary (Last 24 hours) at 2021 1108  Last data filed at 2021 1043  Gross per 24 hour   Intake 1480 ml   Output 4425 ml   Net -2945 ml       Constitutional: The patient is well nourished., alert and oriented and no distress  Cardiovascular:RRR  Resp: nonlabored breathing  The incision is CDI  Abdomen: fundus firm below umbilicus, soft , ND, fundus non-tender  Extremities: trace  edema, non-tender bilateral    LABS / IMAGING:  Lab Results (last 24 hours)     Procedure Component Value Units Date/Time    CBC & Differential [[id number]]  (Abnormal) Collected: 21    Specimen: Blood Updated: 21    Narrative:      The following orders were created for panel order CBC & Differential.  Procedure                               Abnormality         Status                     ---------                               -----------         ------                     CBC Auto Differential[[id number]]        Abnormal            Final result                 Please view results for these tests on the individual orders.    CBC Auto Differential [[id number]]  (Abnormal) Collected: 21    Specimen: Blood Updated: 21     WBC 15.16 10*3/mm3      RBC 4.02 10*6/mm3      Hemoglobin 12.5 g/dL      Hematocrit 36.2 %      MCV 90.0 fL      MCH 31.1 pg      MCHC 34.5 g/dL      RDW 13.2 %      RDW-SD 42.9 fl      MPV 10.8 fL      Platelets 220 10*3/mm3      Neutrophil % 62.0 %      " Lymphocyte % 25.5 %      Monocyte % 9.4 %      Eosinophil % 0.9 %      Basophil % 0.6 %      Immature Grans % 1.6 %      Neutrophils, Absolute 9.40 10*3/mm3      Lymphocytes, Absolute 3.86 10*3/mm3      Monocytes, Absolute 1.42 10*3/mm3      Eosinophils, Absolute 0.14 10*3/mm3      Basophils, Absolute 0.09 10*3/mm3      Immature Grans, Absolute 0.25 10*3/mm3      nRBC 0.0 /100 WBC           ASSESSMENT AND PLAN:    Patient Active Problem List   Diagnosis   • Rh negative state in antepartum period   • Fibroid   • Cholestasis during pregnancy in third trimester   • Antithrombin 3 deficiency (CMS/HCC)   • Hx of preeclampsia, prior pregnancy, currently pregnant   • Pregnancy resulting from assisted reproductive technology, antepartum   • Family history of congenital anomaly   • Cholestasis of pregnancy in third trimester   • Pregnancy   • Cholestasis during pregnancy   •  delivery delivered       Patient is postop day 1 from LTCS  Patient is doing well    Plan:  Regular diet   Encourage ambulation     Margy Franks MD    2021  11:08 EDT

## 2021-04-20 NOTE — PLAN OF CARE
Goal Outcome Evaluation:      Good pain control with oral pain med. Amb in room and babb. Assisted with breastfeeding.

## 2021-04-20 NOTE — LACTATION NOTE
Lactation Consult Note  Mom latches baby well but baby is sleepy and has shallow latch. Baby has had good output, normal BGM's and is now over 24 hrs old. Mom has been hand expressing and giving EBM also. Encouraged pumping now and giving all EBM. Discussed how baby will need more volume of milk today, stressed pumping every 2-3 hrs, call for any assistance or questions.    Evaluation Completed: 2021 11:05 EDT  Patient Name: Carey Armando  :  1985  MRN:  [id number]     REFERRAL  INFORMATION:                          Date of Referral: 21   Person Making Referral: nurse  Maternal Reason for Referral: breastfeeding currently  Infant Reason for Referral: 35-37 weeks gestation    DELIVERY HISTORY:        Skin to skin initiation date/time: 2021  8:26 AM   Skin to skin end date/time: 2021  8:55 AM        MATERNAL ASSESSMENT:  Breast Size Issue: none (21 1100)  Breast Shape: round (21 1100)  Breast Density: soft (21 1100)  Areola: elastic (21 1100)  Nipples: bulbous (21 1100)                INFANT ASSESSMENT:  Information for the patient's :  Krunal Armando [[id number]]   No past medical history on file.     Feeding Readiness Cues: cooing (21 110)      Feeding Tolerance/Success: arousal required, suck weak (21 110)                  Nutrition Interventions: lactation consult initiated (21 110)            Breastfeeding: breastfeeding, bilateral (21 110)   Infant Positioning: cross-cradle (21 110)   Breastfeeding Time, Left (min): 5 (21 110)      Effective Latch During Feeding: no (21 110)   Suck/Swallow Coordination: absent (21 110)   Signs of Milk Transfer: none noted (21 110)       Latch: 1-->repeated attempts, holds nipple in mouth, stimulate to suck (21 110)   Audible Swallowin-->a few with stimulation (21 110)   Type of Nipple: 2-->everted (after stimulation) (21  1102)   Comfort (Breast/Nipple): 2-->soft/nontender (04/20/21 1102)   Hold (Positioning): 2-->no assist from staff, mother able to position/hold infant (04/20/21 1102)   Latch Score: 8 (04/20/21 1102)     Infant-Driven Feeding Scales - Readiness: Alert once handled. Some rooting or takes pacifier. Adequate tone. (04/20/21 1102)               MATERNAL INFANT FEEDING:     Maternal Emotional State: relaxed (04/20/21 1100)  Infant Positioning: cross-cradle (04/20/21 1100)   Signs of Milk Transfer: none noted (04/20/21 1100)              Milk Ejection Reflex: present (04/20/21 1100)           Latch Assistance: none needed (04/20/21 1100)                               EQUIPMENT TYPE:  Breast Pump Type: double electric, hospital grade (04/20/21 1100)                              BREAST PUMPING:          LACTATION REFERRALS:

## 2021-04-21 PROCEDURE — 0503F POSTPARTUM CARE VISIT: CPT | Performed by: OBSTETRICS & GYNECOLOGY

## 2021-04-21 PROCEDURE — 25010000002 ENOXAPARIN PER 10 MG: Performed by: OBSTETRICS & GYNECOLOGY

## 2021-04-21 RX ADMIN — IBUPROFEN 600 MG: 600 TABLET ORAL at 15:02

## 2021-04-21 RX ADMIN — SIMETHICONE 80 MG: 80 TABLET, CHEWABLE ORAL at 15:02

## 2021-04-21 RX ADMIN — SIMETHICONE 80 MG: 80 TABLET, CHEWABLE ORAL at 08:17

## 2021-04-21 RX ADMIN — ENOXAPARIN SODIUM 40 MG: 40 INJECTION SUBCUTANEOUS at 08:17

## 2021-04-21 RX ADMIN — Medication 1 TABLET: at 11:41

## 2021-04-21 RX ADMIN — Medication 1 APPLICATION: at 02:00

## 2021-04-21 RX ADMIN — IBUPROFEN 600 MG: 600 TABLET ORAL at 07:02

## 2021-04-21 RX ADMIN — OXYCODONE HYDROCHLORIDE AND ACETAMINOPHEN 1 TABLET: 7.5; 325 TABLET ORAL at 07:02

## 2021-04-21 RX ADMIN — OXYCODONE HYDROCHLORIDE AND ACETAMINOPHEN 1 TABLET: 5; 325 TABLET ORAL at 22:02

## 2021-04-21 RX ADMIN — DOCUSATE SODIUM 100 MG: 100 CAPSULE, LIQUID FILLED ORAL at 08:17

## 2021-04-21 RX ADMIN — OXYCODONE HYDROCHLORIDE AND ACETAMINOPHEN 1 TABLET: 5; 325 TABLET ORAL at 11:41

## 2021-04-21 RX ADMIN — DOCUSATE SODIUM 100 MG: 100 CAPSULE, LIQUID FILLED ORAL at 21:48

## 2021-04-21 NOTE — LACTATION NOTE
Patient feels Baby Ginger is nursing well as far as a good latch and consistent suckle. At 36 weeks baby is sleepy and needs frequent arousal. Her feeds are frequent and she is having multiple stools. Mom's first baby was in NICU at 33 weeks. She has a personal pump. Knows to call LC as needed.  Lactation Consult Note    Evaluation Completed: 2021 09:48 EDT  Patient Name: Carey Armando  :  1985  MRN:  [id number]     REFERRAL  INFORMATION:                          Date of Referral: 21   Person Making Referral: lactation consultant  Maternal Reason for Referral: breastfeeding currently  Infant Reason for Referral: 35-37 weeks gestation    DELIVERY HISTORY:        Skin to skin initiation date/time: 2021  8:26 AM   Skin to skin end date/time: 2021  8:55 AM        MATERNAL ASSESSMENT:  Breast Size Issue: none (21)  Breast Shape: round (21)        Nipples: everted (21)     Left Nipple Symptoms: tender (21)  Right Nipple Symptoms: tender (21)       INFANT ASSESSMENT:  Information for the patient's :  Krunal Armando [[id number]]   No past medical history on file.                                                                                                     MATERNAL INFANT FEEDING:     Maternal Emotional State: independent, receptive (21)                                                                 EQUIPMENT TYPE:  Breast Pump Type: double electric, hospital grade (21)  Breast Pump Flange Type: hard (21)  Breast Pump Flange Size: 24 mm (21)                        BREAST PUMPING:          LACTATION REFERRALS:

## 2021-04-21 NOTE — PROGRESS NOTES
[facility]   PROGRESS NOTE    Post-Op Day 2 S/P   Subjective     Patient reports:  Pain is well controlled.  She is ambulating. Tolerating diet. Tolerating po -- normal.  Intake -- c/o of tolerating po solids.   Voiding - without difficulty; flatus reported..  Vaginal bleeding is light.    ROS:  Pulm: negative for shortness of air  : negative for heavy bleeding  Musculoskeletal: negative for leg pain    Objective      Vitals: Vital Signs Range for the last 24 hours  Temperature: Temp:  [97.2 °F (36.2 °C)-97.9 °F (36.6 °C)] 97.5 °F (36.4 °C)   Temp Source: Temp src: Oral   BP: BP: (106-117)/(71-76) 117/76   Pulse: Heart Rate:  [71-79] 71   Respirations: Resp:  [16-18] 16   SPO2:     O2 Amount (l/min):     O2 Devices Device (Oxygen Therapy): room air   Weight:              Physical Exam    Lungs normal respiratory effort   Abdomen Soft, fundus firm at U-1   Incision  no drainage, no erythema, no swelling, well approximated   Extremities bilateral lower extremities are without cords, tenderness or edema     labs:  Lab Results   Component Value Date    WBC 15.16 (H) 2021    HGB 12.5 2021    HCT 36.2 2021    MCV 90.0 2021     2021     Results from last 7 days   Lab Units 21  0528   ABO TYPING  O   RH TYPING  Negative     Assessment/Plan        Rh negative state in antepartum period    Fibroid    Antithrombin 3 deficiency (CMS/[facility])    Hx of preeclampsia, prior pregnancy, currently pregnant    Pregnancy resulting from assisted reproductive technology, antepartum    Cholestasis during pregnancy     delivery delivered      Assessment:    Carey Armando is Day 2  post-partum  , Low Transverse : pt is doing well and denies any issues    Anti-thrombin III def: pt is on lovenox for prophylaxis, will continue through 8 weeks postpartum    Rh neg: baby is Rh negative      Plan:  continue post op care.        Rea Stone MD  2021  14:01  EDT

## 2021-04-21 NOTE — LACTATION NOTE
This note was copied from a baby's chart.  Informed PT that LC is here to help with BF tonight. Offered assistance but mother declined, said she will call later, when baby is due to BF if she needs help. Reports infant is latching well.PT declines any questions and concerns at this time. Encouraged to call LC if needing further assistance.

## 2021-04-21 NOTE — PLAN OF CARE
Goal Outcome Evaluation:         VS stable and bleeding WNL. Voiding without difficulty and ambulating in room. Pain controlled with Motrin and Percocet 7.5. Breastfeeding without assistance and bonding with .

## 2021-04-22 VITALS
DIASTOLIC BLOOD PRESSURE: 76 MMHG | HEART RATE: 67 BPM | WEIGHT: 187 LBS | HEIGHT: 67 IN | SYSTOLIC BLOOD PRESSURE: 106 MMHG | OXYGEN SATURATION: 100 % | TEMPERATURE: 96.7 F | RESPIRATION RATE: 18 BRPM | BODY MASS INDEX: 29.35 KG/M2

## 2021-04-22 PROCEDURE — 25010000002 ENOXAPARIN PER 10 MG: Performed by: OBSTETRICS & GYNECOLOGY

## 2021-04-22 PROCEDURE — 0503F POSTPARTUM CARE VISIT: CPT | Performed by: OBSTETRICS & GYNECOLOGY

## 2021-04-22 RX ORDER — OXYCODONE HYDROCHLORIDE AND ACETAMINOPHEN 5; 325 MG/1; MG/1
1 TABLET ORAL EVERY 4 HOURS PRN
Qty: 12 TABLET | Refills: 0 | Status: SHIPPED | OUTPATIENT
Start: 2021-04-22 | End: 2021-04-26

## 2021-04-22 RX ORDER — IBUPROFEN 600 MG/1
600 TABLET ORAL EVERY 8 HOURS PRN
Qty: 18 TABLET | Refills: 0 | Status: SHIPPED | OUTPATIENT
Start: 2021-04-22 | End: 2021-04-27

## 2021-04-22 RX ADMIN — MUPIROCIN 1 APPLICATION: 20 OINTMENT TOPICAL at 00:42

## 2021-04-22 RX ADMIN — ENOXAPARIN SODIUM 40 MG: 40 INJECTION SUBCUTANEOUS at 08:31

## 2021-04-22 RX ADMIN — IBUPROFEN 600 MG: 600 TABLET ORAL at 08:32

## 2021-04-22 RX ADMIN — IBUPROFEN 600 MG: 600 TABLET ORAL at 00:39

## 2021-04-22 RX ADMIN — DOCUSATE SODIUM 100 MG: 100 CAPSULE, LIQUID FILLED ORAL at 08:31

## 2021-04-22 RX ADMIN — OXYCODONE HYDROCHLORIDE AND ACETAMINOPHEN 1 TABLET: 5; 325 TABLET ORAL at 11:20

## 2021-04-22 RX ADMIN — Medication 1 TABLET: at 08:31

## 2021-04-22 NOTE — PLAN OF CARE
Goal Outcome Evaluation:         Vs stable. Incision clean. Pain controlled with Motrin and Percocet. Breast feeding with minimal assistance and supplementing. Bonding with .

## 2021-04-22 NOTE — LACTATION NOTE
Baby with 12% weight loss. Using SNS. Supplies replenished. Encouraged them to put 30cc in SNS. Patient is sleeping so spoke with FOB.

## 2021-04-22 NOTE — LACTATION NOTE
This note was copied from a baby's chart.  Per MD order baby has to be supplemented with formula. Mother wants to use SNS and asked for help. Instructions of use and cleaning given and PT assisted with BF and using the SNS during it. Baby is latching well, has a good jaw rotation and was able to take 22ml of formula. Encouraged to call LC if needing further assistance.

## 2021-04-22 NOTE — PLAN OF CARE
Goal Outcome Evaluation:  Progress: improving  Outcome Summary: Discharge home today. Pain well controlled with PRN Motrin and Percocet. Breastfeeding and supplementing with S&S. Ambulating indepedently and voiding spontaneously. VSS.

## 2021-04-22 NOTE — DISCHARGE SUMMARY
OB Discharge Summary C/S    This  female, was admitted on 2021 and underwent a , Low Transverse  on 2021 , resulting in the birth of the following:  Infant         Weight:   Birth Information  YOB: 2019   Time of birth: 8:03 AM   Delivering clinician: Rea Stone   Sex: female   Delivery type: , Low Transverse   Breech type (if applicable):     Observed anomalies/comments: derik 1         Observations/Comments:  derik Rowan      Apgars: 8  @ 1 minute /    8  @ 5 minutes     LABS:   Lab Results (last 72 hours)     Procedure Component Value Units Date/Time    CBC & Differential [[id number]]  (Abnormal) Collected: 21    Specimen: Blood Updated: 21    Narrative:      The following orders were created for panel order CBC & Differential.  Procedure                               Abnormality         Status                     ---------                               -----------         ------                     CBC Auto Differential[[id number]]        Abnormal            Final result                 Please view results for these tests on the individual orders.    CBC Auto Differential [[id number]]  (Abnormal) Collected: 21    Specimen: Blood Updated: 21     WBC 15.16 10*3/mm3      RBC 4.02 10*6/mm3      Hemoglobin 12.5 g/dL      Hematocrit 36.2 %      MCV 90.0 fL      MCH 31.1 pg      MCHC 34.5 g/dL      RDW 13.2 %      RDW-SD 42.9 fl      MPV 10.8 fL      Platelets 220 10*3/mm3      Neutrophil % 62.0 %      Lymphocyte % 25.5 %      Monocyte % 9.4 %      Eosinophil % 0.9 %      Basophil % 0.6 %      Immature Grans % 1.6 %      Neutrophils, Absolute 9.40 10*3/mm3      Lymphocytes, Absolute 3.86 10*3/mm3      Monocytes, Absolute 1.42 10*3/mm3      Eosinophils, Absolute 0.14 10*3/mm3      Basophils, Absolute 0.09 10*3/mm3      Immature Grans, Absolute 0.25 10*3/mm3      nRBC 0.0 /100 WBC           ROS:  Pulm: neg for soa  GI: neg for  heavy bleeding              Musculoskel: neg for leg pain        Discharge diagnosis:     Medical Problems     Hospital Problem List     Rh negative state in antepartum period    Fibroid    Overview Addendum 2019  8:23 PM by Carissa Luna MD     Posterior fibroid 7 x 4.5 x 5.8 cm seen at [facility] at 18 weeks  Anterior fibroid 4.7 x 2.3 x 2.8 cm with placenta underlying per [facility] at 32 weeks         Antithrombin 3 deficiency (CMS/HCC)    Hx of preeclampsia, prior pregnancy, currently pregnant    Pregnancy resulting from assisted reproductive technology, antepartum    Cholestasis during pregnancy     delivery delivered                                                              Cholestasis during pregnancy [O26.619, K83.1]  Cholestasis during pregnancy [O26.619, K83.1]                                                   section at 36w2d, uncomplicated recovery      Post operatively the patient did well. She was tolerating a regular diet, ambulating, voiding and passing flatus. Post op hemoglobin was   Lab Results   Component Value Date    HGB 12.5 2021   .     On day of discharge, uterus was firm, incision was clean, dry and intact, extremities were non tender with no erythema or masses.  There was a small rash on the left side of her abdomen where bandage had been applied.    Pt was given prescriptions for Percocet 5/325 and Motrin 800 mg PRN for pain.  1% hydrocortisone cream to the rash on her belly.  Patient will continue to take her Lovenox for 8 weeks as previously instructed.    Instructed to call the office to make an appointment in 2  and 6  Weeks and to    please call the office, or the OB/GYN on-call if after-hours, with any questions,concerns or  any of the followin) Fever - a temperature greater than 100.4  2) Increased pain  3) Heavy vaginal bleeding (soaking more than 1 pad in an hour)  4) Foul-smelling discharge from the vagina  5) Red, painful incision or foul-smelling  discharge from incision.    She was instructed to  not place anything in the vagina -  Including  tampons, douches or having sex - until after her  6 week postpartum visit to prevent infection.    Wash your incision everyday with warm water and gentle soap. You may pat it dry.

## 2021-04-27 ENCOUNTER — APPOINTMENT (OUTPATIENT)
Dept: ULTRASOUND IMAGING | Facility: HOSPITAL | Age: 36
End: 2021-04-27

## 2021-04-27 ENCOUNTER — HOSPITAL ENCOUNTER (INPATIENT)
Facility: HOSPITAL | Age: 36
LOS: 3 days | Discharge: HOME | End: 2021-05-01
Attending: EMERGENCY MEDICINE | Admitting: OBSTETRICS & GYNECOLOGY

## 2021-04-27 ENCOUNTER — OFFICE VISIT (OUTPATIENT)
Dept: OBSTETRICS AND GYNECOLOGY | Age: 36
End: 2021-04-27

## 2021-04-27 VITALS
SYSTOLIC BLOOD PRESSURE: 110 MMHG | WEIGHT: 181.6 LBS | BODY MASS INDEX: 28.5 KG/M2 | HEIGHT: 67 IN | DIASTOLIC BLOOD PRESSURE: 60 MMHG

## 2021-04-27 DIAGNOSIS — D68.59 ANTITHROMBIN 3 DEFICIENCY (HCC): ICD-10-CM

## 2021-04-27 DIAGNOSIS — K83.1 CHOLESTASIS DURING PREGNANCY, ANTEPARTUM: ICD-10-CM

## 2021-04-27 DIAGNOSIS — O26.619 CHOLESTASIS DURING PREGNANCY, ANTEPARTUM: ICD-10-CM

## 2021-04-27 DIAGNOSIS — Z09 POSTOPERATIVE EXAMINATION: Primary | ICD-10-CM

## 2021-04-27 PROBLEM — O09.819 PREGNANCY RESULTING FROM ASSISTED REPRODUCTIVE TECHNOLOGY, ANTEPARTUM: Status: RESOLVED | Noted: 2020-10-23 | Resolved: 2021-04-27

## 2021-04-27 PROBLEM — O26.643 CHOLESTASIS OF PREGNANCY IN THIRD TRIMESTER: Status: RESOLVED | Noted: 2021-04-03 | Resolved: 2021-04-27

## 2021-04-27 PROBLEM — O26.613 CHOLESTASIS OF PREGNANCY IN THIRD TRIMESTER: Status: RESOLVED | Noted: 2021-04-03 | Resolved: 2021-04-27

## 2021-04-27 PROBLEM — O26.643 CHOLESTASIS DURING PREGNANCY IN THIRD TRIMESTER: Status: RESOLVED | Noted: 2019-02-16 | Resolved: 2021-04-27

## 2021-04-27 PROBLEM — O09.299 HX OF PREECLAMPSIA, PRIOR PREGNANCY, CURRENTLY PREGNANT: Status: RESOLVED | Noted: 2020-10-23 | Resolved: 2021-04-27

## 2021-04-27 PROBLEM — O26.613 CHOLESTASIS DURING PREGNANCY IN THIRD TRIMESTER: Status: RESOLVED | Noted: 2019-02-16 | Resolved: 2021-04-27

## 2021-04-27 LAB
ABO GROUP BLD: NORMAL
ALBUMIN SERPL-MCNC: 4.1 G/DL (ref 3.5–5.2)
ALBUMIN/GLOB SERPL: 1.3 G/DL
ALP SERPL-CCNC: 103 U/L (ref 39–117)
ALT SERPL W P-5'-P-CCNC: 18 U/L (ref 1–33)
ANION GAP SERPL CALCULATED.3IONS-SCNC: 10.8 MMOL/L (ref 5–15)
AST SERPL-CCNC: 15 U/L (ref 1–32)
BASOPHILS # BLD AUTO: 0.07 10*3/MM3 (ref 0–0.2)
BASOPHILS NFR BLD AUTO: 0.6 % (ref 0–1.5)
BILIRUB SERPL-MCNC: 0.2 MG/DL (ref 0–1.2)
BLD GP AB SCN SERPL QL: POSITIVE
BUN SERPL-MCNC: 12 MG/DL (ref 6–20)
BUN/CREAT SERPL: 17.9 (ref 7–25)
CALCIUM SPEC-SCNC: 9.8 MG/DL (ref 8.6–10.5)
CHLORIDE SERPL-SCNC: 103 MMOL/L (ref 98–107)
CO2 SERPL-SCNC: 25.2 MMOL/L (ref 22–29)
CREAT SERPL-MCNC: 0.67 MG/DL (ref 0.57–1)
DEPRECATED RDW RBC AUTO: 41.1 FL (ref 37–54)
EOSINOPHIL # BLD AUTO: 0.27 10*3/MM3 (ref 0–0.4)
EOSINOPHIL NFR BLD AUTO: 2.4 % (ref 0.3–6.2)
ERYTHROCYTE [DISTWIDTH] IN BLOOD BY AUTOMATED COUNT: 13 % (ref 12.3–15.4)
GFR SERPL CREATININE-BSD FRML MDRD: 101 ML/MIN/1.73
GLOBULIN UR ELPH-MCNC: 3.2 GM/DL
GLUCOSE SERPL-MCNC: 104 MG/DL (ref 65–99)
HCG INTACT+B SERPL-ACNC: 1032 MIU/ML
HCT VFR BLD AUTO: 37.3 % (ref 34–46.6)
HGB BLD-MCNC: 13 G/DL (ref 12–15.9)
LIPASE SERPL-CCNC: 42 U/L (ref 13–60)
LYMPHOCYTES # BLD AUTO: 3.92 10*3/MM3 (ref 0.7–3.1)
LYMPHOCYTES NFR BLD AUTO: 34.7 % (ref 19.6–45.3)
MCH RBC QN AUTO: 30.7 PG (ref 26.6–33)
MCHC RBC AUTO-ENTMCNC: 34.9 G/DL (ref 31.5–35.7)
MCV RBC AUTO: 88 FL (ref 79–97)
MONOCYTES # BLD AUTO: 0.77 10*3/MM3 (ref 0.1–0.9)
MONOCYTES NFR BLD AUTO: 6.8 % (ref 5–12)
NEUTROPHILS NFR BLD AUTO: 54.7 % (ref 42.7–76)
NEUTROPHILS NFR BLD AUTO: 6.19 10*3/MM3 (ref 1.7–7)
PLATELET # BLD AUTO: 360 10*3/MM3 (ref 140–450)
PMV BLD AUTO: 9.5 FL (ref 6–12)
POTASSIUM SERPL-SCNC: 3.7 MMOL/L (ref 3.5–5.2)
PROT SERPL-MCNC: 7.3 G/DL (ref 6–8.5)
RBC # BLD AUTO: 4.24 10*6/MM3 (ref 3.77–5.28)
RH BLD: NEGATIVE
SODIUM SERPL-SCNC: 139 MMOL/L (ref 136–145)
T&S EXPIRATION DATE: NORMAL
WBC # BLD AUTO: 11.31 10*3/MM3 (ref 3.4–10.8)

## 2021-04-27 PROCEDURE — 80053 COMPREHEN METABOLIC PANEL: CPT | Performed by: EMERGENCY MEDICINE

## 2021-04-27 PROCEDURE — 86901 BLOOD TYPING SEROLOGIC RH(D): CPT

## 2021-04-27 PROCEDURE — 86920 COMPATIBILITY TEST SPIN: CPT

## 2021-04-27 PROCEDURE — 85025 COMPLETE CBC W/AUTO DIFF WBC: CPT | Performed by: EMERGENCY MEDICINE

## 2021-04-27 PROCEDURE — 84702 CHORIONIC GONADOTROPIN TEST: CPT | Performed by: EMERGENCY MEDICINE

## 2021-04-27 PROCEDURE — 76856 US EXAM PELVIC COMPLETE: CPT

## 2021-04-27 PROCEDURE — 99285 EMERGENCY DEPT VISIT HI MDM: CPT

## 2021-04-27 PROCEDURE — 86870 RBC ANTIBODY IDENTIFICATION: CPT | Performed by: EMERGENCY MEDICINE

## 2021-04-27 PROCEDURE — 86901 BLOOD TYPING SEROLOGIC RH(D): CPT | Performed by: EMERGENCY MEDICINE

## 2021-04-27 PROCEDURE — 0503F POSTPARTUM CARE VISIT: CPT | Performed by: OBSTETRICS & GYNECOLOGY

## 2021-04-27 PROCEDURE — 86900 BLOOD TYPING SEROLOGIC ABO: CPT | Performed by: EMERGENCY MEDICINE

## 2021-04-27 PROCEDURE — 83690 ASSAY OF LIPASE: CPT | Performed by: EMERGENCY MEDICINE

## 2021-04-27 PROCEDURE — 86900 BLOOD TYPING SEROLOGIC ABO: CPT

## 2021-04-27 PROCEDURE — 86850 RBC ANTIBODY SCREEN: CPT | Performed by: EMERGENCY MEDICINE

## 2021-04-27 RX ORDER — SODIUM CHLORIDE 0.9 % (FLUSH) 0.9 %
10 SYRINGE (ML) INJECTION AS NEEDED
Status: DISCONTINUED | OUTPATIENT
Start: 2021-04-27 | End: 2021-05-01 | Stop reason: HOSPADM

## 2021-04-27 RX ORDER — DOCUSATE SODIUM 100 MG/1
100 CAPSULE, LIQUID FILLED ORAL 2 TIMES DAILY
COMMUNITY
End: 2021-10-01

## 2021-04-27 RX ORDER — PRENATAL VIT/IRON FUM/FOLIC AC 27MG-0.8MG
TABLET ORAL DAILY
COMMUNITY

## 2021-04-27 NOTE — PROGRESS NOTES
"Chief complaint:postop check    HPI  Carey Armando is a 36 y.o. female presents for postop and postpartum check. She passed a blood clot over the weekend and called MD. She held her lovenox that day then resumed dosing. She notes her bleeding has been light since that time.         The following portions of the patient's history were reviewed and updated as appropriate: allergies, current medications, past family history, past medical history, past social history, past surgical history and problem list.    Review of Systems  Pertinent items are noted in HPI.    /60   Ht 170.2 cm (67\")   Wt 82.4 kg (181 lb 9.6 oz)   LMP 2020   Breastfeeding Yes   BMI 28.44 kg/m²         Physical Exam  Constitutional:       Appearance: Normal appearance.   Pulmonary:      Effort: Pulmonary effort is normal.   Abdominal:      General: There is no distension.      Palpations: Abdomen is soft.      Tenderness: There is no abdominal tenderness. There is no guarding.      Comments: Incision clean/dry/intact; no surrounding erythema or swelling   Musculoskeletal:      Comments: Bilateral lower extremities are without cords, tenderness or edema   Neurological:      General: No focal deficit present.      Mental Status: She is alert and oriented to person, place, and time.   Psychiatric:         Mood and Affect: Mood normal.         Behavior: Behavior normal.             Diagnoses and all orders for this visit:    1. Postoperative examination (Primary)    2. Antithrombin 3 deficiency (CMS/HCC)    3.  delivery delivered    Other orders  -     enoxaparin (LOVENOX) 40 MG/0.4ML solution syringe; Inject 0.4 mL under the skin into the appropriate area as directed Daily for 30 days.  Dispense: 11.2 mL; Refill: 1      Normal postpartum incision check. Plan f/u 1 weeks for additional evaluation. Advised pt to call if she has any recurrent heavy bleeding. Plan continue lovenox daily through 8 weeks postpartum. Pt plans condoms " for contraception.

## 2021-04-28 ENCOUNTER — APPOINTMENT (OUTPATIENT)
Dept: GENERAL RADIOLOGY | Facility: HOSPITAL | Age: 36
End: 2021-04-28

## 2021-04-28 ENCOUNTER — ANESTHESIA EVENT (OUTPATIENT)
Dept: PERIOP | Facility: HOSPITAL | Age: 36
End: 2021-04-28

## 2021-04-28 ENCOUNTER — ANESTHESIA (OUTPATIENT)
Dept: PERIOP | Facility: HOSPITAL | Age: 36
End: 2021-04-28

## 2021-04-28 PROBLEM — N99.820 POSTOPERATIVE VAGINAL BLEEDING FOLLOWING GENITOURINARY PROCEDURE: Status: ACTIVE | Noted: 2021-04-28

## 2021-04-28 PROBLEM — N93.9 VAGINA BLEEDING: Status: ACTIVE | Noted: 2021-04-28

## 2021-04-28 LAB
APTT PPP: 25.8 SECONDS (ref 22.7–35.4)
APTT PPP: 28.5 SECONDS (ref 22.7–35.4)
BASE EXCESS BLDA CALC-SCNC: -3 MMOL/L (ref -5–5)
BASOPHILS # BLD AUTO: 0.05 10*3/MM3 (ref 0–0.2)
BASOPHILS NFR BLD AUTO: 0.3 % (ref 0–1.5)
CA-I BLDA-SCNC: ABNORMAL MMOL/L
CO2 BLDA-SCNC: 25 MMOL/L (ref 24–29)
DEPRECATED RDW RBC AUTO: 40.6 FL (ref 37–54)
DEPRECATED RDW RBC AUTO: 41.5 FL (ref 37–54)
DEPRECATED RDW RBC AUTO: 41.7 FL (ref 37–54)
DEPRECATED RDW RBC AUTO: 42 FL (ref 37–54)
EOSINOPHIL # BLD AUTO: 0.01 10*3/MM3 (ref 0–0.4)
EOSINOPHIL NFR BLD AUTO: 0.1 % (ref 0.3–6.2)
ERYTHROCYTE [DISTWIDTH] IN BLOOD BY AUTOMATED COUNT: 12.8 % (ref 12.3–15.4)
ERYTHROCYTE [DISTWIDTH] IN BLOOD BY AUTOMATED COUNT: 12.8 % (ref 12.3–15.4)
ERYTHROCYTE [DISTWIDTH] IN BLOOD BY AUTOMATED COUNT: 12.9 % (ref 12.3–15.4)
ERYTHROCYTE [DISTWIDTH] IN BLOOD BY AUTOMATED COUNT: 12.9 % (ref 12.3–15.4)
FIBRINOGEN PPP-MCNC: 340 MG/DL (ref 219–464)
FIBRINOGEN PPP-MCNC: 370 MG/DL (ref 219–464)
FIBRINOGEN PPP-MCNC: 408 MG/DL (ref 219–464)
GLUCOSE BLDC GLUCOMTR-MCNC: 98 MG/DL (ref 70–130)
HCO3 BLDA-SCNC: 23.4 MMOL/L (ref 22–26)
HCT VFR BLD AUTO: 24.1 % (ref 34–46.6)
HCT VFR BLD AUTO: 28.3 % (ref 34–46.6)
HCT VFR BLD AUTO: 31.9 % (ref 34–46.6)
HCT VFR BLD AUTO: 34.9 % (ref 34–46.6)
HCT VFR BLDA CALC: 51 % (ref 38–51)
HGB BLD-MCNC: 11 G/DL (ref 12–15.9)
HGB BLD-MCNC: 11.9 G/DL (ref 12–15.9)
HGB BLD-MCNC: 8.4 G/DL (ref 12–15.9)
HGB BLD-MCNC: 9.9 G/DL (ref 12–15.9)
HGB BLDA-MCNC: 17.3 G/DL (ref 12–17)
IMM GRANULOCYTES # BLD AUTO: 0.14 10*3/MM3 (ref 0–0.05)
IMM GRANULOCYTES NFR BLD AUTO: 1 % (ref 0–0.5)
INR PPP: 0.97 (ref 0.9–1.1)
INR PPP: 0.99 (ref 0.9–1.1)
INR PPP: 1.04 (ref 0.9–1.1)
LYMPHOCYTES # BLD AUTO: 1.52 10*3/MM3 (ref 0.7–3.1)
LYMPHOCYTES NFR BLD AUTO: 10.4 % (ref 19.6–45.3)
MCH RBC QN AUTO: 30.3 PG (ref 26.6–33)
MCH RBC QN AUTO: 30.8 PG (ref 26.6–33)
MCH RBC QN AUTO: 31.2 PG (ref 26.6–33)
MCH RBC QN AUTO: 31.2 PG (ref 26.6–33)
MCHC RBC AUTO-ENTMCNC: 34.1 G/DL (ref 31.5–35.7)
MCHC RBC AUTO-ENTMCNC: 34.5 G/DL (ref 31.5–35.7)
MCHC RBC AUTO-ENTMCNC: 34.9 G/DL (ref 31.5–35.7)
MCHC RBC AUTO-ENTMCNC: 35 G/DL (ref 31.5–35.7)
MCV RBC AUTO: 87.9 FL (ref 79–97)
MCV RBC AUTO: 89.3 FL (ref 79–97)
MCV RBC AUTO: 89.6 FL (ref 79–97)
MCV RBC AUTO: 90.4 FL (ref 79–97)
MONOCYTES # BLD AUTO: 0.31 10*3/MM3 (ref 0.1–0.9)
MONOCYTES NFR BLD AUTO: 2.1 % (ref 5–12)
NEUTROPHILS NFR BLD AUTO: 12.54 10*3/MM3 (ref 1.7–7)
NEUTROPHILS NFR BLD AUTO: 86.1 % (ref 42.7–76)
NRBC BLD AUTO-RTO: 0 /100 WBC (ref 0–0.2)
PCO2 BLDA: 46.2 MM HG (ref 35–45)
PH BLDA: 7.31 PH UNITS (ref 7.35–7.6)
PLATELET # BLD AUTO: 251 10*3/MM3 (ref 140–450)
PLATELET # BLD AUTO: 300 10*3/MM3 (ref 140–450)
PLATELET # BLD AUTO: 327 10*3/MM3 (ref 140–450)
PLATELET # BLD AUTO: 339 10*3/MM3 (ref 140–450)
PMV BLD AUTO: 9.4 FL (ref 6–12)
PMV BLD AUTO: 9.5 FL (ref 6–12)
PMV BLD AUTO: 9.5 FL (ref 6–12)
PMV BLD AUTO: 9.6 FL (ref 6–12)
PO2 BLDA: 233 MMHG (ref 80–105)
POTASSIUM BLDA-SCNC: 3.7 MMOL/L (ref 3.5–4.9)
PROTHROMBIN TIME: 12.7 SECONDS (ref 11.7–14.2)
PROTHROMBIN TIME: 12.9 SECONDS (ref 11.7–14.2)
PROTHROMBIN TIME: 13.4 SECONDS (ref 11.7–14.2)
RBC # BLD AUTO: 2.69 10*6/MM3 (ref 3.77–5.28)
RBC # BLD AUTO: 3.17 10*6/MM3 (ref 3.77–5.28)
RBC # BLD AUTO: 3.63 10*6/MM3 (ref 3.77–5.28)
RBC # BLD AUTO: 3.86 10*6/MM3 (ref 3.77–5.28)
RESIDUAL RHIG DETECTED: NORMAL
SAO2 % BLDA: 100 % (ref 95–98)
SARS-COV-2 ORF1AB RESP QL NAA+PROBE: NOT DETECTED
WBC # BLD AUTO: 14.57 10*3/MM3 (ref 3.4–10.8)
WBC # BLD AUTO: 18.85 10*3/MM3 (ref 3.4–10.8)
WBC # BLD AUTO: 19.74 10*3/MM3 (ref 3.4–10.8)
WBC # BLD AUTO: 8.11 10*3/MM3 (ref 3.4–10.8)

## 2021-04-28 PROCEDURE — C1769 GUIDE WIRE: HCPCS | Performed by: RADIOLOGY

## 2021-04-28 PROCEDURE — 25010000002 FENTANYL CITRATE (PF) 100 MCG/2ML SOLUTION: Performed by: NURSE ANESTHETIST, CERTIFIED REGISTERED

## 2021-04-28 PROCEDURE — U0004 COV-19 TEST NON-CDC HGH THRU: HCPCS | Performed by: EMERGENCY MEDICINE

## 2021-04-28 PROCEDURE — P9016 RBC LEUKOCYTES REDUCED: HCPCS

## 2021-04-28 PROCEDURE — 85027 COMPLETE CBC AUTOMATED: CPT | Performed by: OBSTETRICS & GYNECOLOGY

## 2021-04-28 PROCEDURE — 25010000002 NEOSTIGMINE 5 MG/10ML SOLUTION: Performed by: NURSE ANESTHETIST, CERTIFIED REGISTERED

## 2021-04-28 PROCEDURE — C1887 CATHETER, GUIDING: HCPCS | Performed by: RADIOLOGY

## 2021-04-28 PROCEDURE — 10D17Z9 MANUAL EXTRACTION OF PRODUCTS OF CONCEPTION, RETAINED, VIA NATURAL OR ARTIFICIAL OPENING: ICD-10-PCS | Performed by: OBSTETRICS & GYNECOLOGY

## 2021-04-28 PROCEDURE — 25010000002 HYDROMORPHONE PER 4 MG: Performed by: OBSTETRICS & GYNECOLOGY

## 2021-04-28 PROCEDURE — 25010000002 ONDANSETRON PER 1 MG: Performed by: NURSE ANESTHETIST, CERTIFIED REGISTERED

## 2021-04-28 PROCEDURE — 0 IOPAMIDOL PER 1 ML: Performed by: OBSTETRICS & GYNECOLOGY

## 2021-04-28 PROCEDURE — 25010000002 HEPARIN (PORCINE) PER 1000 UNITS: Performed by: RADIOLOGY

## 2021-04-28 PROCEDURE — 82947 ASSAY GLUCOSE BLOOD QUANT: CPT

## 2021-04-28 PROCEDURE — 36430 TRANSFUSION BLD/BLD COMPNT: CPT

## 2021-04-28 PROCEDURE — 85384 FIBRINOGEN ACTIVITY: CPT | Performed by: OBSTETRICS & GYNECOLOGY

## 2021-04-28 PROCEDURE — C1894 INTRO/SHEATH, NON-LASER: HCPCS | Performed by: RADIOLOGY

## 2021-04-28 PROCEDURE — 59899 UNLISTED PX MAT CARE&DLVR: CPT | Performed by: OBSTETRICS & GYNECOLOGY

## 2021-04-28 PROCEDURE — 25010000002 PHENYLEPHRINE PER 1 ML: Performed by: NURSE ANESTHETIST, CERTIFIED REGISTERED

## 2021-04-28 PROCEDURE — 85018 HEMOGLOBIN: CPT

## 2021-04-28 PROCEDURE — 25010000002 DEXAMETHASONE PER 1 MG: Performed by: NURSE ANESTHETIST, CERTIFIED REGISTERED

## 2021-04-28 PROCEDURE — 85014 HEMATOCRIT: CPT

## 2021-04-28 PROCEDURE — 85610 PROTHROMBIN TIME: CPT | Performed by: OBSTETRICS & GYNECOLOGY

## 2021-04-28 PROCEDURE — S0260 H&P FOR SURGERY: HCPCS | Performed by: OBSTETRICS & GYNECOLOGY

## 2021-04-28 PROCEDURE — 25010000003 CEFAZOLIN IN DEXTROSE 2-4 GM/100ML-% SOLUTION: Performed by: NURSE ANESTHETIST, CERTIFIED REGISTERED

## 2021-04-28 PROCEDURE — 25010000002 HYDROMORPHONE PER 4 MG: Performed by: NURSE ANESTHETIST, CERTIFIED REGISTERED

## 2021-04-28 PROCEDURE — 59160 D & C AFTER DELIVERY: CPT | Performed by: OBSTETRICS & GYNECOLOGY

## 2021-04-28 PROCEDURE — 85730 THROMBOPLASTIN TIME PARTIAL: CPT | Performed by: OBSTETRICS & GYNECOLOGY

## 2021-04-28 PROCEDURE — C2628 CATHETER, OCCLUSION: HCPCS | Performed by: OBSTETRICS & GYNECOLOGY

## 2021-04-28 PROCEDURE — 25010000002 SUCCINYLCHOLINE PER 20 MG: Performed by: NURSE ANESTHETIST, CERTIFIED REGISTERED

## 2021-04-28 PROCEDURE — 86900 BLOOD TYPING SEROLOGIC ABO: CPT

## 2021-04-28 PROCEDURE — 75894 X-RAYS TRANSCATH THERAPY: CPT

## 2021-04-28 PROCEDURE — 76998 US GUIDE INTRAOP: CPT | Performed by: OBSTETRICS & GYNECOLOGY

## 2021-04-28 PROCEDURE — 25010000002 PROPOFOL 10 MG/ML EMULSION: Performed by: NURSE ANESTHETIST, CERTIFIED REGISTERED

## 2021-04-28 PROCEDURE — 25010000003 LIDOCAINE 1 % SOLUTION: Performed by: RADIOLOGY

## 2021-04-28 PROCEDURE — 85025 COMPLETE CBC W/AUTO DIFF WBC: CPT | Performed by: OBSTETRICS & GYNECOLOGY

## 2021-04-28 PROCEDURE — 82803 BLOOD GASES ANY COMBINATION: CPT

## 2021-04-28 RX ORDER — NEOSTIGMINE METHYLSULFATE 0.5 MG/ML
INJECTION, SOLUTION INTRAVENOUS AS NEEDED
Status: DISCONTINUED | OUTPATIENT
Start: 2021-04-28 | End: 2021-04-28 | Stop reason: SURG

## 2021-04-28 RX ORDER — ONDANSETRON 2 MG/ML
4 INJECTION INTRAMUSCULAR; INTRAVENOUS ONCE AS NEEDED
Status: DISCONTINUED | OUTPATIENT
Start: 2021-04-28 | End: 2021-04-28 | Stop reason: HOSPADM

## 2021-04-28 RX ORDER — OXYTOCIN-SODIUM CHLORIDE 0.9% IV SOLN 30 UNIT/500ML 30-0.9/5 UT/ML-%
250 SOLUTION INTRAVENOUS ONCE
Status: COMPLETED | OUTPATIENT
Start: 2021-04-28 | End: 2021-04-29

## 2021-04-28 RX ORDER — SODIUM CHLORIDE 9 MG/ML
INJECTION, SOLUTION INTRAVENOUS CONTINUOUS PRN
Status: DISCONTINUED | OUTPATIENT
Start: 2021-04-28 | End: 2021-04-28 | Stop reason: SURG

## 2021-04-28 RX ORDER — NALOXONE HCL 0.4 MG/ML
0.4 VIAL (ML) INJECTION
Status: DISCONTINUED | OUTPATIENT
Start: 2021-04-28 | End: 2021-05-01 | Stop reason: HOSPADM

## 2021-04-28 RX ORDER — OXYTOCIN-SODIUM CHLORIDE 0.9% IV SOLN 30 UNIT/500ML 30-0.9/5 UT/ML-%
250 SOLUTION INTRAVENOUS CONTINUOUS
Status: DISCONTINUED | OUTPATIENT
Start: 2021-04-28 | End: 2021-04-28

## 2021-04-28 RX ORDER — EPHEDRINE SULFATE 50 MG/ML
INJECTION, SOLUTION INTRAVENOUS AS NEEDED
Status: DISCONTINUED | OUTPATIENT
Start: 2021-04-28 | End: 2021-04-28 | Stop reason: SURG

## 2021-04-28 RX ORDER — OXYCODONE AND ACETAMINOPHEN 7.5; 325 MG/1; MG/1
1 TABLET ORAL ONCE AS NEEDED
Status: COMPLETED | OUTPATIENT
Start: 2021-04-28 | End: 2021-04-28

## 2021-04-28 RX ORDER — LIDOCAINE HYDROCHLORIDE 10 MG/ML
INJECTION, SOLUTION INFILTRATION; PERINEURAL AS NEEDED
Status: DISCONTINUED | OUTPATIENT
Start: 2021-04-28 | End: 2021-04-28 | Stop reason: HOSPADM

## 2021-04-28 RX ORDER — CEFAZOLIN SODIUM 2 G/100ML
INJECTION, SOLUTION INTRAVENOUS AS NEEDED
Status: DISCONTINUED | OUTPATIENT
Start: 2021-04-28 | End: 2021-04-28 | Stop reason: SURG

## 2021-04-28 RX ORDER — HYDROMORPHONE HYDROCHLORIDE 1 MG/ML
0.5 INJECTION, SOLUTION INTRAMUSCULAR; INTRAVENOUS; SUBCUTANEOUS
Status: DISCONTINUED | OUTPATIENT
Start: 2021-04-28 | End: 2021-04-28 | Stop reason: HOSPADM

## 2021-04-28 RX ORDER — LIDOCAINE HYDROCHLORIDE 20 MG/ML
INJECTION, SOLUTION INFILTRATION; PERINEURAL AS NEEDED
Status: DISCONTINUED | OUTPATIENT
Start: 2021-04-28 | End: 2021-04-28 | Stop reason: SURG

## 2021-04-28 RX ORDER — FENTANYL CITRATE 50 UG/ML
INJECTION, SOLUTION INTRAMUSCULAR; INTRAVENOUS AS NEEDED
Status: DISCONTINUED | OUTPATIENT
Start: 2021-04-28 | End: 2021-04-28 | Stop reason: SURG

## 2021-04-28 RX ORDER — SODIUM CHLORIDE, SODIUM LACTATE, POTASSIUM CHLORIDE, CALCIUM CHLORIDE 600; 310; 30; 20 MG/100ML; MG/100ML; MG/100ML; MG/100ML
125 INJECTION, SOLUTION INTRAVENOUS CONTINUOUS
Status: DISCONTINUED | OUTPATIENT
Start: 2021-04-28 | End: 2021-04-29

## 2021-04-28 RX ORDER — PROMETHAZINE HYDROCHLORIDE 25 MG/1
25 SUPPOSITORY RECTAL ONCE AS NEEDED
Status: DISCONTINUED | OUTPATIENT
Start: 2021-04-28 | End: 2021-04-28 | Stop reason: HOSPADM

## 2021-04-28 RX ORDER — BISACODYL 10 MG
10 SUPPOSITORY, RECTAL RECTAL DAILY PRN
Status: DISCONTINUED | OUTPATIENT
Start: 2021-04-29 | End: 2021-05-01 | Stop reason: HOSPADM

## 2021-04-28 RX ORDER — EPHEDRINE SULFATE 50 MG/ML
5 INJECTION, SOLUTION INTRAVENOUS ONCE AS NEEDED
Status: DISCONTINUED | OUTPATIENT
Start: 2021-04-28 | End: 2021-04-28 | Stop reason: HOSPADM

## 2021-04-28 RX ORDER — MISOPROSTOL 200 UG/1
TABLET ORAL
Status: COMPLETED
Start: 2021-04-28 | End: 2021-04-28

## 2021-04-28 RX ORDER — IBUPROFEN 400 MG/1
600 TABLET ORAL ONCE
Status: COMPLETED | OUTPATIENT
Start: 2021-04-28 | End: 2021-04-28

## 2021-04-28 RX ORDER — LIDOCAINE HYDROCHLORIDE 10 MG/ML
0.5 INJECTION, SOLUTION EPIDURAL; INFILTRATION; INTRACAUDAL; PERINEURAL ONCE AS NEEDED
Status: CANCELLED | OUTPATIENT
Start: 2021-04-28

## 2021-04-28 RX ORDER — PROMETHAZINE HYDROCHLORIDE 25 MG/1
25 TABLET ORAL ONCE AS NEEDED
Status: DISCONTINUED | OUTPATIENT
Start: 2021-04-28 | End: 2021-04-28 | Stop reason: HOSPADM

## 2021-04-28 RX ORDER — ONDANSETRON 2 MG/ML
4 INJECTION INTRAMUSCULAR; INTRAVENOUS ONCE AS NEEDED
Status: COMPLETED | OUTPATIENT
Start: 2021-04-28 | End: 2021-04-28

## 2021-04-28 RX ORDER — HYDROCODONE BITARTRATE AND ACETAMINOPHEN 7.5; 325 MG/1; MG/1
1 TABLET ORAL ONCE AS NEEDED
Status: DISCONTINUED | OUTPATIENT
Start: 2021-04-28 | End: 2021-04-28 | Stop reason: HOSPADM

## 2021-04-28 RX ORDER — LABETALOL HYDROCHLORIDE 5 MG/ML
5 INJECTION, SOLUTION INTRAVENOUS
Status: DISCONTINUED | OUTPATIENT
Start: 2021-04-28 | End: 2021-04-28 | Stop reason: HOSPADM

## 2021-04-28 RX ORDER — DIPHENHYDRAMINE HCL 25 MG
25 CAPSULE ORAL NIGHTLY PRN
Status: DISCONTINUED | OUTPATIENT
Start: 2021-04-28 | End: 2021-05-01 | Stop reason: HOSPADM

## 2021-04-28 RX ORDER — ACETAMINOPHEN 160 MG/5ML
650 SOLUTION ORAL EVERY 4 HOURS PRN
Status: DISCONTINUED | OUTPATIENT
Start: 2021-04-28 | End: 2021-04-29

## 2021-04-28 RX ORDER — IBUPROFEN 600 MG/1
600 TABLET ORAL EVERY 8 HOURS PRN
Status: DISCONTINUED | OUTPATIENT
Start: 2021-04-28 | End: 2021-05-01 | Stop reason: HOSPADM

## 2021-04-28 RX ORDER — ONDANSETRON 2 MG/ML
4 INJECTION INTRAMUSCULAR; INTRAVENOUS EVERY 6 HOURS PRN
Status: DISCONTINUED | OUTPATIENT
Start: 2021-04-28 | End: 2021-05-01 | Stop reason: HOSPADM

## 2021-04-28 RX ORDER — HYDROMORPHONE HYDROCHLORIDE 1 MG/ML
0.5 INJECTION, SOLUTION INTRAMUSCULAR; INTRAVENOUS; SUBCUTANEOUS
Status: DISCONTINUED | OUTPATIENT
Start: 2021-04-28 | End: 2021-05-01 | Stop reason: HOSPADM

## 2021-04-28 RX ORDER — ROCURONIUM BROMIDE 10 MG/ML
INJECTION, SOLUTION INTRAVENOUS AS NEEDED
Status: DISCONTINUED | OUTPATIENT
Start: 2021-04-28 | End: 2021-04-28 | Stop reason: SURG

## 2021-04-28 RX ORDER — OXYCODONE HYDROCHLORIDE AND ACETAMINOPHEN 5; 325 MG/1; MG/1
1 TABLET ORAL EVERY 4 HOURS PRN
Status: DISCONTINUED | OUTPATIENT
Start: 2021-04-28 | End: 2021-04-28

## 2021-04-28 RX ORDER — MISOPROSTOL 200 UG/1
800 TABLET ORAL ONCE
Status: COMPLETED | OUTPATIENT
Start: 2021-04-28 | End: 2021-04-28

## 2021-04-28 RX ORDER — METHYLERGONOVINE MALEATE 0.2 MG/1
200 TABLET ORAL EVERY 6 HOURS SCHEDULED
Status: DISPENSED | OUTPATIENT
Start: 2021-04-28 | End: 2021-04-28

## 2021-04-28 RX ORDER — SUCCINYLCHOLINE CHLORIDE 20 MG/ML
INJECTION INTRAMUSCULAR; INTRAVENOUS AS NEEDED
Status: DISCONTINUED | OUTPATIENT
Start: 2021-04-28 | End: 2021-04-28 | Stop reason: SURG

## 2021-04-28 RX ORDER — ACETAMINOPHEN 500 MG
1000 TABLET ORAL EVERY 8 HOURS
Status: DISCONTINUED | OUTPATIENT
Start: 2021-04-28 | End: 2021-04-29

## 2021-04-28 RX ORDER — GLYCOPYRROLATE 0.2 MG/ML
INJECTION INTRAMUSCULAR; INTRAVENOUS AS NEEDED
Status: DISCONTINUED | OUTPATIENT
Start: 2021-04-28 | End: 2021-04-28 | Stop reason: SURG

## 2021-04-28 RX ORDER — DEXMEDETOMIDINE HYDROCHLORIDE 100 UG/ML
INJECTION, SOLUTION INTRAVENOUS AS NEEDED
Status: DISCONTINUED | OUTPATIENT
Start: 2021-04-28 | End: 2021-04-28 | Stop reason: SURG

## 2021-04-28 RX ORDER — ACETAMINOPHEN 650 MG/1
650 SUPPOSITORY RECTAL EVERY 4 HOURS PRN
Status: DISCONTINUED | OUTPATIENT
Start: 2021-04-28 | End: 2021-04-29

## 2021-04-28 RX ORDER — FLUMAZENIL 0.1 MG/ML
0.2 INJECTION INTRAVENOUS AS NEEDED
Status: DISCONTINUED | OUTPATIENT
Start: 2021-04-28 | End: 2021-04-28 | Stop reason: HOSPADM

## 2021-04-28 RX ORDER — SODIUM CHLORIDE, SODIUM LACTATE, POTASSIUM CHLORIDE, CALCIUM CHLORIDE 600; 310; 30; 20 MG/100ML; MG/100ML; MG/100ML; MG/100ML
9 INJECTION, SOLUTION INTRAVENOUS CONTINUOUS
Status: CANCELLED | OUTPATIENT
Start: 2021-04-28

## 2021-04-28 RX ORDER — DOCUSATE SODIUM 100 MG/1
100 CAPSULE, LIQUID FILLED ORAL 2 TIMES DAILY
Status: DISCONTINUED | OUTPATIENT
Start: 2021-04-28 | End: 2021-05-01 | Stop reason: HOSPADM

## 2021-04-28 RX ORDER — MISOPROSTOL 200 UG/1
600 TABLET ORAL ONCE
Status: COMPLETED | OUTPATIENT
Start: 2021-04-28 | End: 2021-04-28

## 2021-04-28 RX ORDER — SODIUM CHLORIDE 0.9 % (FLUSH) 0.9 %
3-10 SYRINGE (ML) INJECTION AS NEEDED
Status: CANCELLED | OUTPATIENT
Start: 2021-04-28

## 2021-04-28 RX ORDER — OXYCODONE AND ACETAMINOPHEN 7.5; 325 MG/1; MG/1
1 TABLET ORAL ONCE AS NEEDED
Status: DISCONTINUED | OUTPATIENT
Start: 2021-04-28 | End: 2021-04-28 | Stop reason: HOSPADM

## 2021-04-28 RX ORDER — OXYCODONE HYDROCHLORIDE AND ACETAMINOPHEN 5; 325 MG/1; MG/1
1 TABLET ORAL EVERY 4 HOURS PRN
Status: DISCONTINUED | OUTPATIENT
Start: 2021-04-28 | End: 2021-05-01 | Stop reason: HOSPADM

## 2021-04-28 RX ORDER — OXYTOCIN 10 [USP'U]/ML
0.5 INJECTION, SOLUTION INTRAMUSCULAR; INTRAVENOUS
Status: CANCELLED | OUTPATIENT
Start: 2021-04-28

## 2021-04-28 RX ORDER — DEXAMETHASONE SODIUM PHOSPHATE 10 MG/ML
INJECTION INTRAMUSCULAR; INTRAVENOUS AS NEEDED
Status: DISCONTINUED | OUTPATIENT
Start: 2021-04-28 | End: 2021-04-28 | Stop reason: SURG

## 2021-04-28 RX ORDER — DOCUSATE SODIUM 100 MG/1
100 CAPSULE, LIQUID FILLED ORAL 2 TIMES DAILY
Status: DISCONTINUED | OUTPATIENT
Start: 2021-04-28 | End: 2021-04-28 | Stop reason: SDUPTHER

## 2021-04-28 RX ORDER — HYDROCORTISONE 25 MG/G
1 CREAM TOPICAL AS NEEDED
Status: DISCONTINUED | OUTPATIENT
Start: 2021-04-28 | End: 2021-05-01 | Stop reason: HOSPADM

## 2021-04-28 RX ORDER — ONDANSETRON 2 MG/ML
4 INJECTION INTRAMUSCULAR; INTRAVENOUS EVERY 6 HOURS PRN
Status: DISCONTINUED | OUTPATIENT
Start: 2021-04-28 | End: 2021-04-28 | Stop reason: SDUPTHER

## 2021-04-28 RX ORDER — NALOXONE HCL 0.4 MG/ML
0.2 VIAL (ML) INJECTION AS NEEDED
Status: DISCONTINUED | OUTPATIENT
Start: 2021-04-28 | End: 2021-04-28 | Stop reason: HOSPADM

## 2021-04-28 RX ORDER — NALOXONE HCL 0.4 MG/ML
0.1 VIAL (ML) INJECTION
Status: DISCONTINUED | OUTPATIENT
Start: 2021-04-28 | End: 2021-05-01 | Stop reason: HOSPADM

## 2021-04-28 RX ORDER — DIPHENHYDRAMINE HCL 25 MG
25 CAPSULE ORAL
Status: DISCONTINUED | OUTPATIENT
Start: 2021-04-28 | End: 2021-04-28 | Stop reason: HOSPADM

## 2021-04-28 RX ORDER — IBUPROFEN 600 MG/1
600 TABLET ORAL EVERY 6 HOURS PRN
COMMUNITY
End: 2021-05-01 | Stop reason: HOSPADM

## 2021-04-28 RX ORDER — DIPHENHYDRAMINE HYDROCHLORIDE 50 MG/ML
12.5 INJECTION INTRAMUSCULAR; INTRAVENOUS
Status: DISCONTINUED | OUTPATIENT
Start: 2021-04-28 | End: 2021-04-28 | Stop reason: HOSPADM

## 2021-04-28 RX ORDER — OXYCODONE HYDROCHLORIDE AND ACETAMINOPHEN 5; 325 MG/1; MG/1
2 TABLET ORAL EVERY 4 HOURS PRN
Status: DISCONTINUED | OUTPATIENT
Start: 2021-04-28 | End: 2021-04-28

## 2021-04-28 RX ORDER — ONDANSETRON 2 MG/ML
INJECTION INTRAMUSCULAR; INTRAVENOUS AS NEEDED
Status: DISCONTINUED | OUTPATIENT
Start: 2021-04-28 | End: 2021-04-28 | Stop reason: SURG

## 2021-04-28 RX ORDER — CLINDAMYCIN PHOSPHATE 600 MG/50ML
600 INJECTION INTRAVENOUS ONCE
Status: COMPLETED | OUTPATIENT
Start: 2021-04-28 | End: 2021-04-28

## 2021-04-28 RX ORDER — PROPOFOL 10 MG/ML
VIAL (ML) INTRAVENOUS AS NEEDED
Status: DISCONTINUED | OUTPATIENT
Start: 2021-04-28 | End: 2021-04-28 | Stop reason: SURG

## 2021-04-28 RX ORDER — SODIUM CHLORIDE, SODIUM LACTATE, POTASSIUM CHLORIDE, CALCIUM CHLORIDE 600; 310; 30; 20 MG/100ML; MG/100ML; MG/100ML; MG/100ML
INJECTION, SOLUTION INTRAVENOUS CONTINUOUS PRN
Status: DISCONTINUED | OUTPATIENT
Start: 2021-04-28 | End: 2021-04-28 | Stop reason: SURG

## 2021-04-28 RX ORDER — OXYCODONE AND ACETAMINOPHEN 7.5; 325 MG/1; MG/1
2 TABLET ORAL EVERY 4 HOURS PRN
Status: DISCONTINUED | OUTPATIENT
Start: 2021-04-28 | End: 2021-05-01 | Stop reason: HOSPADM

## 2021-04-28 RX ORDER — ONDANSETRON 4 MG/1
4 TABLET, FILM COATED ORAL EVERY 6 HOURS PRN
Status: DISCONTINUED | OUTPATIENT
Start: 2021-04-28 | End: 2021-04-28 | Stop reason: SDUPTHER

## 2021-04-28 RX ORDER — FENTANYL CITRATE 50 UG/ML
50 INJECTION, SOLUTION INTRAMUSCULAR; INTRAVENOUS
Status: DISCONTINUED | OUTPATIENT
Start: 2021-04-28 | End: 2021-04-28 | Stop reason: HOSPADM

## 2021-04-28 RX ORDER — FENTANYL CITRATE 50 UG/ML
50 INJECTION, SOLUTION INTRAMUSCULAR; INTRAVENOUS
Status: CANCELLED | OUTPATIENT
Start: 2021-04-28

## 2021-04-28 RX ORDER — OXYTOCIN-SODIUM CHLORIDE 0.9% IV SOLN 30 UNIT/500ML 30-0.9/5 UT/ML-%
999 SOLUTION INTRAVENOUS ONCE
Status: COMPLETED | OUTPATIENT
Start: 2021-04-28 | End: 2021-04-28

## 2021-04-28 RX ORDER — OXYTOCIN-SODIUM CHLORIDE 0.9% IV SOLN 30 UNIT/500ML 30-0.9/5 UT/ML-%
125 SOLUTION INTRAVENOUS CONTINUOUS PRN
Status: DISCONTINUED | OUTPATIENT
Start: 2021-04-28 | End: 2021-04-28

## 2021-04-28 RX ORDER — SODIUM CHLORIDE 0.9 % (FLUSH) 0.9 %
3 SYRINGE (ML) INJECTION EVERY 12 HOURS SCHEDULED
Status: CANCELLED | OUTPATIENT
Start: 2021-04-28

## 2021-04-28 RX ADMIN — FENTANYL CITRATE 50 MCG: 50 INJECTION INTRAMUSCULAR; INTRAVENOUS at 07:54

## 2021-04-28 RX ADMIN — METHYLERGONOVINE 200 MCG: 0.2 TABLET ORAL at 03:14

## 2021-04-28 RX ADMIN — CLINDAMYCIN IN 5 PERCENT DEXTROSE 600 MG: 12 INJECTION, SOLUTION INTRAVENOUS at 05:45

## 2021-04-28 RX ADMIN — HYDROMORPHONE HYDROCHLORIDE 0.5 MG: 1 INJECTION, SOLUTION INTRAMUSCULAR; INTRAVENOUS; SUBCUTANEOUS at 08:37

## 2021-04-28 RX ADMIN — FENTANYL CITRATE 50 MCG: 50 INJECTION INTRAMUSCULAR; INTRAVENOUS at 05:29

## 2021-04-28 RX ADMIN — DEXMEDETOMIDINE 5 MCG: 100 INJECTION, SOLUTION, CONCENTRATE INTRAVENOUS at 06:52

## 2021-04-28 RX ADMIN — HYDROMORPHONE HYDROCHLORIDE 0.5 MG: 1 INJECTION, SOLUTION INTRAMUSCULAR; INTRAVENOUS; SUBCUTANEOUS at 09:10

## 2021-04-28 RX ADMIN — GLYCOPYRROLATE 0.1 MG: 0.2 INJECTION INTRAMUSCULAR; INTRAVENOUS at 12:32

## 2021-04-28 RX ADMIN — SODIUM CHLORIDE: 9 INJECTION, SOLUTION INTRAVENOUS at 12:39

## 2021-04-28 RX ADMIN — DEXMEDETOMIDINE 10 MCG: 100 INJECTION, SOLUTION, CONCENTRATE INTRAVENOUS at 06:46

## 2021-04-28 RX ADMIN — EPHEDRINE SULFATE 5 MG: 50 INJECTION INTRAVENOUS at 06:24

## 2021-04-28 RX ADMIN — MISOPROSTOL 600 MCG: 200 TABLET ORAL at 04:37

## 2021-04-28 RX ADMIN — DEXAMETHASONE SODIUM PHOSPHATE 8 MG: 10 INJECTION INTRAMUSCULAR; INTRAVENOUS at 12:37

## 2021-04-28 RX ADMIN — GLYCOPYRROLATE 0.2 MG: 0.2 INJECTION INTRAMUSCULAR; INTRAVENOUS at 06:52

## 2021-04-28 RX ADMIN — METHYLERGONOVINE 200 MCG: 0.2 TABLET ORAL at 19:35

## 2021-04-28 RX ADMIN — OXYTOCIN 250 ML/HR: 10 INJECTION INTRAVENOUS at 22:22

## 2021-04-28 RX ADMIN — CEFAZOLIN SODIUM 2 G: 2 INJECTION, SOLUTION INTRAVENOUS at 12:44

## 2021-04-28 RX ADMIN — SUCCINYLCHOLINE CHLORIDE 80 MG: 20 INJECTION, SOLUTION INTRAMUSCULAR; INTRAVENOUS; PARENTERAL at 05:29

## 2021-04-28 RX ADMIN — OXYTOCIN 83.33 MILLI-UNITS/MIN: 10 INJECTION, SOLUTION INTRAMUSCULAR; INTRAVENOUS at 06:14

## 2021-04-28 RX ADMIN — PHENYLEPHRINE HYDROCHLORIDE 100 MCG: 10 INJECTION INTRAVENOUS at 12:38

## 2021-04-28 RX ADMIN — SODIUM CHLORIDE, POTASSIUM CHLORIDE, SODIUM LACTATE AND CALCIUM CHLORIDE: 600; 310; 30; 20 INJECTION, SOLUTION INTRAVENOUS at 05:28

## 2021-04-28 RX ADMIN — FENTANYL CITRATE 50 MCG: 50 INJECTION INTRAMUSCULAR; INTRAVENOUS at 12:32

## 2021-04-28 RX ADMIN — MISOPROSTOL 800 MCG: 200 TABLET ORAL at 21:45

## 2021-04-28 RX ADMIN — SODIUM CHLORIDE, POTASSIUM CHLORIDE, SODIUM LACTATE AND CALCIUM CHLORIDE 125 ML/HR: 600; 310; 30; 20 INJECTION, SOLUTION INTRAVENOUS at 20:43

## 2021-04-28 RX ADMIN — PHENYLEPHRINE HYDROCHLORIDE 100 MCG: 10 INJECTION INTRAVENOUS at 12:57

## 2021-04-28 RX ADMIN — SODIUM CHLORIDE, POTASSIUM CHLORIDE, SODIUM LACTATE AND CALCIUM CHLORIDE 125 ML/HR: 600; 310; 30; 20 INJECTION, SOLUTION INTRAVENOUS at 11:15

## 2021-04-28 RX ADMIN — ONDANSETRON 4 MG: 2 INJECTION INTRAMUSCULAR; INTRAVENOUS at 05:37

## 2021-04-28 RX ADMIN — FENTANYL CITRATE 50 MCG: 50 INJECTION INTRAMUSCULAR; INTRAVENOUS at 07:21

## 2021-04-28 RX ADMIN — PROPOFOL 200 MG: 10 INJECTION, EMULSION INTRAVENOUS at 05:29

## 2021-04-28 RX ADMIN — FENTANYL CITRATE 50 MCG: 50 INJECTION, SOLUTION INTRAMUSCULAR; INTRAVENOUS at 15:10

## 2021-04-28 RX ADMIN — HYDROMORPHONE HYDROCHLORIDE 0.5 MG: 1 INJECTION, SOLUTION INTRAMUSCULAR; INTRAVENOUS; SUBCUTANEOUS at 07:33

## 2021-04-28 RX ADMIN — DEXAMETHASONE SODIUM PHOSPHATE 8 MG: 10 INJECTION INTRAMUSCULAR; INTRAVENOUS at 05:37

## 2021-04-28 RX ADMIN — PHENYLEPHRINE HYDROCHLORIDE 150 MCG: 10 INJECTION INTRAVENOUS at 12:46

## 2021-04-28 RX ADMIN — OXYTOCIN 999 ML/HR: 10 INJECTION INTRAVENOUS at 04:50

## 2021-04-28 RX ADMIN — PROPOFOL 180 MG: 10 INJECTION, EMULSION INTRAVENOUS at 12:32

## 2021-04-28 RX ADMIN — OXYCODONE HYDROCHLORIDE AND ACETAMINOPHEN 2 TABLET: 5; 325 TABLET ORAL at 10:34

## 2021-04-28 RX ADMIN — ONDANSETRON 4 MG: 2 INJECTION INTRAMUSCULAR; INTRAVENOUS at 15:55

## 2021-04-28 RX ADMIN — ROCURONIUM BROMIDE 20 MG: 50 INJECTION INTRAVENOUS at 12:37

## 2021-04-28 RX ADMIN — IBUPROFEN 600 MG: 600 TABLET ORAL at 19:34

## 2021-04-28 RX ADMIN — SUCCINYLCHOLINE CHLORIDE 120 MG: 20 INJECTION, SOLUTION INTRAMUSCULAR; INTRAVENOUS; PARENTERAL at 12:32

## 2021-04-28 RX ADMIN — IOPAMIDOL 29.5 ML: 510 INJECTION, SOLUTION INTRAVASCULAR at 14:13

## 2021-04-28 RX ADMIN — OXYCODONE HYDROCHLORIDE AND ACETAMINOPHEN 1 TABLET: 7.5; 325 TABLET ORAL at 15:55

## 2021-04-28 RX ADMIN — ROCURONIUM BROMIDE 10 MG: 50 INJECTION INTRAVENOUS at 06:19

## 2021-04-28 RX ADMIN — ROCURONIUM BROMIDE 10 MG: 50 INJECTION INTRAVENOUS at 05:29

## 2021-04-28 RX ADMIN — LIDOCAINE HYDROCHLORIDE 80 MG: 20 INJECTION, SOLUTION INFILTRATION; PERINEURAL at 12:32

## 2021-04-28 RX ADMIN — FENTANYL CITRATE 50 MCG: 50 INJECTION INTRAMUSCULAR; INTRAVENOUS at 06:19

## 2021-04-28 RX ADMIN — ONDANSETRON 4 MG: 2 INJECTION INTRAMUSCULAR; INTRAVENOUS at 14:06

## 2021-04-28 RX ADMIN — NEOSTIGMINE METHYLSULFATE 2.5 MG: 0.5 INJECTION INTRAVENOUS at 06:52

## 2021-04-28 RX ADMIN — FENTANYL CITRATE 50 MCG: 50 INJECTION, SOLUTION INTRAMUSCULAR; INTRAVENOUS at 14:50

## 2021-04-28 RX ADMIN — IBUPROFEN 600 MG: 400 TABLET, FILM COATED ORAL at 01:33

## 2021-04-28 NOTE — ANESTHESIA PREPROCEDURE EVALUATION
Anesthesia Evaluation     Patient summary reviewed and Nursing notes reviewed   no history of anesthetic complications:  NPO Solid Status: > 2 hours             Airway   Mallampati: II  TM distance: >3 FB  Neck ROM: full  no difficulty expected  Dental - normal exam     Pulmonary - negative pulmonary ROS and normal exam   (-) COPD, asthma, not a smoker, lung cancer  Cardiovascular - normal exam  Exercise tolerance: good (4-7 METS)    Rhythm: regular  Rate: normal    (+) hypertension,   (-) valvular problems/murmurs, past MI, CAD, dysrhythmias, angina, CHF, orthopnea, cardiac stents, CABG, pericardial effusion      Neuro/Psych- negative ROS  (-) seizures, TIA, CVA  GI/Hepatic/Renal/Endo - negative ROS   (-) hiatal hernia, GERD, PUD, hepatitis, no renal disease, diabetes, GI bleed, no thyroid disorder    Musculoskeletal (-) negative ROS    Abdominal  - normal exam   Substance History - negative use     OB/GYN      Comment: 9d post partum now s/p emergency d/c this AM for PP bleeding.      Other   blood dyscrasia,       Other Comment: Pt known AT III deficiency.  Had been on prophylactic Lovenox qd until 20wk, intermediate bid Lovenox from 20-37wks and bid sq Heparin from 37 until delivery.                  Anesthesia Plan    ASA 2 - emergent     general     intravenous induction     Anesthetic plan, all risks, benefits, and alternatives have been provided, discussed and informed consent has been obtained with: patient, spouse/significant other and mother.    Plan discussed with CRNA.

## 2021-04-28 NOTE — ANESTHESIA POSTPROCEDURE EVALUATION
Patient: Carey Armando    Procedure Summary     Date: 04/28/21 Room / Location: [facility] OR 18 [facility] / [facility] HYBRID OR 18/19    Anesthesia Start: 1228 Anesthesia Stop: 1426    Procedure: PELVIC ARTERY EMBOLIZATION (N/A ) Diagnosis:     Surgeons: Ray La MD Provider: Giovanni Chapa MD    Anesthesia Type: general ASA Status: 2 - Emergent          Anesthesia Type: general    Vitals  Vitals Value Taken Time   /88 04/28/21 1550   Temp 36.7 °C (98.1 °F) 04/28/21 1425   Pulse 95 04/28/21 1606   Resp 16 04/28/21 1545   SpO2 97 % 04/28/21 1606   Vitals shown include unvalidated device data.        Post Anesthesia Care and Evaluation    Patient location during evaluation: bedside  Patient participation: complete - patient participated  Level of consciousness: awake and alert  Pain management: adequate  Airway patency: patent  Anesthetic complications: No anesthetic complications    Cardiovascular status: acceptable  Respiratory status: acceptable  Hydration status: acceptable    Comments: --------------------            04/28/21               1545     --------------------   BP:       130/80     Pulse:      98       Resp:       16       Temp:                SpO2:      96%      --------------------

## 2021-04-28 NOTE — ANESTHESIA PROCEDURE NOTES
Airway  Urgency: emergent    Date/Time: 4/28/2021 12:33 PM  Airway not difficult    General Information and Staff    Patient location during procedure: OR    Consent for Airway (if performed for an anesthetic, see related documentation for consents)  Patient identity confirmed: verbally with patient and arm band  Consent: No emergent situation. Written consent obtained.      Indications and Patient Condition  Indications for airway management: airway protection    Preoxygenated: yes  MILS maintained throughout  Mask difficulty assessment: 0 - not attempted (RSI)    Final Airway Details  Final airway type: endotracheal airway      Successful airway: ETT  Cuffed: yes   Successful intubation technique: direct laryngoscopy  Facilitating devices/methods: cricoid pressure and intubating stylet  Endotracheal tube insertion site: oral  Blade: Aliyah  Blade size: 3  ETT size (mm): 7.0  Cormack-Lehane Classification: grade I - full view of glottis  Placement verified by: capnometry   Cuff volume (mL): 8  Measured from: teeth  ETT/EBT  to teeth (cm): 21  Number of attempts at approach: 1  Assessment: lips, teeth, and gum same as pre-op and atraumatic intubation

## 2021-04-28 NOTE — ANESTHESIA PREPROCEDURE EVALUATION
Anesthesia Evaluation     Patient summary reviewed   no history of anesthetic complications:  NPO Solid Status: Waived due to emergency  NPO Liquid Status: Waived due to emergency           Airway   Mallampati: II  TM distance: >3 FB  Neck ROM: full  Dental - normal exam     Pulmonary - normal exam    breath sounds clear to auscultation  (-) shortness of breath, recent URI, not a smoker  Cardiovascular - normal exam    Rhythm: regular  Rate: normal    (-) past MI, dysrhythmias, angina      Neuro/Psych  (-) seizures, CVA  GI/Hepatic/Renal/Endo    (+)   liver disease (Cholestasis during pregnancy in third trimester),     Musculoskeletal (-) negative ROS    Abdominal    Substance History - negative use     OB/GYN    (+) Pregnant (PP from C/S 4/19/21),         Other   blood dyscrasia (Antithrombin 3 deficiency , Hgb 13.0),                       Anesthesia Plan    ASA 3 - emergent     general     intravenous induction     Anesthetic plan, all risks, benefits, and alternatives have been provided, discussed and informed consent has been obtained with: patient.    Plan discussed with CRNA.

## 2021-04-28 NOTE — ANESTHESIA POSTPROCEDURE EVALUATION
Patient: Carey Armando    Procedure Summary     Date: 04/28/21 Room / Location: [facility] OR 02 / [facility] MAIN OR    Anesthesia Start: 0528 Anesthesia Stop: 0713    Procedure: DILATATION AND CURETTAGE with suction (N/A Vagina) Diagnosis:     Surgeons: Julio César Leal MD Provider: Low Zayas MD    Anesthesia Type: general ASA Status: 3 - Emergent          Anesthesia Type: general    Vitals  Vitals Value Taken Time   /66 04/28/21 0805   Temp 36.7 °C (98.1 °F) 04/28/21 0704   Pulse 63 04/28/21 0819   Resp 16 04/28/21 0735   SpO2 100 % 04/28/21 0819   Vitals shown include unvalidated device data.        Post Anesthesia Care and Evaluation    Patient location during evaluation: bedside  Pain management: adequate  Airway patency: patent  Anesthetic complications: No anesthetic complications    Cardiovascular status: acceptable  Respiratory status: acceptable  Hydration status: acceptable

## 2021-04-28 NOTE — ANESTHESIA PROCEDURE NOTES
Airway  Urgency: elective    Date/Time: 4/28/2021 5:30 AM  Airway not difficult    General Information and Staff    Patient location during procedure: OR  CRNA: Nikolas Toussaint CRNA    Indications and Patient Condition  Indications for airway management: airway protection    Preoxygenated: yes  MILS maintained throughout  Mask difficulty assessment: 0 - not attempted    Final Airway Details  Final airway type: endotracheal airway      Successful airway: ETT  Cuffed: yes   Successful intubation technique: direct laryngoscopy  Endotracheal tube insertion site: oral  Blade: Aliyah  Blade size: 3  ETT size (mm): 7.0  Cormack-Lehane Classification: grade I - full view of glottis  Placement verified by: chest auscultation   Measured from: teeth  ETT/EBT  to teeth (cm): 22  Number of attempts at approach: 1  Assessment: lips, teeth, and gum same as pre-op and atraumatic intubation

## 2021-04-29 LAB
BASOPHILS # BLD AUTO: 0.05 10*3/MM3 (ref 0–0.2)
BASOPHILS NFR BLD AUTO: 0.5 % (ref 0–1.5)
BH BB BLOOD EXPIRATION DATE: NORMAL
BH BB BLOOD EXPIRATION DATE: NORMAL
BH BB BLOOD TYPE BARCODE: 9500
BH BB BLOOD TYPE BARCODE: 9500
BH BB DISPENSE STATUS: NORMAL
BH BB DISPENSE STATUS: NORMAL
BH BB PRODUCT CODE: NORMAL
BH BB PRODUCT CODE: NORMAL
BH BB UNIT NUMBER: NORMAL
BH BB UNIT NUMBER: NORMAL
CROSSMATCH INTERPRETATION: NORMAL
CROSSMATCH INTERPRETATION: NORMAL
DEPRECATED RDW RBC AUTO: 41 FL (ref 37–54)
DEPRECATED RDW RBC AUTO: 41.2 FL (ref 37–54)
EOSINOPHIL # BLD AUTO: 0.1 10*3/MM3 (ref 0–0.4)
EOSINOPHIL NFR BLD AUTO: 0.9 % (ref 0.3–6.2)
ERYTHROCYTE [DISTWIDTH] IN BLOOD BY AUTOMATED COUNT: 12.6 % (ref 12.3–15.4)
ERYTHROCYTE [DISTWIDTH] IN BLOOD BY AUTOMATED COUNT: 12.8 % (ref 12.3–15.4)
HCT VFR BLD AUTO: 26.6 % (ref 34–46.6)
HCT VFR BLD AUTO: 27.1 % (ref 34–46.6)
HGB BLD-MCNC: 9.2 G/DL (ref 12–15.9)
HGB BLD-MCNC: 9.4 G/DL (ref 12–15.9)
IMM GRANULOCYTES # BLD AUTO: 0.11 10*3/MM3 (ref 0–0.05)
IMM GRANULOCYTES NFR BLD AUTO: 1 % (ref 0–0.5)
LYMPHOCYTES # BLD AUTO: 4.16 10*3/MM3 (ref 0.7–3.1)
LYMPHOCYTES NFR BLD AUTO: 38.6 % (ref 19.6–45.3)
MCH RBC QN AUTO: 31.4 PG (ref 26.6–33)
MCH RBC QN AUTO: 31.4 PG (ref 26.6–33)
MCHC RBC AUTO-ENTMCNC: 34.6 G/DL (ref 31.5–35.7)
MCHC RBC AUTO-ENTMCNC: 34.7 G/DL (ref 31.5–35.7)
MCV RBC AUTO: 90.6 FL (ref 79–97)
MCV RBC AUTO: 90.8 FL (ref 79–97)
MONOCYTES # BLD AUTO: 0.84 10*3/MM3 (ref 0.1–0.9)
MONOCYTES NFR BLD AUTO: 7.8 % (ref 5–12)
NEUTROPHILS NFR BLD AUTO: 5.51 10*3/MM3 (ref 1.7–7)
NEUTROPHILS NFR BLD AUTO: 51.2 % (ref 42.7–76)
NRBC BLD AUTO-RTO: 0 /100 WBC (ref 0–0.2)
PLATELET # BLD AUTO: 251 10*3/MM3 (ref 140–450)
PLATELET # BLD AUTO: 271 10*3/MM3 (ref 140–450)
PMV BLD AUTO: 9.7 FL (ref 6–12)
PMV BLD AUTO: 9.7 FL (ref 6–12)
RBC # BLD AUTO: 2.93 10*6/MM3 (ref 3.77–5.28)
RBC # BLD AUTO: 2.99 10*6/MM3 (ref 3.77–5.28)
UNIT  ABO: NORMAL
UNIT  ABO: NORMAL
UNIT  RH: NORMAL
UNIT  RH: NORMAL
WBC # BLD AUTO: 10.77 10*3/MM3 (ref 3.4–10.8)
WBC # BLD AUTO: 12.18 10*3/MM3 (ref 3.4–10.8)

## 2021-04-29 PROCEDURE — 88305 TISSUE EXAM BY PATHOLOGIST: CPT | Performed by: OBSTETRICS & GYNECOLOGY

## 2021-04-29 PROCEDURE — 36430 TRANSFUSION BLD/BLD COMPNT: CPT

## 2021-04-29 PROCEDURE — 85027 COMPLETE CBC AUTOMATED: CPT | Performed by: OBSTETRICS & GYNECOLOGY

## 2021-04-29 PROCEDURE — 86900 BLOOD TYPING SEROLOGIC ABO: CPT

## 2021-04-29 PROCEDURE — 99024 POSTOP FOLLOW-UP VISIT: CPT | Performed by: OBSTETRICS & GYNECOLOGY

## 2021-04-29 PROCEDURE — P9016 RBC LEUKOCYTES REDUCED: HCPCS

## 2021-04-29 PROCEDURE — 85025 COMPLETE CBC W/AUTO DIFF WBC: CPT | Performed by: OBSTETRICS & GYNECOLOGY

## 2021-04-29 RX ORDER — METHYLERGONOVINE MALEATE 0.2 MG/1
200 TABLET ORAL EVERY 6 HOURS SCHEDULED
Status: DISCONTINUED | OUTPATIENT
Start: 2021-04-29 | End: 2021-04-29

## 2021-04-29 RX ORDER — ACETAMINOPHEN 500 MG
1000 TABLET ORAL EVERY 8 HOURS PRN
Status: DISCONTINUED | OUTPATIENT
Start: 2021-04-29 | End: 2021-05-01 | Stop reason: HOSPADM

## 2021-04-29 RX ORDER — METHYLERGONOVINE MALEATE 0.2 MG/1
200 TABLET ORAL EVERY 6 HOURS SCHEDULED
Status: COMPLETED | OUTPATIENT
Start: 2021-04-29 | End: 2021-04-30

## 2021-04-29 RX ADMIN — SODIUM CHLORIDE, POTASSIUM CHLORIDE, SODIUM LACTATE AND CALCIUM CHLORIDE 999 ML/HR: 600; 310; 30; 20 INJECTION, SOLUTION INTRAVENOUS at 00:50

## 2021-04-29 RX ADMIN — IBUPROFEN 600 MG: 600 TABLET ORAL at 02:51

## 2021-04-29 RX ADMIN — OXYCODONE HYDROCHLORIDE AND ACETAMINOPHEN 1 TABLET: 5; 325 TABLET ORAL at 15:32

## 2021-04-29 RX ADMIN — SODIUM CHLORIDE, POTASSIUM CHLORIDE, SODIUM LACTATE AND CALCIUM CHLORIDE 125 ML/HR: 600; 310; 30; 20 INJECTION, SOLUTION INTRAVENOUS at 01:59

## 2021-04-29 RX ADMIN — ACETAMINOPHEN 1000 MG: 500 TABLET, FILM COATED ORAL at 21:02

## 2021-04-29 RX ADMIN — METHYLERGONOVINE 200 MCG: 0.2 TABLET ORAL at 09:27

## 2021-04-29 RX ADMIN — SODIUM CHLORIDE, PRESERVATIVE FREE 10 ML: 5 INJECTION INTRAVENOUS at 10:40

## 2021-04-29 RX ADMIN — METHYLERGONOVINE 200 MCG: 0.2 TABLET ORAL at 15:11

## 2021-04-29 RX ADMIN — DOCUSATE SODIUM 100 MG: 100 CAPSULE, LIQUID FILLED ORAL at 09:13

## 2021-04-29 RX ADMIN — METHYLERGONOVINE 200 MCG: 0.2 TABLET ORAL at 02:49

## 2021-04-29 RX ADMIN — METHYLERGONOVINE 200 MCG: 0.2 TABLET ORAL at 21:02

## 2021-04-29 RX ADMIN — OXYCODONE HYDROCHLORIDE AND ACETAMINOPHEN 1 TABLET: 5; 325 TABLET ORAL at 10:58

## 2021-04-29 RX ADMIN — DOCUSATE SODIUM 100 MG: 100 CAPSULE, LIQUID FILLED ORAL at 21:02

## 2021-04-30 ENCOUNTER — APPOINTMENT (OUTPATIENT)
Dept: CARDIOLOGY | Facility: HOSPITAL | Age: 36
End: 2021-04-30

## 2021-04-30 LAB
BASOPHILS # BLD AUTO: 0.05 10*3/MM3 (ref 0–0.2)
BASOPHILS # BLD AUTO: 0.05 10*3/MM3 (ref 0–0.2)
BASOPHILS NFR BLD AUTO: 0.5 % (ref 0–1.5)
BASOPHILS NFR BLD AUTO: 0.5 % (ref 0–1.5)
BH CV LOWER VASCULAR LEFT COMMON FEMORAL AUGMENT: NORMAL
BH CV LOWER VASCULAR LEFT COMMON FEMORAL COMPETENT: NORMAL
BH CV LOWER VASCULAR LEFT COMMON FEMORAL COMPRESS: NORMAL
BH CV LOWER VASCULAR LEFT COMMON FEMORAL PHASIC: NORMAL
BH CV LOWER VASCULAR LEFT COMMON FEMORAL SPONT: NORMAL
BH CV LOWER VASCULAR LEFT DISTAL FEMORAL COMPRESS: NORMAL
BH CV LOWER VASCULAR LEFT GASTRONEMIUS COMPRESS: NORMAL
BH CV LOWER VASCULAR LEFT GREATER SAPH AK COMPRESS: NORMAL
BH CV LOWER VASCULAR LEFT GREATER SAPH BK COMPRESS: NORMAL
BH CV LOWER VASCULAR LEFT LESSER SAPH COMPRESS: NORMAL
BH CV LOWER VASCULAR LEFT MID FEMORAL AUGMENT: NORMAL
BH CV LOWER VASCULAR LEFT MID FEMORAL COMPETENT: NORMAL
BH CV LOWER VASCULAR LEFT MID FEMORAL COMPRESS: NORMAL
BH CV LOWER VASCULAR LEFT MID FEMORAL PHASIC: NORMAL
BH CV LOWER VASCULAR LEFT MID FEMORAL SPONT: NORMAL
BH CV LOWER VASCULAR LEFT PERONEAL COMPRESS: NORMAL
BH CV LOWER VASCULAR LEFT POPLITEAL AUGMENT: NORMAL
BH CV LOWER VASCULAR LEFT POPLITEAL COMPETENT: NORMAL
BH CV LOWER VASCULAR LEFT POPLITEAL COMPRESS: NORMAL
BH CV LOWER VASCULAR LEFT POPLITEAL PHASIC: NORMAL
BH CV LOWER VASCULAR LEFT POPLITEAL SPONT: NORMAL
BH CV LOWER VASCULAR LEFT POSTERIOR TIBIAL COMPRESS: NORMAL
BH CV LOWER VASCULAR LEFT PROFUNDA FEMORAL COMPRESS: NORMAL
BH CV LOWER VASCULAR LEFT PROXIMAL FEMORAL COMPRESS: NORMAL
BH CV LOWER VASCULAR LEFT SAPHENOFEMORAL JUNCTION COMPRESS: NORMAL
BH CV LOWER VASCULAR RIGHT COMMON FEMORAL AUGMENT: NORMAL
BH CV LOWER VASCULAR RIGHT COMMON FEMORAL COMPETENT: NORMAL
BH CV LOWER VASCULAR RIGHT COMMON FEMORAL COMPRESS: NORMAL
BH CV LOWER VASCULAR RIGHT COMMON FEMORAL PHASIC: NORMAL
BH CV LOWER VASCULAR RIGHT COMMON FEMORAL SPONT: NORMAL
BH CV LOWER VASCULAR RIGHT DISTAL FEMORAL COMPRESS: NORMAL
BH CV LOWER VASCULAR RIGHT GASTRONEMIUS COMPRESS: NORMAL
BH CV LOWER VASCULAR RIGHT GREATER SAPH AK COMPRESS: NORMAL
BH CV LOWER VASCULAR RIGHT GREATER SAPH BK COMPRESS: NORMAL
BH CV LOWER VASCULAR RIGHT LESSER SAPH COMPRESS: NORMAL
BH CV LOWER VASCULAR RIGHT MID FEMORAL AUGMENT: NORMAL
BH CV LOWER VASCULAR RIGHT MID FEMORAL COMPETENT: NORMAL
BH CV LOWER VASCULAR RIGHT MID FEMORAL COMPRESS: NORMAL
BH CV LOWER VASCULAR RIGHT MID FEMORAL PHASIC: NORMAL
BH CV LOWER VASCULAR RIGHT MID FEMORAL SPONT: NORMAL
BH CV LOWER VASCULAR RIGHT PERONEAL COMPRESS: NORMAL
BH CV LOWER VASCULAR RIGHT POPLITEAL AUGMENT: NORMAL
BH CV LOWER VASCULAR RIGHT POPLITEAL COMPETENT: NORMAL
BH CV LOWER VASCULAR RIGHT POPLITEAL COMPRESS: NORMAL
BH CV LOWER VASCULAR RIGHT POPLITEAL PHASIC: NORMAL
BH CV LOWER VASCULAR RIGHT POPLITEAL SPONT: NORMAL
BH CV LOWER VASCULAR RIGHT POSTERIOR TIBIAL COMPRESS: NORMAL
BH CV LOWER VASCULAR RIGHT PROFUNDA FEMORAL COMPRESS: NORMAL
BH CV LOWER VASCULAR RIGHT PROXIMAL FEMORAL COMPRESS: NORMAL
BH CV LOWER VASCULAR RIGHT SAPHENOFEMORAL JUNCTION COMPRESS: NORMAL
DEPRECATED RDW RBC AUTO: 41.7 FL (ref 37–54)
DEPRECATED RDW RBC AUTO: 42.6 FL (ref 37–54)
EOSINOPHIL # BLD AUTO: 0.19 10*3/MM3 (ref 0–0.4)
EOSINOPHIL # BLD AUTO: 0.25 10*3/MM3 (ref 0–0.4)
EOSINOPHIL NFR BLD AUTO: 1.8 % (ref 0.3–6.2)
EOSINOPHIL NFR BLD AUTO: 2.4 % (ref 0.3–6.2)
ERYTHROCYTE [DISTWIDTH] IN BLOOD BY AUTOMATED COUNT: 12.8 % (ref 12.3–15.4)
ERYTHROCYTE [DISTWIDTH] IN BLOOD BY AUTOMATED COUNT: 13 % (ref 12.3–15.4)
HCT VFR BLD AUTO: 25.3 % (ref 34–46.6)
HCT VFR BLD AUTO: 26.5 % (ref 34–46.6)
HGB BLD-MCNC: 8.6 G/DL (ref 12–15.9)
HGB BLD-MCNC: 9.1 G/DL (ref 12–15.9)
IMM GRANULOCYTES # BLD AUTO: 0.1 10*3/MM3 (ref 0–0.05)
IMM GRANULOCYTES # BLD AUTO: 0.1 10*3/MM3 (ref 0–0.05)
IMM GRANULOCYTES NFR BLD AUTO: 0.9 % (ref 0–0.5)
IMM GRANULOCYTES NFR BLD AUTO: 0.9 % (ref 0–0.5)
LYMPHOCYTES # BLD AUTO: 3.35 10*3/MM3 (ref 0.7–3.1)
LYMPHOCYTES # BLD AUTO: 3.55 10*3/MM3 (ref 0.7–3.1)
LYMPHOCYTES NFR BLD AUTO: 31.5 % (ref 19.6–45.3)
LYMPHOCYTES NFR BLD AUTO: 33.5 % (ref 19.6–45.3)
MCH RBC QN AUTO: 30.9 PG (ref 26.6–33)
MCH RBC QN AUTO: 31.2 PG (ref 26.6–33)
MCHC RBC AUTO-ENTMCNC: 34 G/DL (ref 31.5–35.7)
MCHC RBC AUTO-ENTMCNC: 34.3 G/DL (ref 31.5–35.7)
MCV RBC AUTO: 90.8 FL (ref 79–97)
MCV RBC AUTO: 91 FL (ref 79–97)
MONOCYTES # BLD AUTO: 0.63 10*3/MM3 (ref 0.1–0.9)
MONOCYTES # BLD AUTO: 0.78 10*3/MM3 (ref 0.1–0.9)
MONOCYTES NFR BLD AUTO: 5.9 % (ref 5–12)
MONOCYTES NFR BLD AUTO: 7.3 % (ref 5–12)
NEUTROPHILS NFR BLD AUTO: 56.8 % (ref 42.7–76)
NEUTROPHILS NFR BLD AUTO: 58 % (ref 42.7–76)
NEUTROPHILS NFR BLD AUTO: 6.03 10*3/MM3 (ref 1.7–7)
NEUTROPHILS NFR BLD AUTO: 6.17 10*3/MM3 (ref 1.7–7)
NRBC BLD AUTO-RTO: 0 /100 WBC (ref 0–0.2)
NRBC BLD AUTO-RTO: 0 /100 WBC (ref 0–0.2)
PLATELET # BLD AUTO: 246 10*3/MM3 (ref 140–450)
PLATELET # BLD AUTO: 304 10*3/MM3 (ref 140–450)
PMV BLD AUTO: 9.6 FL (ref 6–12)
PMV BLD AUTO: 9.7 FL (ref 6–12)
RBC # BLD AUTO: 2.78 10*6/MM3 (ref 3.77–5.28)
RBC # BLD AUTO: 2.92 10*6/MM3 (ref 3.77–5.28)
WBC # BLD AUTO: 10.61 10*3/MM3 (ref 3.4–10.8)
WBC # BLD AUTO: 10.64 10*3/MM3 (ref 3.4–10.8)

## 2021-04-30 PROCEDURE — 93970 EXTREMITY STUDY: CPT

## 2021-04-30 PROCEDURE — 85025 COMPLETE CBC W/AUTO DIFF WBC: CPT | Performed by: OBSTETRICS & GYNECOLOGY

## 2021-04-30 RX ORDER — PRENATAL VIT/IRON FUM/FOLIC AC 27MG-0.8MG
1 TABLET ORAL DAILY
Status: DISCONTINUED | OUTPATIENT
Start: 2021-04-30 | End: 2021-05-01 | Stop reason: HOSPADM

## 2021-04-30 RX ORDER — FERROUS SULFATE 325(65) MG
325 TABLET ORAL 2 TIMES DAILY WITH MEALS
Status: DISCONTINUED | OUTPATIENT
Start: 2021-04-30 | End: 2021-05-01 | Stop reason: HOSPADM

## 2021-04-30 RX ADMIN — METHYLERGONOVINE 200 MCG: 0.2 TABLET ORAL at 09:15

## 2021-04-30 RX ADMIN — METHYLERGONOVINE 200 MCG: 0.2 TABLET ORAL at 20:50

## 2021-04-30 RX ADMIN — FERROUS SULFATE TAB 325 MG (65 MG ELEMENTAL FE) 325 MG: 325 (65 FE) TAB at 17:18

## 2021-04-30 RX ADMIN — ACETAMINOPHEN 1000 MG: 500 TABLET, FILM COATED ORAL at 18:34

## 2021-04-30 RX ADMIN — Medication 1 TABLET: at 15:11

## 2021-04-30 RX ADMIN — DOCUSATE SODIUM 100 MG: 100 CAPSULE, LIQUID FILLED ORAL at 09:15

## 2021-04-30 RX ADMIN — ACETAMINOPHEN 1000 MG: 500 TABLET, FILM COATED ORAL at 09:15

## 2021-04-30 RX ADMIN — METHYLERGONOVINE 200 MCG: 0.2 TABLET ORAL at 15:11

## 2021-04-30 RX ADMIN — METHYLERGONOVINE 200 MCG: 0.2 TABLET ORAL at 03:06

## 2021-04-30 RX ADMIN — DOCUSATE SODIUM 100 MG: 100 CAPSULE, LIQUID FILLED ORAL at 20:50

## 2021-05-01 ENCOUNTER — HOSPITAL ENCOUNTER (OUTPATIENT)
Facility: HOSPITAL | Age: 36
End: 2021-05-01
Attending: OBSTETRICS & GYNECOLOGY | Admitting: OBSTETRICS & GYNECOLOGY

## 2021-05-01 VITALS
TEMPERATURE: 97.9 F | HEART RATE: 96 BPM | SYSTOLIC BLOOD PRESSURE: 118 MMHG | HEIGHT: 67 IN | RESPIRATION RATE: 18 BRPM | BODY MASS INDEX: 28.44 KG/M2 | DIASTOLIC BLOOD PRESSURE: 73 MMHG | OXYGEN SATURATION: 97 %

## 2021-05-01 LAB
BASOPHILS # BLD AUTO: 0.08 10*3/MM3 (ref 0–0.2)
BASOPHILS NFR BLD AUTO: 0.7 % (ref 0–1.5)
BH BB BLOOD EXPIRATION DATE: NORMAL
BH BB BLOOD EXPIRATION DATE: NORMAL
BH BB BLOOD TYPE BARCODE: 9500
BH BB BLOOD TYPE BARCODE: 9500
BH BB DISPENSE STATUS: NORMAL
BH BB DISPENSE STATUS: NORMAL
BH BB PRODUCT CODE: NORMAL
BH BB PRODUCT CODE: NORMAL
BH BB UNIT NUMBER: NORMAL
BH BB UNIT NUMBER: NORMAL
CROSSMATCH INTERPRETATION: NORMAL
CROSSMATCH INTERPRETATION: NORMAL
DEPRECATED RDW RBC AUTO: 43.8 FL (ref 37–54)
EOSINOPHIL # BLD AUTO: 0.37 10*3/MM3 (ref 0–0.4)
EOSINOPHIL NFR BLD AUTO: 3.3 % (ref 0.3–6.2)
ERYTHROCYTE [DISTWIDTH] IN BLOOD BY AUTOMATED COUNT: 12.9 % (ref 12.3–15.4)
HCT VFR BLD AUTO: 28.2 % (ref 34–46.6)
HGB BLD-MCNC: 9.3 G/DL (ref 12–15.9)
IMM GRANULOCYTES # BLD AUTO: 0.13 10*3/MM3 (ref 0–0.05)
IMM GRANULOCYTES NFR BLD AUTO: 1.1 % (ref 0–0.5)
LYMPHOCYTES # BLD AUTO: 3.06 10*3/MM3 (ref 0.7–3.1)
LYMPHOCYTES NFR BLD AUTO: 26.9 % (ref 19.6–45.3)
MCH RBC QN AUTO: 30.9 PG (ref 26.6–33)
MCHC RBC AUTO-ENTMCNC: 33 G/DL (ref 31.5–35.7)
MCV RBC AUTO: 93.7 FL (ref 79–97)
MONOCYTES # BLD AUTO: 0.56 10*3/MM3 (ref 0.1–0.9)
MONOCYTES NFR BLD AUTO: 4.9 % (ref 5–12)
NEUTROPHILS NFR BLD AUTO: 63.1 % (ref 42.7–76)
NEUTROPHILS NFR BLD AUTO: 7.16 10*3/MM3 (ref 1.7–7)
NRBC BLD AUTO-RTO: 0 /100 WBC (ref 0–0.2)
PLATELET # BLD AUTO: 323 10*3/MM3 (ref 140–450)
PMV BLD AUTO: 9.5 FL (ref 6–12)
RBC # BLD AUTO: 3.01 10*6/MM3 (ref 3.77–5.28)
UNIT  ABO: NORMAL
UNIT  ABO: NORMAL
UNIT  RH: NORMAL
UNIT  RH: NORMAL
WBC # BLD AUTO: 11.36 10*3/MM3 (ref 3.4–10.8)

## 2021-05-01 PROCEDURE — 99024 POSTOP FOLLOW-UP VISIT: CPT | Performed by: OBSTETRICS & GYNECOLOGY

## 2021-05-01 PROCEDURE — 85025 COMPLETE CBC W/AUTO DIFF WBC: CPT | Performed by: OBSTETRICS & GYNECOLOGY

## 2021-05-01 RX ADMIN — ACETAMINOPHEN 1000 MG: 500 TABLET, FILM COATED ORAL at 08:24

## 2021-05-01 RX ADMIN — DOCUSATE SODIUM 100 MG: 100 CAPSULE, LIQUID FILLED ORAL at 08:24

## 2021-05-01 RX ADMIN — Medication 1 TABLET: at 08:24

## 2021-05-01 RX ADMIN — FERROUS SULFATE TAB 325 MG (65 MG ELEMENTAL FE) 325 MG: 325 (65 FE) TAB at 08:24

## 2021-05-01 NOTE — DISCHARGE SUMMARY
Post-Op Day 12 S/P      Subjective         Patient reports:  Pain is well controlled.  She is ambulating. Tolerating diet. Tolerating po -- normal.  She denies any active bleeding since she passed clot yesterday. She notes only scant bloody discharge. She denies any significant abdominal pain. She reports she has had an occasional episode of left calf pain when she walks only. She denies any swelling.      ROS:  Constitutional: positive for mild fatigue  Neuro: negative for headache or lightheadedness  Pulm: negative for shortness of air  : negative for active bleeding  Musculoskeletal: Positive for occasional left calf discomfort with ambulation              Objective          Vitals: Vital Signs Range for the last 24 hours  Temperature: Temp:  [98 °F (36.7 °C)-98.5 °F (36.9 °C)] 98 °F (36.7 °C)   Temp Source: Temp src: Oral   BP: BP: (104-114)/(66-78) 114/78   Pulse: Heart Rate:  [80-85] 85   Respirations: Resp:  [14-16] 16   SPO2:    O2 Amount (l/min):    O2 Devices Device (Oxygen Therapy): room air   Weight:               Physical Exam     Lungs normal respiratory effort   Abdomen Soft, no guarding/rebound   Incision  no drainage, no erythema, no swelling, well approximated   Extremities Bilateral lower extremities examined and without significant edema, cords or tenderness; specifically examined area of pain and no focal abnormalities noted      Labs:         Lab Results   Component Value Date     WBC 10.64 2021     HGB 8.6 (L) 2021     HCT 25.3 (L) 2021     MCV 91.0 2021      2021               Assessment/Plan           Postoperative vaginal bleeding following genitourinary procedure    Fibroid    Antithrombin 3 deficiency (CMS/HCC)     delivery delivered    Vagina bleeding    Postpartum hemorrhage, delayed (> 24 hrs)        Assessment:    Carey Armando is Day 12post-partum  , Low Transverse   with re-admission for vaginal bleeding; pt had suction  D&C with uterine balloon then uterine artery embolization she has received 2 U of PRBC's. Patient passed a round mass vaginally yesterday that grossly appeared to be a fibroid. Path is pending. Her hgb fell slightly today however pt clinically feels better and has no active bleeding.  Hemoglobin of 9 with hematocrit 26, it is being rechecked this morning.  Patient continues to feel well and is requesting discharge.  Because of bleeding issues, Lovenox will not be prescribed for the remainder of her postpartum course, patient did not use Lovenox and her last delivery and did fine.  Encouraged ambulation and discussed with patient and  the need to let us know if she has any calf pain or change in her medical condition or recurrence of any bleeding.  We will also try to avoid nonsteroidals, patient has some Percocet left at home and those will not be represcribed..  Again patient to call us with any questions or change in her condition

## 2021-05-03 ENCOUNTER — TELEPHONE (OUTPATIENT)
Dept: OBSTETRICS AND GYNECOLOGY | Age: 36
End: 2021-05-03

## 2021-05-03 LAB
LAB AP CASE REPORT: NORMAL
LAB AP DIAGNOSIS COMMENT: NORMAL
PATH REPORT.ADDENDUM SPEC: NORMAL
PATH REPORT.FINAL DX SPEC: NORMAL
PATH REPORT.GROSS SPEC: NORMAL

## 2021-05-03 NOTE — PAYOR COMM NOTE
"Carey Vallejo (36 y.o. Female)      ATTN: NURSE REVIEWER  RE: UPDATED CLINICALS FOR INPATIENT  REF#[id number]  PLS REPLY TO [facility] [phone] FAX# [phone]      Date of Birth Social Security Number Address Home Phone MRN    [id number]  [id number] [place] [address] [phone] [id number]    [employer] Marital Status          Non-Zoroastrianism        Admission Date Admission Type Admitting Provider Attending Provider Department, Room/Bed    4/27/21 Emergency Julio César Leal MD  [facility] 3 [facility], E363/1    Discharge Date Discharge Disposition Discharge Destination        5/1/2021 Home or Self Care              Attending Provider: (none)   Allergies: Ceclor [Cefaclor]    Isolation: None   Infection: None   Code Status: Prior    Ht: 170.2 cm (67\")   Wt: 82.4 kg (181 lb 9.6 oz)    Admission Cmt: None   Principal Problem: Postoperative vaginal bleeding following genitourinary procedure [N99.820]                 Active Insurance as of 4/27/2021     Primary Coverage     Payor Plan Insurance Group Employer/Plan Group    [employer] BLUE [employer]OSS [employer] BLUE CROSS BLUE [employer]IELD PPO [id number]     Payor Plan Address Payor Plan Phone Number Payor Plan Fax Number Effective Dates    [address] [phone]  1/1/2019 - None Entered    [facility] [id number]       Subscriber Name Subscriber Birth Date Member ID       VASILE VALLEJO 4/10/1986 [id number]                 Emergency Contacts      (Rel.) Home Phone Work Phone Mobile Phone    Vasile Vallejo (Spouse) [phone] -- [phone]    Trisha Ring (Mother) [phone] -- [phone]            Vital Signs (last 2 days) before discharge     Date/Time   Temp   Temp src   Pulse   Resp   BP   Patient Position   SpO2    05/01/21 0844   97.9 (36.6)   Oral   96   18   118/73   Sitting   --    05/01/21 0345   --   --   --   --   --   --   --    BP: Pt refused BP at 05/01/21 0345    05/01/21 0000   98.3 (36.8)   Oral   69   16   " 115/80   Lying   --    04/30/21 2050   98 (36.7)   Oral   80   16   117/80   Sitting   --    04/30/21 1738   98 (36.7)   Oral   85   16   114/78   Sitting   --    04/30/21 0838   98.1 (36.7)   Oral   85   16   105/70   Sitting   --    04/30/21 0310   98.5 (36.9)   Oral   80   14   104/69   Lying   --    04/30/21 0156   --   --   --   --   --   --   --    Comment rows:    OBSERV: RN at bedside explaing transfer to postpartum. Patient has complaints of IV in her right hand very uncomfortable and would like it removed. RN explained importance of keeping it in and that if anything should happen with her bleeding increasing, she would have to get a 2nd IV replaced. Patient verbalized understanding.  at 04/30/21 0156    04/29/21 1933   98.2 (36.8)   Oral   81   16   108/66   --   --    04/29/21 1533   98 (36.7)   Oral   82   18   104/54   --   --    04/29/21 1400   --   --   76   --   --   --   97    04/29/21 1300   --   --   82   18   100/61   --   99    04/29/21 1210   --   --   86   --   --   --   99    04/29/21 1205   --   --   91   --   --   --   98    04/29/21 1202   --   --   79   18   105/59   --   --    04/29/21 1155   --   --   --   --   --   --   --    Comment rows:    OBSERV: pt asssited up to bathroom then transferring to /b, large clot noted in potty hat, removed from hat , large clot with area of tissue also noted, weighed at 192 at 04/29/21 1155    04/29/21 1038   --   --   79   --   105/59   --   --    04/29/21 0831   98.5 (36.9)   Oral   86   16   106/58   --   --    04/29/21 0815   --   --   82   --   --   --   97    04/29/21 0810   --   --   83   --   --   --   97    04/29/21 0715   --   --   71   --   --   --   97    Comment rows:    OBSERV: pt resting with eyes closed, will allow pt to rest for now, pt  at [facility] and agrees with allowing pt to sleep as she as had little sleep last night at 04/29/21 0715    04/29/21 0700   --   --   77   --   --   --   97    04/29/21 0645   --   --   71   --   --    --   97    04/29/21 0630   --   --   69   --   --   --   97    04/29/21 0620   --   --   70   --   --   --   97    04/29/21 0615   --   --   72   --   --   --   96    04/29/21 0610   --   --   74   --   --   --   96    04/29/21 0605   --   --   69   --   --   --   97    04/29/21 0600   --   --   69   --   --   --   98    04/29/21 0550   --   --   71   --   --   --   98    04/29/21 0535   --   --   78   --   --   --   98    04/29/21 0530   --   --   66   --   --   --   96    04/29/21 0525   --   --   80   --   --   --   97    04/29/21 0520   --   --   67   --   --   --   97    04/29/21 0510   --   --   64   --   --   --   98    04/29/21 0505   --   --   73   --   --   --   98    04/29/21 0500   --   --   69   --   --   --   98    04/29/21 0455   --   --   71   --   --   --   97    04/29/21 0451   --   --   70   --   105/61   --   --    04/29/21 0450   --   --   79   --   --   --   97    04/29/21 0445   98.3 (36.8)   Oral   81   16   --   --   98    04/29/21 0440   --   --   75   --   --   --   98    04/29/21 0430   --   --   71   --   --   --   98    04/29/21 0420   --   --   72   --   --   --   98    04/29/21 0415   --   --   77   --   --   --   97    04/29/21 0410   --   --   82   --   --   --   96    04/29/21 0405   --   --   70   --   --   --   97    04/29/21 0400   --   --   70   --   --   --   98    04/29/21 0355   --   --   67   --   --   --   99    04/29/21 0350   --   --   66   --   --   --   99    04/29/21 0345   --   --   72   --   --   --   97    04/29/21 0340   --   --   71   --   --   --   98    04/29/21 0335   --   --   68   --   --   --   98    04/29/21 0330   --   --   66   --   --   --   99    04/29/21 0325   --   --   66   --   --   --   98    04/29/21 0320   --   --   66   --   --   --   99    04/29/21 0315   --   --   67   --   --   --   99    04/29/21 0310   --   --   64   --   --   --   99    04/29/21 0305   --   --   66   --   --   --   98    04/29/21 0300   --   --   67   --   --   --   100     04/29/21 0255   --   --   65   --   --   --   99    04/29/21 0220   --   --   72   --   --   --   98    04/29/21 0218   --   --   72   --   107/63   --   --    04/29/21 0216   98.2 (36.8)   Oral   71   16   --   --   98    04/29/21 0215   --   --   71   --   --   --   98    04/29/21 0210   --   --   76   --   --   --   99    04/29/21 0205   --   --   73   --   --   --   98    04/29/21 0200   --   --   70   --   --   --   99    04/29/21 0155   --   --   73   --   --   --   98    04/29/21 0153   98.3 (36.8)   --   78   16   105/62   --   99    04/29/21 0150   --   --   72   --   --   --   99    04/29/21 0130   --   --   70   --   --   --   100    04/29/21 0125   --   --   69   --   --   --   99    04/29/21 0121   --   --   69   --   109/69   --   --    04/29/21 0120   --   --   69   --   --   --   100    04/29/21 0115   98.3 (36.8)   Oral   70   16   --   --   98    04/29/21 0110   --   --   77   --   --   --   99    04/29/21 0105   --   --   75   --   --   --   99    04/29/21 0100   --   --   71   --   --   --   99    04/29/21 0045   --   --   68   --   --   --   99    04/29/21 0040   --   --   68   --   --   --   99    04/29/21 0035   --   --   71   --   --   --   99            Lines, Drains & Airways    Active LDAs     None                  No current facility-administered medications for this encounter.     Current Outpatient Medications   Medication Sig Dispense Refill   • docusate sodium (COLACE) 100 MG capsule Take 100 mg by mouth 2 (Two) Times a Day.     • Prenatal Vit-Fe Fumarate-FA (prenatal vitamin 27-0.8) 27-0.8 MG tablet tablet Take  by mouth Daily.       Blood Administration Record (From admission, onward)    Completed transfusions     Ordered     Start    04/29/21 0135  Transfuse RBC Infuse Each Unit Over: 3.5H  Transfusion     Released Time Blood Unit Number Status   04/29/21 0135   21  [id number]  [id number] Completed 04/29/21 0454       04/29/21 0132                Lab Results (last 48 hours)      Procedure Component Value Units Date/Time    CBC & Differential [[id number]]  (Abnormal) Collected: 05/01/21 0929    Specimen: Blood Updated: 05/01/21 0946    Narrative:      The following orders were created for panel order CBC & Differential.  Procedure                               Abnormality         Status                     ---------                               -----------         ------                     CBC Auto Differential[[id number]]        Abnormal            Final result                 Please view results for these tests on the individual orders.    CBC Auto Differential [[id number]]  (Abnormal) Collected: 05/01/21 0929    Specimen: Blood Updated: 05/01/21 0946     WBC 11.36 10*3/mm3      RBC 3.01 10*6/mm3      Hemoglobin 9.3 g/dL      Hematocrit 28.2 %      MCV 93.7 fL      MCH 30.9 pg      MCHC 33.0 g/dL      RDW 12.9 %      RDW-SD 43.8 fl      MPV 9.5 fL      Platelets 323 10*3/mm3      Neutrophil % 63.1 %      Lymphocyte % 26.9 %      Monocyte % 4.9 %      Eosinophil % 3.3 %      Basophil % 0.7 %      Immature Grans % 1.1 %      Neutrophils, Absolute 7.16 10*3/mm3      Lymphocytes, Absolute 3.06 10*3/mm3      Monocytes, Absolute 0.56 10*3/mm3      Eosinophils, Absolute 0.37 10*3/mm3      Basophils, Absolute 0.08 10*3/mm3      Immature Grans, Absolute 0.13 10*3/mm3      nRBC 0.0 /100 WBC     CBC & Differential [[id number]]  (Abnormal) Collected: 04/30/21 1908    Specimen: Blood Updated: 04/30/21 1929    Narrative:      The following orders were created for panel order CBC & Differential.  Procedure                               Abnormality         Status                     ---------                               -----------         ------                     CBC Auto Differential[[id number]]        Abnormal            Final result                 Please view results for these tests on the individual orders.    CBC Auto Differential [[id number]]  (Abnormal) Collected: 04/30/21 1908    Specimen: Blood  Updated: 04/30/21 1929     WBC 10.61 10*3/mm3      RBC 2.92 10*6/mm3      Hemoglobin 9.1 g/dL      Hematocrit 26.5 %      MCV 90.8 fL      MCH 31.2 pg      MCHC 34.3 g/dL      RDW 13.0 %      RDW-SD 42.6 fl      MPV 9.6 fL      Platelets 304 10*3/mm3      Neutrophil % 56.8 %      Lymphocyte % 33.5 %      Monocyte % 5.9 %      Eosinophil % 2.4 %      Basophil % 0.5 %      Immature Grans % 0.9 %      Neutrophils, Absolute 6.03 10*3/mm3      Lymphocytes, Absolute 3.55 10*3/mm3      Monocytes, Absolute 0.63 10*3/mm3      Eosinophils, Absolute 0.25 10*3/mm3      Basophils, Absolute 0.05 10*3/mm3      Immature Grans, Absolute 0.10 10*3/mm3      nRBC 0.0 /100 WBC     CBC & Differential [[id number]]  (Abnormal) Collected: 04/30/21 0714    Specimen: Blood Updated: 04/30/21 0833    Narrative:      The following orders were created for panel order CBC & Differential.  Procedure                               Abnormality         Status                     ---------                               -----------         ------                     CBC Auto Differential[[id number]]        Abnormal            Final result                 Please view results for these tests on the individual orders.    CBC Auto Differential [[id number]]  (Abnormal) Collected: 04/30/21 0714    Specimen: Blood Updated: 04/30/21 0833     WBC 10.64 10*3/mm3      RBC 2.78 10*6/mm3      Hemoglobin 8.6 g/dL      Hematocrit 25.3 %      MCV 91.0 fL      MCH 30.9 pg      MCHC 34.0 g/dL      RDW 12.8 %      RDW-SD 41.7 fl      MPV 9.7 fL      Platelets 246 10*3/mm3      Neutrophil % 58.0 %      Lymphocyte % 31.5 %      Monocyte % 7.3 %      Eosinophil % 1.8 %      Basophil % 0.5 %      Immature Grans % 0.9 %      Neutrophils, Absolute 6.17 10*3/mm3      Lymphocytes, Absolute 3.35 10*3/mm3      Monocytes, Absolute 0.78 10*3/mm3      Eosinophils, Absolute 0.19 10*3/mm3      Basophils, Absolute 0.05 10*3/mm3      Immature Grans, Absolute 0.10 10*3/mm3      nRBC 0.0 /100  WBC     CBC & Differential [[id number]]  (Abnormal) Collected: 04/29/21 1909    Specimen: Blood Updated: 04/29/21 2008    Narrative:      The following orders were created for panel order CBC & Differential.  Procedure                               Abnormality         Status                     ---------                               -----------         ------                     CBC Auto Differential[[id number]]        Abnormal            Final result                 Please view results for these tests on the individual orders.    CBC Auto Differential [[id number]]  (Abnormal) Collected: 04/29/21 1909    Specimen: Blood Updated: 04/29/21 2008     WBC 10.77 10*3/mm3      RBC 2.99 10*6/mm3      Hemoglobin 9.4 g/dL      Hematocrit 27.1 %      MCV 90.6 fL      MCH 31.4 pg      MCHC 34.7 g/dL      RDW 12.8 %      RDW-SD 41.0 fl      MPV 9.7 fL      Platelets 271 10*3/mm3      Neutrophil % 51.2 %      Lymphocyte % 38.6 %      Monocyte % 7.8 %      Eosinophil % 0.9 %      Basophil % 0.5 %      Immature Grans % 1.0 %      Neutrophils, Absolute 5.51 10*3/mm3      Lymphocytes, Absolute 4.16 10*3/mm3      Monocytes, Absolute 0.84 10*3/mm3      Eosinophils, Absolute 0.10 10*3/mm3      Basophils, Absolute 0.05 10*3/mm3      Immature Grans, Absolute 0.11 10*3/mm3      nRBC 0.0 /100 WBC           Imaging Results (Last 48 Hours)     ** No results found for the last 48 hours. **        ECG/EMG Results (last 48 hours)     ** No results found for the last 48 hours. **        Operative/Procedure Notes (last 48 hours) (Notes from 05/01/21 1558 through 05/03/21 1558)    No notes of this type exist for this encounter.         Physician Progress Notes (last 48 hours) (Notes from 05/01/21 1558 through 05/03/21 1558)    No notes of this type exist for this encounter.       Consult Notes (last 48 hours) (Notes from 05/01/21 1558 through 05/03/21 1558)    No notes of this type exist for this encounter.          Discharge Summary      Link, Roddy  MD ANDRE at 21 0903          Post-Op Day 12 S/P      Subjective         Patient reports:  Pain is well controlled.  She is ambulating. Tolerating diet. Tolerating po -- normal.  She denies any active bleeding since she passed clot yesterday. She notes only scant bloody discharge. She denies any significant abdominal pain. She reports she has had an occasional episode of left calf pain when she walks only. She denies any swelling.      ROS:  Constitutional: positive for mild fatigue  Neuro: negative for headache or lightheadedness  Pulm: negative for shortness of air  : negative for active bleeding  Musculoskeletal: Positive for occasional left calf discomfort with ambulation              Objective          Vitals: Vital Signs Range for the last 24 hours  Temperature: Temp:  [98 °F (36.7 °C)-98.5 °F (36.9 °C)] 98 °F (36.7 °C)   Temp Source: Temp src: Oral   BP: BP: (104-114)/(66-78) 114/78   Pulse: Heart Rate:  [80-85] 85   Respirations: Resp:  [14-16] 16   SPO2:    O2 Amount (l/min):    O2 Devices Device (Oxygen Therapy): room air   Weight:               Physical Exam     Lungs normal respiratory effort   Abdomen Soft, no guarding/rebound   Incision  no drainage, no erythema, no swelling, well approximated   Extremities Bilateral lower extremities examined and without significant edema, cords or tenderness; specifically examined area of pain and no focal abnormalities noted      Labs:         Lab Results   Component Value Date     WBC 10.64 2021     HGB 8.6 (L) 2021     HCT 25.3 (L) 2021     MCV 91.0 2021      2021               Assessment/Plan           Postoperative vaginal bleeding following genitourinary procedure    Fibroid    Antithrombin 3 deficiency (CMS/HCC)     delivery delivered    Vagina bleeding    Postpartum hemorrhage, delayed (> 24 hrs)        Assessment:    Carey Armando is Day 12post-partum  , Low Transverse   with re-admission for  vaginal bleeding; pt had suction D&C with uterine balloon then uterine artery embolization she has received 2 U of PRBC's. Patient passed a round mass vaginally yesterday that grossly appeared to be a fibroid. Path is pending. Her hgb fell slightly today however pt clinically feels better and has no active bleeding.  Hemoglobin of 9 with hematocrit 26, it is being rechecked this morning.  Patient continues to feel well and is requesting discharge.  Because of bleeding issues, Lovenox will not be prescribed for the remainder of her postpartum course, patient did not use Lovenox and her last delivery and did fine.  Encouraged ambulation and discussed with patient and  the need to let us know if she has any calf pain or change in her medical condition or recurrence of any bleeding.  We will also try to avoid nonsteroidals, patient has some Percocet left at home and those will not be represcribed..  Again patient to call us with any questions or change in her condition    Electronically signed by Roddy Che MD at 05/01/21 5867

## 2021-05-03 NOTE — TELEPHONE ENCOUNTER
Called pt and discussed path findings. She has had brown spotting only. Denies heavy bleeding or severe pain. Will see in office tomorrow.

## 2021-05-04 ENCOUNTER — POSTPARTUM VISIT (OUTPATIENT)
Dept: OBSTETRICS AND GYNECOLOGY | Age: 36
End: 2021-05-04

## 2021-05-04 VITALS
BODY MASS INDEX: 27.75 KG/M2 | WEIGHT: 176.8 LBS | DIASTOLIC BLOOD PRESSURE: 60 MMHG | HEIGHT: 67 IN | SYSTOLIC BLOOD PRESSURE: 110 MMHG

## 2021-05-04 PROCEDURE — 0503F POSTPARTUM CARE VISIT: CPT | Performed by: OBSTETRICS & GYNECOLOGY

## 2021-05-04 RX ORDER — ACETAMINOPHEN 500 MG
500 TABLET ORAL EVERY 6 HOURS PRN
COMMUNITY
End: 2022-06-28

## 2021-05-04 NOTE — PROGRESS NOTES
"Chief complaint:postop/postpartum check    HPI  Carey Armando is a 36 y.o. female presents for postop evaluation. She has not had any heavy bleeding since she was discharged from [facility] 2 days ago. She denies fever/chills. No severe pain noted.         The following portions of the patient's history were reviewed and updated as appropriate: allergies, current medications, past family history, past medical history, past social history, past surgical history and problem list.    Review of Systems  Pertinent items are noted in HPI.    /60   Ht 170.2 cm (67\")   Wt 80.2 kg (176 lb 12.8 oz)   LMP 08/09/2020   Breastfeeding Yes   BMI 27.69 kg/m²         Physical Exam  Constitutional:       Appearance: Normal appearance.   Pulmonary:      Effort: Pulmonary effort is normal.   Abdominal:      General: There is no distension.      Palpations: Abdomen is soft.      Comments: Incision clean/dry/intact; no surrounding redness or swelling   Neurological:      General: No focal deficit present.      Mental Status: She is alert and oriented to person, place, and time.   Psychiatric:         Mood and Affect: Mood normal.         Behavior: Behavior normal.     tv u/s: uterine mass noted on u/s in [facility] is no longer noted. Endometrium with fluid and debri.. 2 uterine fibroids noted, largest 2.25 cm. Normal ovaries.        Diagnoses and all orders for this visit:    1. Postpartum hemorrhage, delayed (> 24 hrs) (Primary)      Reviewed pathology from [facility] consistent with retained products and impression of retained accessory lobe from pathologist. Discussed reasoning behind clinical impression of fibroid given the unusual appearance of mass and location. Patient notes understanding. Patient is currently doing well with minimal bleeding s/p UAE. Discussed option of re-starting lovenox. Pt declines this. Will f/u 1 week or prn.          "

## 2021-05-05 LAB
BASOPHILS # BLD AUTO: 0.07 10*3/MM3 (ref 0–0.2)
BASOPHILS NFR BLD AUTO: 0.7 % (ref 0–1.5)
EOSINOPHIL # BLD AUTO: 0.25 10*3/MM3 (ref 0–0.4)
EOSINOPHIL NFR BLD AUTO: 2.6 % (ref 0.3–6.2)
ERYTHROCYTE [DISTWIDTH] IN BLOOD BY AUTOMATED COUNT: 12.7 % (ref 12.3–15.4)
HCT VFR BLD AUTO: 31.2 % (ref 34–46.6)
HGB BLD-MCNC: 10.5 G/DL (ref 12–15.9)
IMM GRANULOCYTES # BLD AUTO: 0.13 10*3/MM3 (ref 0–0.05)
IMM GRANULOCYTES NFR BLD AUTO: 1.4 % (ref 0–0.5)
LYMPHOCYTES # BLD AUTO: 3.05 10*3/MM3 (ref 0.7–3.1)
LYMPHOCYTES NFR BLD AUTO: 31.9 % (ref 19.6–45.3)
MCH RBC QN AUTO: 31.4 PG (ref 26.6–33)
MCHC RBC AUTO-ENTMCNC: 33.7 G/DL (ref 31.5–35.7)
MCV RBC AUTO: 93.4 FL (ref 79–97)
MONOCYTES # BLD AUTO: 0.59 10*3/MM3 (ref 0.1–0.9)
MONOCYTES NFR BLD AUTO: 6.2 % (ref 5–12)
NEUTROPHILS # BLD AUTO: 5.48 10*3/MM3 (ref 1.7–7)
NEUTROPHILS NFR BLD AUTO: 57.2 % (ref 42.7–76)
NRBC BLD AUTO-RTO: 0 /100 WBC (ref 0–0.2)
PLATELET # BLD AUTO: 484 10*3/MM3 (ref 140–450)
RBC # BLD AUTO: 3.34 10*6/MM3 (ref 3.77–5.28)
WBC # BLD AUTO: 9.57 10*3/MM3 (ref 3.4–10.8)

## 2021-05-11 ENCOUNTER — POSTPARTUM VISIT (OUTPATIENT)
Dept: OBSTETRICS AND GYNECOLOGY | Age: 36
End: 2021-05-11

## 2021-05-11 VITALS
WEIGHT: 174.4 LBS | HEIGHT: 67 IN | BODY MASS INDEX: 27.37 KG/M2 | SYSTOLIC BLOOD PRESSURE: 100 MMHG | DIASTOLIC BLOOD PRESSURE: 60 MMHG

## 2021-05-11 DIAGNOSIS — Z98.890 STATUS POST EMBOLIZATION OF UTERINE ARTERY: ICD-10-CM

## 2021-05-11 PROBLEM — N93.9 VAGINA BLEEDING: Status: RESOLVED | Noted: 2021-04-28 | Resolved: 2021-05-11

## 2021-05-11 PROBLEM — N99.820 POSTOPERATIVE VAGINAL BLEEDING FOLLOWING GENITOURINARY PROCEDURE: Status: RESOLVED | Noted: 2021-04-28 | Resolved: 2021-05-11

## 2021-05-11 PROCEDURE — 0503F POSTPARTUM CARE VISIT: CPT | Performed by: OBSTETRICS & GYNECOLOGY

## 2021-05-11 NOTE — PROGRESS NOTES
"Chief complaint:hx pp hemorrhage    HPI  Carey Armando is a 36 y.o. female present for routine f/u. She denies any bleeding except some faint dark discharge/light blood comes out vaginally after she has BM. Her daughter was up more last night but has been sleeping well overall. She is breastfeeding without issues. She has some episodes of feeling blue but denies depression.        The following portions of the patient's history were reviewed and updated as appropriate: allergies, current medications, past family history, past medical history, past social history, past surgical history and problem list.    Review of Systems  Pertinent items are noted in HPI.    /60   Ht 170.2 cm (67\")   Wt 79.1 kg (174 lb 6.4 oz)   Breastfeeding Yes   BMI 27.31 kg/m²         Physical Exam  Constitutional:       Appearance: Normal appearance.   Pulmonary:      Effort: Pulmonary effort is normal.   Abdominal:      Palpations: Abdomen is soft.      Tenderness: There is no abdominal tenderness. There is no guarding.      Comments: Incision clean/dry/intact; no surrounding erythema or swelling   Musculoskeletal:      Comments: Bilateral lower extremities without cords, tenderness or edema   Neurological:      General: No focal deficit present.      Mental Status: She is alert and oriented to person, place, and time.   Psychiatric:         Mood and Affect: Mood normal.         Behavior: Behavior normal.             Diagnoses and all orders for this visit:    1. Postpartum hemorrhage, delayed (> 24 hrs) (Primary)    2. Status post embolization of uterine artery      Stable postpartum/post op so far. Plan observation and return to office 2 to 3 weeks for postpartum visit. Pt declines significant depression symptoms but agrees to call if things change. She will call with any significant bleeding. She declines re-starting lovenox for DVT prevention due to ant-thrombin III def. She notes she has remained very active walking " daily.

## 2021-06-01 ENCOUNTER — POSTPARTUM VISIT (OUTPATIENT)
Dept: OBSTETRICS AND GYNECOLOGY | Age: 36
End: 2021-06-01

## 2021-06-01 VITALS
SYSTOLIC BLOOD PRESSURE: 102 MMHG | WEIGHT: 177 LBS | BODY MASS INDEX: 27.78 KG/M2 | DIASTOLIC BLOOD PRESSURE: 64 MMHG | HEIGHT: 67 IN

## 2021-06-01 PROBLEM — Z67.91 RH NEGATIVE STATE IN ANTEPARTUM PERIOD: Status: RESOLVED | Noted: 2018-10-01 | Resolved: 2021-06-01

## 2021-06-01 PROBLEM — Z34.90 PREGNANCY: Status: RESOLVED | Noted: 2021-04-10 | Resolved: 2021-06-01

## 2021-06-01 PROBLEM — O26.899 RH NEGATIVE STATE IN ANTEPARTUM PERIOD: Status: RESOLVED | Noted: 2018-10-01 | Resolved: 2021-06-01

## 2021-06-01 PROCEDURE — 0503F POSTPARTUM CARE VISIT: CPT | Performed by: OBSTETRICS & GYNECOLOGY

## 2021-06-01 NOTE — PROGRESS NOTES
Subjective   Carey Armando is a 36 y.o. female who presents for a postpartum visit. She is 6 weeks postpartum following a low cervical transverse  section. I have fully reviewed the prenatal and intrapartum course. The delivery was at 37 gestational weeks. Outcome: repeat  section, low transverse incision. Anesthesia: spinal. Postpartum course has been complicated by postpartum hemorrhage with re-admission for D&C and uterine artery embolization. She later passed retained placental tissue. Baby's course has been uncomplicated. Baby is feeding by breast. Bleeding no bleeding. Bowel function is normal. Bladder function is normal. Patient is not sexually active. Contraception method is condoms. Postpartum depression screening: negative.    The following portions of the patient's history were reviewed and updated as appropriate: allergies, current medications, past family history, past medical history, past social history, past surgical history and problem list.    Review of Systems  Pertinent items are noted in HPI.    Objective   There were no vitals taken for this visit.   General:  alert, appears stated age, cooperative and no distress    Breasts:  def, breastfeeding   Lungs: Normal respiratory effort   Heart:  regular rate and rhythm   Abdomen: soft, non-tender; bowel sounds normal; no masses,  no organomegaly and incision healed without signs of infection, stitch fragment removed from corner with pick-ups    Vulva:  normal   Vagina: normal vagina, no discharge, exudate, lesion, or erythema   Cervix:  no lesions   Corpus: normal size, contour, position, consistency, mobility, non-tender   Adnexa:  no mass, fullness, tenderness   Rectal Exam: Not performed.     Assessment/Plan   normal postpartum exam. Pap smear not done at today's visit.    1. Contraception: condoms  2. Plan f/u u/s in 3 months to re-assess due to hx of uterine artery embolization/fibroids/retained tissue  3. Follow up in: 3 months  or as needed.

## 2021-10-01 ENCOUNTER — OFFICE VISIT (OUTPATIENT)
Dept: OBSTETRICS AND GYNECOLOGY | Age: 36
End: 2021-10-01

## 2021-10-01 VITALS
DIASTOLIC BLOOD PRESSURE: 66 MMHG | HEIGHT: 67 IN | SYSTOLIC BLOOD PRESSURE: 104 MMHG | WEIGHT: 178 LBS | BODY MASS INDEX: 27.94 KG/M2

## 2021-10-01 DIAGNOSIS — D21.9 FIBROID: Primary | ICD-10-CM

## 2021-10-01 DIAGNOSIS — Z76.89 ENCOUNTER TO ESTABLISH CARE: ICD-10-CM

## 2021-10-01 PROCEDURE — 99212 OFFICE O/P EST SF 10 MIN: CPT | Performed by: OBSTETRICS & GYNECOLOGY

## 2021-10-01 NOTE — PROGRESS NOTES
"Chief complaint:  Fibroids    HPI  Carey Armando is a 36 y.o. female presents for f/u u/s. She denies any issues and feels well. She has not had any bleeding since her initial postpartum period and uterine artery embolization. She is still breastfeeding. The kids are doing well.         The following portions of the patient's history were reviewed and updated as appropriate: allergies, current medications, past family history, past medical history, past social history, past surgical history and problem list.    Review of Systems  Pertinent items are noted in HPI.    /66   Ht 170.2 cm (67\")   Wt 80.7 kg (178 lb)   Breastfeeding Yes   BMI 27.88 kg/m²         Physical Exam  Constitutional:       Appearance: Normal appearance.   Pulmonary:      Effort: Pulmonary effort is normal.   Neurological:      General: No focal deficit present.      Mental Status: She is alert and oriented to person, place, and time.   Psychiatric:         Mood and Affect: Mood normal.         Behavior: Behavior normal.     tv u/s: normal uterus with small subserosal fibroid measuring up to 10 mm. Endometrium measuring 8 mm and normal ovaries.        Diagnoses and all orders for this visit:    1. Fibroid (Primary)    2. Encounter to establish care  -     Ambulatory Referral to Internal Medicine      Now only 1 fibroid noted and small. Pt has not had menses since delivery and uterine artery embolization. She and spouse do not desire future pregnancies at this point. Her spouse has male factor infertility and they used fertility specialist for pregnancies.     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     Plan f/u for annual or prn          "

## 2021-10-08 ENCOUNTER — TELEPHONE (OUTPATIENT)
Dept: OBSTETRICS AND GYNECOLOGY | Age: 36
End: 2021-10-08

## 2021-10-08 NOTE — TELEPHONE ENCOUNTER
Please call pt, I ordered all purpose nipple cream. This would not send electronically, likely because it it compounded. She can  printed rx. They should be able to fill it at [facility] as they compound it for our patients after delivery.

## 2021-10-08 NOTE — TELEPHONE ENCOUNTER
Pt calls 5 months post delivery, breast feeding and asking for nipple cream to be sent in. Please advise pharmacy verified

## 2021-12-21 ENCOUNTER — APPOINTMENT (OUTPATIENT)
Dept: VACCINE CLINIC | Facility: HOSPITAL | Age: 36
End: 2021-12-21

## 2021-12-22 ENCOUNTER — IMMUNIZATION (OUTPATIENT)
Dept: VACCINE CLINIC | Facility: HOSPITAL | Age: 36
End: 2021-12-22

## 2021-12-22 PROCEDURE — 91300 HC SARSCOV02 VAC 30MCG/0.3ML IM: CPT | Performed by: INTERNAL MEDICINE

## 2021-12-22 PROCEDURE — 0004A HC ADM SARSCOV2 30MCG/0.3ML BOOSTER: CPT | Performed by: INTERNAL MEDICINE

## 2022-01-07 ENCOUNTER — TELEPHONE (OUTPATIENT)
Dept: OBSTETRICS AND GYNECOLOGY | Age: 37
End: 2022-01-07

## 2022-01-07 RX ORDER — FLUCONAZOLE 100 MG/1
100 TABLET ORAL DAILY
Qty: 10 TABLET | Refills: 0 | Status: SHIPPED | OUTPATIENT
Start: 2022-01-07 | End: 2022-04-29

## 2022-04-29 ENCOUNTER — OFFICE VISIT (OUTPATIENT)
Dept: OBSTETRICS AND GYNECOLOGY | Age: 37
End: 2022-04-29

## 2022-04-29 VITALS
DIASTOLIC BLOOD PRESSURE: 84 MMHG | WEIGHT: 180 LBS | HEIGHT: 67 IN | BODY MASS INDEX: 28.25 KG/M2 | SYSTOLIC BLOOD PRESSURE: 124 MMHG

## 2022-04-29 DIAGNOSIS — Z13.0 SCREENING FOR IRON DEFICIENCY ANEMIA: Primary | ICD-10-CM

## 2022-04-29 DIAGNOSIS — Z01.419 ENCOUNTER FOR GYNECOLOGICAL EXAMINATION: ICD-10-CM

## 2022-04-29 DIAGNOSIS — Z13.29 SCREENING FOR THYROID DISORDER: ICD-10-CM

## 2022-04-29 DIAGNOSIS — Z11.51 ENCOUNTER FOR SCREENING FOR HUMAN PAPILLOMAVIRUS (HPV): ICD-10-CM

## 2022-04-29 PROCEDURE — 99395 PREV VISIT EST AGE 18-39: CPT | Performed by: OBSTETRICS & GYNECOLOGY

## 2022-04-29 RX ORDER — MULTIPLE VITAMINS W/ MINERALS TAB 9MG-400MCG
1 TAB ORAL DAILY
COMMUNITY
End: 2022-06-28

## 2022-04-29 NOTE — PROGRESS NOTES
.  Subjective     Chief Complaint   Patient presents with   • Gynecologic Exam     last pap 1/15/18 (-) HPV(-)  pt c/o irregular menses with heavy bleeding       History of Present Illness    Carey Armando is a 37 y.o.  who presents for annual exam.  Her menses returned about 6 months postpartum and were irregular about 6 to 8 weeks apart. They have progressively gotten closer and are now about Q 4 weeks the last 2 months.  She had more cramping than usual this last menses and flow was heavy for 2 days. She has had some pain with ovulation the last few months.     She is staying at home with kids for now. Her spouse is flying for [employer] now. She may go back to work next fall.     Obstetric History:  OB History        2    Para   2    Term   0       2    AB   0    Living   2       SAB   0    IAB   0    Ectopic   0    Molar   0    Multiple   0    Live Births   2               Menstrual History:     Patient's last menstrual period was 2022 (exact date).         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  Colonoscopy: not indicated, due at 43 yo  DEXA: not indicated.    Exercise: moderately active  Calcium/Vitamin D: adequate intake    The following portions of the patient's history were reviewed and updated as appropriate: allergies, current medications, past family history, past medical history, past social history, past surgical history and problem list.    Review of Systems    Review of Systems   Constitutional: Negative for fatigue.   Respiratory: Negative for shortness of breath.    Gastrointestinal: Negative for abdominal pain.   Genitourinary: positive for heavier flow and pain with  "ovulation  Neurological: Negative for headaches.   Psychiatric/Behavioral: Negative for dysphoric mood.         Objective   Physical Exam    /84   Ht 170.2 cm (67\")   Wt 81.6 kg (180 lb)   LMP 04/20/2022 (Exact Date)   Breastfeeding Yes Comment: trying to wean   BMI 28.19 kg/m²   General:   Alert, in no distress   Heart: regular rate and rhythm   Lungs: clear to auscultation bilaterally   Breast: Inspection negative; no masses, retractions, nipple discharge or axillary adenopathy in either breast   Neck: Supple, no thyromegaly   Abdomen: Soft, no tenderness or guarding   Pelvis: External genitalia: normal general appearance  Urinary system: urethral meatus normal  Vaginal: normal mucosa without prolapse or lesions  Cervix: normal appearance  Adnexa: no masses or tenderness  Uterus: normal, nontender   Extremities: Normal without edema   Neurologic: Alert and oriented   Psychiatric: Normal affect, judgment and mood     Assessment/Plan   Diagnoses and all orders for this visit:    1. Screening for iron deficiency anemia (Primary)  -     CBC & Differential    2. Encounter for gynecological examination  -     IGP, Apt HPV,rfx 16 / 18,45    3. Encounter for screening for human papillomavirus (HPV)  -     IGP, Apt HPV,rfx 16 / 18,45    4. Screening for thyroid disorder  -     TSH        All questions answered.  Breast self exam technique reviewed and patient encouraged to perform self-exam monthly.  Discussed healthy lifestyle modifications.  Recommended 30 minutes of aerobic exercise five times per week.  Advised pt to call if she does not receive results of pap and blood tests    Recommend f/u visit with u/s 4 weeks to assess recent heavier menses further and pt agrees.            "

## 2022-04-30 LAB
BASOPHILS # BLD AUTO: 0 X10E3/UL (ref 0–0.2)
BASOPHILS NFR BLD AUTO: 1 %
EOSINOPHIL # BLD AUTO: 0.2 X10E3/UL (ref 0–0.4)
EOSINOPHIL NFR BLD AUTO: 4 %
ERYTHROCYTE [DISTWIDTH] IN BLOOD BY AUTOMATED COUNT: 12.6 % (ref 11.7–15.4)
HCT VFR BLD AUTO: 42.6 % (ref 34–46.6)
HGB BLD-MCNC: 14.4 G/DL (ref 11.1–15.9)
IMM GRANULOCYTES # BLD AUTO: 0 X10E3/UL (ref 0–0.1)
IMM GRANULOCYTES NFR BLD AUTO: 0 %
LYMPHOCYTES # BLD AUTO: 1.7 X10E3/UL (ref 0.7–3.1)
LYMPHOCYTES NFR BLD AUTO: 29 %
MCH RBC QN AUTO: 29 PG (ref 26.6–33)
MCHC RBC AUTO-ENTMCNC: 33.8 G/DL (ref 31.5–35.7)
MCV RBC AUTO: 86 FL (ref 79–97)
MONOCYTES # BLD AUTO: 0.6 X10E3/UL (ref 0.1–0.9)
MONOCYTES NFR BLD AUTO: 10 %
NEUTROPHILS # BLD AUTO: 3.4 X10E3/UL (ref 1.4–7)
NEUTROPHILS NFR BLD AUTO: 56 %
PLATELET # BLD AUTO: 267 X10E3/UL (ref 150–450)
RBC # BLD AUTO: 4.97 X10E6/UL (ref 3.77–5.28)
TSH SERPL DL<=0.005 MIU/L-ACNC: 2.34 UIU/ML (ref 0.45–4.5)
WBC # BLD AUTO: 6 X10E3/UL (ref 3.4–10.8)

## 2022-05-04 LAB
CYTOLOGIST CVX/VAG CYTO: NORMAL
CYTOLOGY CVX/VAG DOC CYTO: NORMAL
CYTOLOGY CVX/VAG DOC THIN PREP: NORMAL
DX ICD CODE: NORMAL
HIV 1 & 2 AB SER-IMP: NORMAL
HPV I/H RISK 4 DNA CVX QL PROBE+SIG AMP: NEGATIVE
OTHER STN SPEC: NORMAL
STAT OF ADQ CVX/VAG CYTO-IMP: NORMAL

## 2022-06-28 ENCOUNTER — OFFICE VISIT (OUTPATIENT)
Dept: FAMILY MEDICINE CLINIC | Facility: CLINIC | Age: 37
End: 2022-06-28

## 2022-06-28 VITALS
BODY MASS INDEX: 27.72 KG/M2 | OXYGEN SATURATION: 97 % | HEART RATE: 68 BPM | RESPIRATION RATE: 18 BRPM | SYSTOLIC BLOOD PRESSURE: 126 MMHG | WEIGHT: 177 LBS | DIASTOLIC BLOOD PRESSURE: 80 MMHG

## 2022-06-28 DIAGNOSIS — Z00.00 ROUTINE HEALTH MAINTENANCE: Primary | ICD-10-CM

## 2022-06-28 PROCEDURE — 99385 PREV VISIT NEW AGE 18-39: CPT | Performed by: FAMILY MEDICINE

## 2022-06-28 NOTE — PROGRESS NOTES
Chief Complaint  Establish Care    Subjective    History of Present Illness {CC  Problem List  Visit  Diagnosis   Encounters  Notes  Medications  Labs  Result Review Imaging  Media :23}     Carey Armando presents to [facility] for Establish Care.  History of Present Illness     Here today to establish care. Has not had a primary care physician in some time. No real questions or concerns at this time, just wanted to get established.    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 She was evaluated by hematology, thought is that there is no need for current intervention.    She is caught up on preventive health maintenance at this time. Does have a family history of colon cancer in her father so will qualify for earlier colon cancer screening, likely to start at 40. Otherwise no interventions indicated at this time.    Is actively working on improving her diet and exercise regimen. She has 2 children, 1 and 3, at home so stays rather busy in that regard.    Has good family and social support. Enjoys her work. Gets regular dental visits.    Objective     Vital Signs:   /80   Pulse 68   Resp 18   Wt 80.3 kg (177 lb)   SpO2 97%   BMI 27.72 kg/m²   Physical Exam  Vitals and nursing note reviewed.   Constitutional:       General: She is not in acute distress.     Appearance: Normal appearance. She is not ill-appearing.   Cardiovascular:      Rate and Rhythm: Normal rate and regular rhythm.      Pulses: Normal pulses.      Heart sounds: Normal heart sounds. No murmur heard.  Pulmonary:      Effort: Pulmonary effort is normal. No respiratory distress.      Breath sounds: Normal breath sounds. No rales.   Neurological:      Mental Status: She is alert and oriented to person, place, and time. Mental status is at baseline.   Psychiatric:         Mood and Affect: Mood normal.         Behavior: Behavior normal.          Result  Review  Data Reviewed:{ Labs  Result Review  Imaging  Med Tab  Media :23}                   Assessment and Plan {CC Problem List  Visit Diagnosis  ROS  Review (Popup)  Health Maintenance  Quality  BestPractice  Medications  SmartSets  SnapShot Encounters  Media :23}   Diagnoses and all orders for this visit:    1. Routine health maintenance (Primary)    No interventions indicated at this time. Discussed anticipated preventive health needs over the next 5 to 10 years. Encouraged her to continue with healthy lifestyle habits.    Recommended follow-up as below. Encouraged communication via [employer] in the meantime.    Patient was given instructions and counseling regarding her condition or for health maintenance advice. Please see specific information pulled into the AVS (placed there by myself) if appropriate.    Return in about 1 year (around 6/28/2023), or if symptoms worsen or fail to improve, for Preventive Health Maintenance.      KAMILLA Smiley MD    Prevention counseling performed as below: Mindfulness for stress management.

## 2022-07-01 ENCOUNTER — PATIENT ROUNDING (BHMG ONLY) (OUTPATIENT)
Dept: FAMILY MEDICINE CLINIC | Facility: CLINIC | Age: 37
End: 2022-07-01

## 2022-07-01 NOTE — PROGRESS NOTES
A [employer] message has been sent to the patient for PATIENT ROUNDING with [facility].

## 2022-07-29 NOTE — TELEPHONE ENCOUNTER
We do not get samples of minastrin anymore because there is a generic. I do not have nay samples in my area currently- does she know what insurance will cover?  We can change it to something else or something cheaper.   Labs/Imaging Studies

## 2023-06-12 ENCOUNTER — TELEPHONE (OUTPATIENT)
Dept: OBSTETRICS AND GYNECOLOGY | Age: 38
End: 2023-06-12

## 2023-06-12 NOTE — TELEPHONE ENCOUNTER
PROVIDER: DR. LOWRY    Caller: Carey Armando    Relationship to patient: Self    Best call back number: [phone]    Chief complaint: ANNUAL    Type of visit: ANNUAL    Requested date: ANY     If rescheduling, when is the original appointment: 10-20 WITH MARKUS MCKEON     Additional notes:PT WANTED DR. LOWRY TO KNOW THAT SHE HAD TO CANCEL TODAY DUE TO HER DAUGHTER CHANCE IS SICK. WANTED TO BE ADDED TO DR. LOWRY WAIT LIST, SYSTEM WOULD NOT ALLOW ME (FOR SOME REASON) TO ADD TO DR. LOWRY WAIT LIST!!  PT WANTED ME TO SEND T.E. TO DR. LOWRY TO LET HER KNOW THIS IS WHY SHE HAD TO CANCEL. DR. LOWRY NEXT AVAIL WAS 3-11-24..

## 2023-06-12 NOTE — TELEPHONE ENCOUNTER
Called patient and rescheduled for 06/13, Pt unsure if she can make it tomorrow but still wanting to book, Notified patient to call back and speak with me (Laura) if this date or time does not work.

## 2023-06-13 ENCOUNTER — OFFICE VISIT (OUTPATIENT)
Dept: OBSTETRICS AND GYNECOLOGY | Age: 38
End: 2023-06-13
Payer: COMMERCIAL

## 2023-06-13 VITALS
SYSTOLIC BLOOD PRESSURE: 122 MMHG | WEIGHT: 182.8 LBS | HEIGHT: 67 IN | DIASTOLIC BLOOD PRESSURE: 74 MMHG | BODY MASS INDEX: 28.69 KG/M2

## 2023-06-13 DIAGNOSIS — Z01.419 ENCOUNTER FOR GYNECOLOGICAL EXAMINATION: Primary | ICD-10-CM

## 2023-06-13 DIAGNOSIS — N92.0 MENORRHAGIA WITH REGULAR CYCLE: ICD-10-CM

## 2023-06-13 NOTE — PROGRESS NOTES
.Subjective     Chief Complaint   Patient presents with    Gynecologic Exam     Annual exam, Last Pap 2022 NEG, HPV NEG, Pt stating she has stopped breast feeding 2 months ago and since periods have been slightly different        History of Present Illness    Carey Armando is a 38 y.o.  who presents for annual exam.  Her menses are regular every 28-30 days, lasting 4-7 days, flow is very heavy for about 2 days then light the remaining days since she stopped breastfeeding; cramps are moderate. She notes her menses were heavy with cramping when she was younger.     She and kids are great. She started to work part time and likes the balance.    Obstetric History:  OB History          2    Para   2    Term   0       2    AB   0    Living   2         SAB   0    IAB   0    Ectopic   0    Molar   0    Multiple   0    Live Births   2               Menstrual History:     Patient's last menstrual period was 2023 (exact date).         Current contraception:  male factor infertility  History of abnormal Pap smear: no  Received Gardasil immunization: yes  Perform regular self breast exam:  reg  Family history of uterine or ovarian cancer: no  Family History of colon cancer: yes - father at 55 yo  Family history of breast cancer: yes - MGM -dx after menopause    Mammogram: offered early baseline due to family hx. Pt declines and prefers to start at 43 yo  Colonoscopy: not indicated.  DEXA: not indicated.    Exercise: moderately active  Calcium/Vitamin D: adequate intake    The following portions of the patient's history were reviewed and updated as appropriate: allergies, current medications, past family history, past medical history, past social history, past surgical history, and problem list.    Review of Systems    Review of Systems   Constitutional: Negative for fatigue.   Respiratory: Negative for shortness of breath.    Gastrointestinal: Negative for abdominal pain.   Genitourinary: Negative  "for increased bleeding/cramping  Neurological: Negative for headaches.   Psychiatric/Behavioral: Negative for dysphoric mood.         Objective   Physical Exam    /74   Ht 170.2 cm (67\")   Wt 82.9 kg (182 lb 12.8 oz)   LMP 05/31/2023 (Exact Date)   Breastfeeding No   BMI 28.63 kg/m²   General:   Alert, in no distress   Heart: regular rate and rhythm   Lungs: clear to auscultation bilaterally   Breast: Inspection negative; no masses, retractions, nipple discharge or axillary adenopathy in either breast   Neck: Supple, no thyromegaly   Abdomen: Soft, no tenderness or guarding   Pelvis: External genitalia: normal general appearance  Urinary system: urethral meatus normal  Vaginal: normal mucosa without prolapse or lesions  Cervix: normal appearance  Adnexa: no masses or tenderness  Uterus: normal, nontender   Extremities: Normal without edema   Neurologic: Alert and oriented   Psychiatric: Normal affect, judgment and mood     Assessment & Plan   Diagnoses and all orders for this visit:    1. Encounter for gynecological examination (Primary)    2. Menorrhagia with regular cycle  -     TSH  -     CBC & Differential        All questions answered.  Breast self exam technique reviewed and patient encouraged to perform self-exam monthly.  Discussed healthy lifestyle modifications.  Recommended 30 minutes of aerobic exercise five times per week.  Pap def as up to date and pt agrees  Offered to schedule u/s as pt has hx of fibroids for further evaluation of increased bleeding. She notes flow is consistent with prior and she prefers to observe for now. Discussed treatment options if desired. She is not candidate for combined pills due to antithrombin III def but could consider progestin. Information provided.              "

## 2023-06-14 LAB
BASOPHILS # BLD AUTO: 0.05 10*3/MM3 (ref 0–0.2)
BASOPHILS NFR BLD AUTO: 0.5 % (ref 0–1.5)
EOSINOPHIL # BLD AUTO: 0.13 10*3/MM3 (ref 0–0.4)
EOSINOPHIL NFR BLD AUTO: 1.4 % (ref 0.3–6.2)
ERYTHROCYTE [DISTWIDTH] IN BLOOD BY AUTOMATED COUNT: 13 % (ref 12.3–15.4)
HCT VFR BLD AUTO: 39.9 % (ref 34–46.6)
HGB BLD-MCNC: 13.3 G/DL (ref 12–15.9)
IMM GRANULOCYTES # BLD AUTO: 0.02 10*3/MM3 (ref 0–0.05)
IMM GRANULOCYTES NFR BLD AUTO: 0.2 % (ref 0–0.5)
LYMPHOCYTES # BLD AUTO: 2.15 10*3/MM3 (ref 0.7–3.1)
LYMPHOCYTES NFR BLD AUTO: 22.5 % (ref 19.6–45.3)
MCH RBC QN AUTO: 28.6 PG (ref 26.6–33)
MCHC RBC AUTO-ENTMCNC: 33.3 G/DL (ref 31.5–35.7)
MCV RBC AUTO: 85.8 FL (ref 79–97)
MONOCYTES # BLD AUTO: 0.54 10*3/MM3 (ref 0.1–0.9)
MONOCYTES NFR BLD AUTO: 5.6 % (ref 5–12)
NEUTROPHILS # BLD AUTO: 6.67 10*3/MM3 (ref 1.7–7)
NEUTROPHILS NFR BLD AUTO: 69.8 % (ref 42.7–76)
NRBC BLD AUTO-RTO: 0 /100 WBC (ref 0–0.2)
PLATELET # BLD AUTO: 277 10*3/MM3 (ref 140–450)
RBC # BLD AUTO: 4.65 10*6/MM3 (ref 3.77–5.28)
TSH SERPL DL<=0.005 MIU/L-ACNC: 1.39 UIU/ML (ref 0.27–4.2)
WBC # BLD AUTO: 9.56 10*3/MM3 (ref 3.4–10.8)

## 2024-06-17 ENCOUNTER — OFFICE VISIT (OUTPATIENT)
Dept: OBSTETRICS AND GYNECOLOGY | Age: 39
End: 2024-06-17
Payer: COMMERCIAL

## 2024-06-17 VITALS
WEIGHT: 186 LBS | SYSTOLIC BLOOD PRESSURE: 124 MMHG | DIASTOLIC BLOOD PRESSURE: 82 MMHG | HEIGHT: 67 IN | BODY MASS INDEX: 29.19 KG/M2

## 2024-06-17 DIAGNOSIS — Z01.419 ENCOUNTER FOR GYNECOLOGICAL EXAMINATION: Primary | ICD-10-CM

## 2024-06-17 DIAGNOSIS — Z13.29 SCREENING FOR THYROID DISORDER: ICD-10-CM

## 2024-06-17 DIAGNOSIS — D21.9 FIBROID: ICD-10-CM

## 2024-06-17 DIAGNOSIS — Z12.31 ENCOUNTER FOR SCREENING MAMMOGRAM FOR MALIGNANT NEOPLASM OF BREAST: ICD-10-CM

## 2024-06-17 DIAGNOSIS — Z13.0 SCREENING FOR IRON DEFICIENCY ANEMIA: ICD-10-CM

## 2024-06-17 DIAGNOSIS — Z11.51 ENCOUNTER FOR SCREENING FOR HUMAN PAPILLOMAVIRUS (HPV): ICD-10-CM

## 2024-06-17 DIAGNOSIS — Z13.228 SCREENING FOR METABOLIC DISORDER: ICD-10-CM

## 2024-06-17 DIAGNOSIS — Z13.220 SCREENING FOR LIPID DISORDERS: ICD-10-CM

## 2024-06-17 PROCEDURE — 99395 PREV VISIT EST AGE 18-39: CPT | Performed by: OBSTETRICS & GYNECOLOGY

## 2024-06-17 NOTE — PROGRESS NOTES
Subjective     Chief Complaint   Patient presents with    Gynecologic Exam     Annual exam, Last Pap 2022 NEG, HPV NEG, Pt has no complaints today, Doing well        History of Present Illness    Carey Armando is a 39 y.o.  who presents for annual exam.  Her menses are regular every 28-30 days, lasting  4 days , with heavy flow first 2 days    They are moving [place] near [place] due to spouse's job. The kids are doing well. She may keep appt's here initially until they establish with MD's locally    Obstetric History:  OB History          2    Para   2    Term   0       2    AB   0    Living   2         SAB   0    IAB   0    Ectopic   0    Molar   0    Multiple   0    Live Births   2               Menstrual History:     Patient's last menstrual period was 2024 (approximate).         Current contraception:  male factor infertility  History of abnormal Pap smear: no  Received Gardasil immunization: yes  Perform regular self breast exam:  yes  Family history of uterine or ovarian cancer: no  Family History of colon cancer: yes - father -dx at 55 yo  Family history of breast cancer: yes - MGM after menopause --marlin quach 19.5 %    Mammogram: ordered  Colonoscopy: not indicated, she notes her father's MD recommended she start at age 42  DEXA: not indicated.    Exercise: moderately active  Calcium/Vitamin D: adequate intake    The following portions of the patient's history were reviewed and updated as appropriate: allergies, current medications, past family history, past medical history, past social history, past surgical history, and problem list.    Review of Systems    Review of Systems   Constitutional: Negative for fatigue.   Respiratory: Negative for shortness of breath.    Gastrointestinal: Negative for abdominal pain.   Genitourinary: Positive for heavy menstrual flow first 2 days; Negative for abnormal bleeding  Neurological: Negative for headaches.  "  Psychiatric/Behavioral: Negative for dysphoric mood.         Objective   Physical Exam    /82   Ht 170.2 cm (67\")   Wt 84.4 kg (186 lb)   LMP 05/22/2024 (Approximate)   BMI 29.13 kg/m²   General:   Alert, in no distress   Heart: regular rate and rhythm   Lungs: clear to auscultation bilaterally   Breast: Inspection is negative. Left breast is without masses, retractions, nipple discharge or axillary adenopathy. Right breast is without masses, retractions, nipple discharge or axillary adenopathy.     Neck: Supple, no thyromegaly   Abdomen: Soft, no tenderness or guarding   Pelvis: External genitalia: normal general appearance  Urinary system: urethral meatus normal  Vaginal: normal mucosa without prolapse or lesions  Cervix: normal appearance  Adnexa: no masses or tenderness  Uterus: normal, nontender   Extremities: Normal without edema   Neurologic: Alert and oriented   Psychiatric: Normal affect, judgment and mood     Assessment & Plan   Diagnoses and all orders for this visit:    1. Encounter for gynecological examination (Primary)  -     IGP, Apt HPV,rfx 16 / 18,45    2. Encounter for screening for human papillomavirus (HPV)  -     IGP, Apt HPV,rfx 16 / 18,45    3. Screening for iron deficiency anemia  -     CBC & Differential  -     Ferritin    4. Screening for metabolic disorder  -     Comprehensive Metabolic Panel    5. Screening for thyroid disorder  -     TSH    6. Screening for lipid disorders  -     Lipid Panel    7. Encounter for screening mammogram for malignant neoplasm of breast  -     Mammo Screening Digital Tomosynthesis Bilateral With CAD    8. Fibroid        All questions answered.  Breast self exam technique reviewed and patient encouraged to perform self-exam monthly.  Discussed healthy lifestyle modifications.  Recommended 30 minutes of aerobic exercise five times per week.  Advised pt to call if she does not receive results of pap within 2 weeks.     Discussed flow, pt feels this is " consistent with menses prior to pregnancies. Does desire f/u u/s to assure no new fibroids. Ordered for next day or 2.     Calculated tyrer cuzick and risk 19.5 %. Recommended baseline mammogram to assess density as she may be over 20 %. She is not sure she desires to proceed with breast imaging prior to age 42. Also discussed consult with Dr. Melendez to re-assess as she may be over 20 % as calculations can vary. She declines referral to high risk breast clinic at this time.

## 2024-06-18 ENCOUNTER — TELEPHONE (OUTPATIENT)
Dept: OBSTETRICS AND GYNECOLOGY | Age: 39
End: 2024-06-18

## 2024-06-18 ENCOUNTER — OFFICE VISIT (OUTPATIENT)
Dept: OBSTETRICS AND GYNECOLOGY | Age: 39
End: 2024-06-18
Payer: COMMERCIAL

## 2024-06-18 VITALS
BODY MASS INDEX: 28.94 KG/M2 | DIASTOLIC BLOOD PRESSURE: 74 MMHG | SYSTOLIC BLOOD PRESSURE: 118 MMHG | WEIGHT: 184.4 LBS | HEIGHT: 67 IN

## 2024-06-18 DIAGNOSIS — Z91.89 INCREASED RISK OF BREAST CANCER: ICD-10-CM

## 2024-06-18 DIAGNOSIS — D21.9 FIBROID: Primary | ICD-10-CM

## 2024-06-18 LAB
ALBUMIN SERPL-MCNC: 4.4 G/DL (ref 3.5–5.2)
ALBUMIN/GLOB SERPL: 1.8 G/DL
ALP SERPL-CCNC: 75 U/L (ref 39–117)
ALT SERPL-CCNC: 22 U/L (ref 1–33)
AST SERPL-CCNC: 19 U/L (ref 1–32)
BASOPHILS # BLD AUTO: 0.03 10*3/MM3 (ref 0–0.2)
BASOPHILS NFR BLD AUTO: 0.4 % (ref 0–1.5)
BILIRUB SERPL-MCNC: 0.3 MG/DL (ref 0–1.2)
BUN SERPL-MCNC: 15 MG/DL (ref 6–20)
BUN/CREAT SERPL: 18.5 (ref 7–25)
CALCIUM SERPL-MCNC: 9 MG/DL (ref 8.6–10.5)
CHLORIDE SERPL-SCNC: 103 MMOL/L (ref 98–107)
CHOLEST SERPL-MCNC: 127 MG/DL (ref 0–200)
CO2 SERPL-SCNC: 25.5 MMOL/L (ref 22–29)
CREAT SERPL-MCNC: 0.81 MG/DL (ref 0.57–1)
EGFRCR SERPLBLD CKD-EPI 2021: 96 ML/MIN/1.73
EOSINOPHIL # BLD AUTO: 0.21 10*3/MM3 (ref 0–0.4)
EOSINOPHIL NFR BLD AUTO: 2.5 % (ref 0.3–6.2)
ERYTHROCYTE [DISTWIDTH] IN BLOOD BY AUTOMATED COUNT: 13 % (ref 12.3–15.4)
FERRITIN SERPL-MCNC: 29.5 NG/ML (ref 13–150)
GLOBULIN SER CALC-MCNC: 2.5 GM/DL
GLUCOSE SERPL-MCNC: 99 MG/DL (ref 65–99)
HCT VFR BLD AUTO: 41.1 % (ref 34–46.6)
HDLC SERPL-MCNC: 39 MG/DL (ref 40–60)
HGB BLD-MCNC: 13.3 G/DL (ref 12–15.9)
IMM GRANULOCYTES # BLD AUTO: 0.04 10*3/MM3 (ref 0–0.05)
IMM GRANULOCYTES NFR BLD AUTO: 0.5 % (ref 0–0.5)
LDLC SERPL CALC-MCNC: 67 MG/DL (ref 0–100)
LYMPHOCYTES # BLD AUTO: 2.3 10*3/MM3 (ref 0.7–3.1)
LYMPHOCYTES NFR BLD AUTO: 27.3 % (ref 19.6–45.3)
MCH RBC QN AUTO: 27.8 PG (ref 26.6–33)
MCHC RBC AUTO-ENTMCNC: 32.4 G/DL (ref 31.5–35.7)
MCV RBC AUTO: 85.8 FL (ref 79–97)
MONOCYTES # BLD AUTO: 0.56 10*3/MM3 (ref 0.1–0.9)
MONOCYTES NFR BLD AUTO: 6.7 % (ref 5–12)
NEUTROPHILS # BLD AUTO: 5.28 10*3/MM3 (ref 1.7–7)
NEUTROPHILS NFR BLD AUTO: 62.6 % (ref 42.7–76)
NRBC BLD AUTO-RTO: 0 /100 WBC (ref 0–0.2)
PLATELET # BLD AUTO: 245 10*3/MM3 (ref 140–450)
POTASSIUM SERPL-SCNC: 4.4 MMOL/L (ref 3.5–5.2)
PROT SERPL-MCNC: 6.9 G/DL (ref 6–8.5)
RBC # BLD AUTO: 4.79 10*6/MM3 (ref 3.77–5.28)
SODIUM SERPL-SCNC: 138 MMOL/L (ref 136–145)
TRIGL SERPL-MCNC: 112 MG/DL (ref 0–150)
TSH SERPL DL<=0.005 MIU/L-ACNC: 1.2 UIU/ML (ref 0.27–4.2)
VLDLC SERPL CALC-MCNC: 21 MG/DL (ref 5–40)
WBC # BLD AUTO: 8.42 10*3/MM3 (ref 3.4–10.8)

## 2024-06-18 PROCEDURE — 99213 OFFICE O/P EST LOW 20 MIN: CPT | Performed by: OBSTETRICS & GYNECOLOGY

## 2024-06-18 NOTE — PROGRESS NOTES
"Chief Complaint   Patient presents with    Follow-up     GYN F/U for Fibroid Check, Pelvic US today, Pt has no further complaints today        HPI  Carey Armando is a 39 y.o. female is scheduled for u/s follow-up to check on fibroid. She had a very small fibroid following delivery/uterine artery embolization. She has considered options and wants to see high risk breast clinic. She declines treatment for menstrual flow at this time.         The following portions of the patient's history were reviewed and updated as appropriate: allergies, current medications, past family history, past medical history, past social history, past surgical history, and problem list.    Review of Systems  Pertinent items are noted in HPI.    /74   Ht 170.2 cm (67\")   Wt 83.6 kg (184 lb 6.4 oz)   LMP 05/22/2024 (Approximate)   BMI 28.88 kg/m²         Physical Exam  Constitutional:       Appearance: Normal appearance.   Pulmonary:      Effort: Pulmonary effort is normal.   Neurological:      General: No focal deficit present.      Mental Status: She is alert and oriented to person, place, and time.   Psychiatric:         Mood and Affect: Mood normal.         Behavior: Behavior normal.     Tv u/s: 2 small fibroids, largest 1.3 cm. Normal ovaries. 2 mm anechoic area in fundal region of endometrium.         Diagnoses and all orders for this visit:    1. Fibroid (Primary)    2. Increased risk of breast cancer  -     Ambulatory Referral to High Risk Breast ([facility], [facility])    2 fibroids now noted and discussed with pt. Reviewed small anechoic area in endometrium. She is due for menses any day so may be blood. Offered f/u u/s. She prefers to hold on f/u imaging for now    She desires referral to high risk clinic breast clinic after considering further. She plans to schedule mammogram asap.     Plan f/u for annual or prn.             "

## 2024-06-18 NOTE — TELEPHONE ENCOUNTER
Hub staff attempted to follow warm transfer process and was unsuccessful     Caller: Carey Armando    Relationship to patient: Self    Best call back number: [phone]    Patient is needing: PT WAS SEEN TODAY. SHE HAS A MAMMOGRAM APPT SCHEDULED TOMORROW AT [place] OFF[address]. PT WOULD LIKE TO CANCEL THAT AND SCHEDULE FOR MAMMOGRAM AT [facility] ON [address].

## 2024-06-19 LAB
CYTOLOGIST CVX/VAG CYTO: NORMAL
CYTOLOGY CVX/VAG DOC CYTO: NORMAL
CYTOLOGY CVX/VAG DOC THIN PREP: NORMAL
DX ICD CODE: NORMAL
HPV I/H RISK 4 DNA CVX QL PROBE+SIG AMP: NEGATIVE
Lab: NORMAL
OTHER STN SPEC: NORMAL
STAT OF ADQ CVX/VAG CYTO-IMP: NORMAL

## 2025-04-30 NOTE — LACTATION NOTE
Infant in NICU. Mom using HGP at bedside. Consistent pumping encouraged along with maternal hydration. Instructed in safe cleaning procedures for breat pump kit.   Lot # For Kenalog (Optional): [id number]

## (undated) DEVICE — SOL IRR H2O BTL 1000ML STRL

## (undated) DEVICE — SUT MNCRYL PLS ANTIB UD 4/0 PS2 18IN

## (undated) DEVICE — GLV SURG TRIUMPH CLASSIC PF LTX 6 STRL

## (undated) DEVICE — RADIFOCUS TORQUE DEVICE MULTI-TORQUE VISE: Brand: RADIFOCUS TORQUE DEVICE

## (undated) DEVICE — KENDALL SCD EXPRESS SLEEVES, KNEE LENGTH, MEDIUM: Brand: KENDALL SCD

## (undated) DEVICE — SYR MEDALLION LL PLUNGR/WHT 3ML

## (undated) DEVICE — RADIFOCUS GLIDEWIRE: Brand: GLIDEWIRE

## (undated) DEVICE — GW CERBRL JB PTFE .035IN 20X180CM

## (undated) DEVICE — Device

## (undated) DEVICE — SYR MEDALLION LL PLUNGR/LT BLU 3CC

## (undated) DEVICE — SYR MEDALLION LL PLUNGR/YEL 3CC

## (undated) DEVICE — SUT VIC 3/0 CTI 36IN J944H

## (undated) DEVICE — PROGREAT MICROCATHETER COAXIAL SYSTEM: Brand: PROGREAT

## (undated) DEVICE — CATH SOS OMNI 5FRX80CM

## (undated) DEVICE — SYR MEDALLION LL PLUNGR/LT BLU 1CC

## (undated) DEVICE — 3M(TM) TEGADERM(TM) TRANSPARENT FILM DRESSING FRAME STYLE 1627: Brand: 3M™ TEGADERM™

## (undated) DEVICE — BASN GW RINGMASTER

## (undated) DEVICE — ANTIBACTERIAL UNDYED BRAIDED (POLYGLACTIN 910), SYNTHETIC ABSORBABLE SUTURE: Brand: COATED VICRYL

## (undated) DEVICE — STPCK 3WY HP ROT

## (undated) DEVICE — DRSNG TELFA PAD NONADH STR 1S 3X4IN

## (undated) DEVICE — SUT VIC 0 CTX 36IN J978H

## (undated) DEVICE — STEERABLE GUIDEWIRE: Brand: FATHOM™ -16

## (undated) DEVICE — PK ANGIO 40

## (undated) DEVICE — SYRINGE KIT,PACKAGED,,150FT,MK 7(ANGIO-ARTERION, 150ML SYR KIT W/QFT,MC)(60729385): Brand: MEDRAD® MARK 7 ARTERION DISPOSABLE SYRINGE 150 ML WITH QUICK FILL TUBE

## (undated) DEVICE — SYR LL TP 10ML STRL

## (undated) DEVICE — PINNACLE INTRODUCER SHEATH: Brand: PINNACLE

## (undated) DEVICE — ST BALN POSTPARTUM BAKRI 24F 500ML 54CM

## (undated) DEVICE — CANN VAC GYN VACURETTE BERKELEY CRV 8MM 1P/U STRL

## (undated) DEVICE — SOL NACL 0.9PCT 1000ML

## (undated) DEVICE — ADHS SKIN DERMABOND TOPICAL HI VISC

## (undated) DEVICE — GLIDEWIRE GT GUIDE WIRE: Brand: GLIDEWIRE

## (undated) DEVICE — STRAP STIRUP WO/ RNG

## (undated) DEVICE — GLV SURG BIOGEL LTX PF 7 1/2

## (undated) DEVICE — GLV SURG SIGNATURE ESSENTIAL PF LTX SZ6

## (undated) DEVICE — SUT VIC 0 CT1 36IN J946H

## (undated) DEVICE — LOU D & C HYSTEROSCOPY: Brand: MEDLINE INDUSTRIES, INC.

## (undated) DEVICE — GLV SURG BIOGEL LTX PF 6 1/2

## (undated) DEVICE — SYR MEDALLION CONTRAST PLUNGR/YEL 10CC

## (undated) DEVICE — SUT VIC 2/0 CT1 36IN